# Patient Record
Sex: FEMALE | Race: WHITE | NOT HISPANIC OR LATINO | Employment: FULL TIME | URBAN - METROPOLITAN AREA
[De-identification: names, ages, dates, MRNs, and addresses within clinical notes are randomized per-mention and may not be internally consistent; named-entity substitution may affect disease eponyms.]

---

## 2017-04-19 ENCOUNTER — OFFICE VISIT (OUTPATIENT)
Dept: FAMILY MEDICINE | Facility: CLINIC | Age: 47
End: 2017-04-19
Payer: COMMERCIAL

## 2017-04-19 VITALS
HEIGHT: 66 IN | HEART RATE: 88 BPM | DIASTOLIC BLOOD PRESSURE: 80 MMHG | TEMPERATURE: 97.8 F | SYSTOLIC BLOOD PRESSURE: 120 MMHG | BODY MASS INDEX: 35.68 KG/M2 | WEIGHT: 222 LBS

## 2017-04-19 DIAGNOSIS — H69.93 DYSFUNCTION OF EUSTACHIAN TUBE, BILATERAL: Primary | ICD-10-CM

## 2017-04-19 DIAGNOSIS — H91.93 DECREASED HEARING OF BOTH EARS: ICD-10-CM

## 2017-04-19 PROCEDURE — 99213 OFFICE O/P EST LOW 20 MIN: CPT | Performed by: FAMILY MEDICINE

## 2017-04-19 RX ORDER — FLUTICASONE PROPIONATE 50 MCG
1 SPRAY, SUSPENSION (ML) NASAL 2 TIMES DAILY PRN
Qty: 16 G | Refills: 1 | Status: SHIPPED | OUTPATIENT
Start: 2017-04-19 | End: 2022-11-16

## 2017-04-19 NOTE — PATIENT INSTRUCTIONS
Common Middle Ear Problems  Your middle ear may have been injured or infected recently. Over time, certain growths or bone disease can also harm the middle ear. Left untreated, middle ear problems often lead to lifelong hearing loss. There are two types of hearing loss: conductive and sensorineural. One or both kinds can occur. Injury, infection, certain growths, or bone disease can cause your symptoms. A ruptured eardrum or a long-lasting (chronic) ear infection may be painful and decrease hearing.  Symptoms    Hearing loss in one or both ears    Fluid, often smelly, draining from the ear    Pain, pressure, or discomfort in the ear    Ringing in the ear   Conductive and sensorineural hearing loss    Sound waves may be disrupted before they reach the inner ear. If this happens, conductive hearing loss may occur. The ear canal can be blocked by wax, infection, a tumor, or a foreign object. The eardrum can be injured or infected. Abnormal bone growth, infection, or tumors in the middle ear can block sound waves.  Sound waves may not be processed correctly in the inner ear. If this happens, sensorineural hearing loss may occur.    0567-5790 [employer]. [address] [place] [address]. All rights reserved. This information is not intended as a substitute for professional medical care. Always follow your healthcare professional's instructions.

## 2017-04-19 NOTE — PROGRESS NOTES
SUBJECTIVE:                                                    Nancy Bronson is a 47 year old female who presents to clinic today for the following health issues:    Notes plugging of the left ear since onset of sinus infection symptoms.     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     Interested in pursuing hearing aid consultation.       Problem list and histories reviewed & adjusted, as indicated.  Additional history: none    Patient Active Problem List   Diagnosis     Snoring     S/P hysterectomy     Anemia     Past Surgical History:   Procedure Laterality Date     ABDOMEN SURGERY      hysterectomy     C C-SEC ONLY,PREV C-SEC  ,    2     C NONSPECIFIC PROCEDURE      tonsils     C NONSPECIFIC PROCEDURE      jaw surgery     DAVINCI HYSTERECTOMY SUPRACERVICAL  2012    benign pathProcedure: DAVINCI HYSTERECTOMY SUPRACERVICAL;  Davinci Supracervical Hysterectomy with Bilateral Salpingectomy, Cystoscopy ;  Surgeon: Selene Cardenas DO;  Location: [facility] OR     ENT SURGERY       HEAD & NECK SURGERY      jaw surgery     LAPAROSCOPIC TUBAL LIGATION         Social History   Substance Use Topics     Smoking status: Former Smoker     Smokeless tobacco: Never Used      Comment: quit in      Alcohol use Yes      Comment: social     Family History   Problem Relation Age of Onset     Respiratory Father      sleep apnea, hypertension     Family History Negative Mother      Family History Negative Brother      1     CANCER Maternal Grandmother      Hodgkins      CANCER Maternal Grandfather       lung cancer-smoker     CANCER Paternal Grandfather      leukemia     Family History Negative Daughter      Cancer - colorectal Maternal Aunt      -at age of diagnosis -early 40's     CANCER Other      Hodgkins and  faternal twin brother as well      Breast Cancer No family hx of            Reviewed and updated as needed  "this visit by clinical staff       Reviewed and updated as needed this visit by Provider         ROS:  Constitutional, HEENT, cardiovascular, pulmonary, gi and gu systems are negative, except as otherwise noted.    OBJECTIVE:                                                    /80 (BP Location: Right arm, Patient Position: Chair, Cuff Size: Adult Large)  Pulse 88  Temp 97.8  F (36.6  C) (Oral)  Ht 5' 6\" (1.676 m)  Wt 222 lb (100.7 kg)  LMP 12/02/2012  Breastfeeding? No  BMI 35.83 kg/m2  Body mass index is 35.83 kg/(m^2).  GENERAL: healthy, alert and no distress  EYES: Eyes grossly normal to inspection, conjunctivae and sclerae normal  HENT: serous effusion behind both TMs, erosion of posterior EAC on left  NECK: no adenopathy    Diagnostic Test Results:  none      ASSESSMENT/PLAN:                                                      1. Dysfunction of eustachian tube, bilateral - suggested intranasal steroid to help calm EDs.   - fluticasone (FLONASE) 50 MCG/ACT spray; Spray 1 spray into both nostrils 2 times daily as needed for rhinitis or allergies  Dispense: 16 g; Refill: 1    2. Decreased hearing of both ears  - OTOLARYNGOLOGY REFERRAL      Tere Garcia MD  [facility]    "

## 2017-04-19 NOTE — MR AVS SNAPSHOT
After Visit Summary   4/19/2017    Nancy Bronson    MRN: [id number]           Patient Information     Date Of Birth          1970        Visit Information        Provider Department      4/19/2017 2:30 PM Tere Garcia MD [facility]        Today's Diagnoses     Dysfunction of eustachian tube, bilateral    -  1    Decreased hearing of both ears          Care Instructions      Common Middle Ear Problems  Your middle ear may have been injured or infected recently. Over time, certain growths or bone disease can also harm the middle ear. Left untreated, middle ear problems often lead to lifelong hearing loss. There are two types of hearing loss: conductive and sensorineural. One or both kinds can occur. Injury, infection, certain growths, or bone disease can cause your symptoms. A ruptured eardrum or a long-lasting (chronic) ear infection may be painful and decrease hearing.  Symptoms    Hearing loss in one or both ears    Fluid, often smelly, draining from the ear    Pain, pressure, or discomfort in the ear    Ringing in the ear   Conductive and sensorineural hearing loss    Sound waves may be disrupted before they reach the inner ear. If this happens, conductive hearing loss may occur. The ear canal can be blocked by wax, infection, a tumor, or a foreign object. The eardrum can be injured or infected. Abnormal bone growth, infection, or tumors in the middle ear can block sound waves.  Sound waves may not be processed correctly in the inner ear. If this happens, sensorineural hearing loss may occur.    9331-3330 [employer]. [address] [place] [address]. All rights reserved. This information is not intended as a substitute for professional medical care. Always follow your healthcare professional's instructions.              Follow-ups after your visit        Additional Services     OTOLARYNGOLOGY REFERRAL       Audiology consultation followed by ENT  consultation    Your provider has referred you to: N: [facility] [phone]   [email or web address]    Please be aware that coverage of these services is subject to the terms and limitations of your health insurance plan.  Call member services at your health plan with any benefit or coverage questions.      Please bring the following with you to your appointment:    (1) Any X-Rays, CTs or MRIs which have been performed.  Contact the facility where they were done to arrange for  prior to your scheduled appointment.   (2) List of current medications  (3) This referral request   (4) Any documents/labs given to you for this referral                  Your next 10 appointments already scheduled     May 08, 2017  4:15 PM CDT   MA SCREENING DIGITAL BILATERAL with LVMA1   [facility]    [address] [address] [phone]           Do not use any powder, lotion or deodorant under your arms or on your breast. If you do, we will ask you to remove it before your exam.  Wear comfortable, two-piece clothing.  If you have any allergies, tell your care team.  Bring any previous mammograms from other facilities or have them mailed to the breast center.              Who to contact     If you have questions or need follow up information about today's clinic visit or your schedule please contact [facility] directly at [phone].  Normal or non-critical lab and imaging results will be communicated to you by MyChart, letter or phone within 4 business days after the clinic has received the results. If you do not hear from us within 7 days, please contact the clinic through MyChart or phone. If you have a critical or abnormal lab result, we will notify you by phone as soon as possible.  Submit refill requests through [employer] or call your pharmacy and they will  "forward the refill request to us. Please allow 3 business days for your refill to be completed.          Additional Information About Your Visit        [employer] Information     [employer] gives you secure access to your electronic health record. If you see a primary care provider, you can also send messages to your care team and make appointments. If you have questions, please call your primary care clinic.  If you do not have a primary care provider, please call [phone] and they will assist you.        Care EveryWhere ID     This is your Care EveryWhere ID. This could be used by other organizations to access your [facility] medical records  [id number]        Your Vitals Were     Pulse Temperature Height Last Period Breastfeeding? BMI (Body Mass Index)    88 97.8  F (36.6  C) (Oral) 5' 6\" (1.676 m) 12/02/2012 No 35.83 kg/m2       Blood Pressure from Last 3 Encounters:   04/19/17 120/80   08/20/15 130/89   02/16/15 110/78    Weight from Last 3 Encounters:   04/19/17 222 lb (100.7 kg)   08/20/15 219 lb (99.3 kg)   02/16/15 207 lb 1 oz (93.9 kg)              We Performed the Following     OTOLARYNGOLOGY REFERRAL          Today's Medication Changes          These changes are accurate as of: 4/19/17  3:09 PM.  If you have any questions, ask your nurse or doctor.               Start taking these medicines.        Dose/Directions    fluticasone 50 MCG/ACT spray   Commonly known as:  FLONASE   Used for:  Dysfunction of eustachian tube, bilateral   Started by:  Tere Garcia MD        Dose:  1 spray   Spray 1 spray into both nostrils 2 times daily as needed for rhinitis or allergies   Quantity:  16 g   Refills:  1         Stop taking these medicines if you haven't already. Please contact your care team if you have questions.     phentermine 37.5 MG capsule   Stopped by:  Tere Garcia MD                Where to get your medicines      These medications were sent to [facility]/pharmacy #[id number] - [place], [place] - [address]  [address]  [address], [facility] [address]     Phone:  [phone]     fluticasone 50 MCG/ACT spray                Primary Care Provider Office Phone #    [facility] [place] [phone]       No address on file        Thank you!     Thank you for choosing [facility]  for your care. Our goal is always to provide you with excellent care. Hearing back from our patients is one way we can continue to improve our services. Please take a few minutes to complete the written survey that you may receive in the mail after your visit with us. Thank you!             Your Updated Medication List - Protect others around you: Learn how to safely use, store and throw away your medicines at www.disposemymeds.org.          This list is accurate as of: 4/19/17  3:09 PM.  Always use your most recent med list.                   Brand Name Dispense Instructions for use    fluticasone 50 MCG/ACT spray    FLONASE    16 g    Spray 1 spray into both nostrils 2 times daily as needed for rhinitis or allergies

## 2017-04-19 NOTE — NURSING NOTE
"Chief Complaint   Patient presents with     Ear Problem       Initial /80 (BP Location: Right arm, Patient Position: Chair, Cuff Size: Adult Large)  Pulse 88  Temp 97.8  F (36.6  C) (Oral)  Ht 5' 6\" (1.676 m)  Wt 222 lb (100.7 kg)  LMP 12/02/2012  Breastfeeding? No  BMI 35.83 kg/m2 Estimated body mass index is 35.83 kg/(m^2) as calculated from the following:    Height as of this encounter: 5' 6\" (1.676 m).    Weight as of this encounter: 222 lb (100.7 kg).  Medication Reconciliation: complete     Roger Renner CMA        Failed hearing at 25 db missed all.   "

## 2017-05-08 ENCOUNTER — RADIANT APPOINTMENT (OUTPATIENT)
Dept: MAMMOGRAPHY | Facility: CLINIC | Age: 47
End: 2017-05-08
Payer: COMMERCIAL

## 2017-05-08 DIAGNOSIS — Z12.31 VISIT FOR SCREENING MAMMOGRAM: ICD-10-CM

## 2017-05-08 PROCEDURE — G0202 SCR MAMMO BI INCL CAD: HCPCS | Mod: TC

## 2018-06-25 ENCOUNTER — TRANSFERRED RECORDS (OUTPATIENT)
Dept: HEALTH INFORMATION MANAGEMENT | Facility: CLINIC | Age: 48
End: 2018-06-25

## 2018-06-28 ENCOUNTER — OFFICE VISIT (OUTPATIENT)
Dept: URGENT CARE | Facility: URGENT CARE | Age: 48
End: 2018-06-28
Payer: COMMERCIAL

## 2018-06-28 VITALS
SYSTOLIC BLOOD PRESSURE: 124 MMHG | TEMPERATURE: 98.3 F | BODY MASS INDEX: 35.83 KG/M2 | WEIGHT: 222 LBS | OXYGEN SATURATION: 98 % | DIASTOLIC BLOOD PRESSURE: 78 MMHG | HEART RATE: 80 BPM

## 2018-06-28 DIAGNOSIS — H61.23 BILATERAL IMPACTED CERUMEN: Primary | ICD-10-CM

## 2018-06-28 DIAGNOSIS — H66.90 ACUTE OTITIS MEDIA, UNSPECIFIED OTITIS MEDIA TYPE: ICD-10-CM

## 2018-06-28 DIAGNOSIS — H69.93 DYSFUNCTION OF EUSTACHIAN TUBE, BILATERAL: ICD-10-CM

## 2018-06-28 PROCEDURE — 99213 OFFICE O/P EST LOW 20 MIN: CPT | Performed by: FAMILY MEDICINE

## 2018-06-28 RX ORDER — AZITHROMYCIN 250 MG/1
TABLET, FILM COATED ORAL
Qty: 6 TABLET | Refills: 0 | Status: SHIPPED | OUTPATIENT
Start: 2018-06-28 | End: 2018-08-10

## 2018-06-28 NOTE — PROGRESS NOTES
SUBJECTIVE:   Nancy Bronson is a 48 year old female presenting with a chief complaint of   Chief Complaint   Patient presents with     Urgent Care     Otalgia     Started last week[facility]        She is { New/Established:289128} patient of [facility].    { Conditions:365497}    Review of Systems    Past Medical History:   Diagnosis Date     Anemia      NO ACTIVE PROBLEMS      Family History   Problem Relation Age of Onset     Respiratory Father      sleep apnea, hypertension     Family History Negative Mother      Family History Negative Brother      1     Cancer Maternal Grandmother      Hodgkins      Cancer Maternal Grandfather       lung cancer-smoker     Cancer Paternal Grandfather      leukemia     Family History Negative Daughter      Cancer - colorectal Maternal Aunt      -at age of diagnosis -early 40's     Cancer Other      Hodgkins and  faternal twin brother as well      Breast Cancer No family hx of      Current Outpatient Prescriptions   Medication Sig Dispense Refill     azithromycin (ZITHROMAX) 250 MG tablet Two tablets first day, then one tablet daily for four days. 6 tablet 0     fluticasone (FLONASE) 50 MCG/ACT spray Spray 1 spray into both nostrils 2 times daily as needed for rhinitis or allergies 16 g 1     Social History   Substance Use Topics     Smoking status: Former Smoker     Smokeless tobacco: Never Used      Comment: quit in      Alcohol use Yes      Comment: social       OBJECTIVE  /78  Pulse 80  Temp 98.3  F (36.8  C) (Oral)  Wt 222 lb (100.7 kg)  LMP 2012  SpO2 98%  BMI 35.83 kg/m2    Physical Exam    Labs:  No results found for this or any previous visit (from the past 24 hour(s)).    {XRay was/was not done (Optional):431348}    ASSESSMENT:      ICD-10-CM    1. Bilateral impacted cerumen H61.23    2. Dysfunction of Eustachian tube, bilateral H69.83    3. Acute otitis media, unspecified otitis media type  "H66.90 azithromycin (ZITHROMAX) 250 MG tablet        Medical Decision Making:    Differential Diagnosis:  {UC Differential Choices:164116}    Serious Comorbid Conditions:  {UC Serious Comorbid Conditions:362744}    PLAN:    {UC Plan Choices:220039}    Followup:    {UC Followup:837987::\"If not improving or if condition worsens, follow up with your Primary Care Provider\"}    There are no Patient Instructions on file for this visit.      "

## 2018-06-28 NOTE — MR AVS SNAPSHOT
After Visit Summary   6/28/2018    Nancy Bronson    MRN: [id number]           Patient Information     Date Of Birth          1970        Visit Information        Provider Department      6/28/2018 6:45 PM Thanh Mckinney MD [facility]        Today's Diagnoses     Bilateral impacted cerumen    -  1    Dysfunction of Eustachian tube, bilateral        Acute otitis media, unspecified otitis media type           Follow-ups after your visit        Who to contact     If you have questions or need follow up information about today's clinic visit or your schedule please contact [facility] directly at [phone].  Normal or non-critical lab and imaging results will be communicated to you by MyChart, letter or phone within 4 business days after the clinic has received the results. If you do not hear from us within 7 days, please contact the clinic through [employer] or phone. If you have a critical or abnormal lab result, we will notify you by phone as soon as possible.  Submit refill requests through [employer] or call your pharmacy and they will forward the refill request to us. Please allow 3 business days for your refill to be completed.          Additional Information About Your Visit        MyChart Information     [employer] gives you secure access to your electronic health record. If you see a primary care provider, you can also send messages to your care team and make appointments. If you have questions, please call your primary care clinic.  If you do not have a primary care provider, please call [phone] and they will assist you.        Care EveryWhere ID     This is your Care EveryWhere ID. This could be used by other organizations to access your [facility] medical records  [id number]        Your Vitals Were     Pulse Temperature Last Period Pulse Oximetry BMI (Body Mass Index)       80 98.3  F (36.8  C) (Oral) 12/02/2012 98% 35.83 kg/m2        Blood Pressure  from Last 3 Encounters:   06/28/18 124/78   04/19/17 120/80   08/20/15 130/89    Weight from Last 3 Encounters:   06/28/18 222 lb (100.7 kg)   04/19/17 222 lb (100.7 kg)   08/20/15 219 lb (99.3 kg)              Today, you had the following     No orders found for display         Today's Medication Changes          These changes are accurate as of 6/28/18  9:02 PM.  If you have any questions, ask your nurse or doctor.               Start taking these medicines.        Dose/Directions    azithromycin 250 MG tablet   Commonly known as:  ZITHROMAX   Used for:  Acute otitis media, unspecified otitis media type   Started by:  Thanh Mckinney MD        Two tablets first day, then one tablet daily for four days.   Quantity:  6 tablet   Refills:  0            Where to get your medicines      These medications were sent to [facility]/pharmacy #[id number] - [place], [place] - [address]  [facility]  [address]  [facility], [facility] [address]     Phone:  [phone]     azithromycin 250 MG tablet                Primary Care Provider Office Phone # Fax #    [facility] [phone] [address] [facility] [address]        Equal Access to Services     DENNIS PERRY AH: Hadii nasim cochran hadasho Soomaali, waaxda luqadaha, qaybta kaalmada adeegyada, leesa chow. So [facility] [phone].    ATENCIÓN: Si habla español, tiene a escobar disposición servicios gratuitos de asistencia lingüística. Llame al [phone].    We comply with applicable federal civil rights laws and [place] laws. We do not discriminate on the basis of race, color, national origin, age, disability, sex, sexual orientation, or gender identity.            Thank you!     Thank you for choosing [facility]  for your care. Our goal is always to provide you with excellent care. Hearing back from our patients is one way we can continue to improve our services. Please take a few minutes to complete the written survey  that you may receive in the mail after your visit with us. Thank you!             Your Updated Medication List - Protect others around you: Learn how to safely use, store and throw away your medicines at www.disposemymeds.org.          This list is accurate as of 6/28/18  9:02 PM.  Always use your most recent med list.                   Brand Name Dispense Instructions for use Diagnosis    azithromycin 250 MG tablet    ZITHROMAX    6 tablet    Two tablets first day, then one tablet daily for four days.    Acute otitis media, unspecified otitis media type       fluticasone 50 MCG/ACT spray    FLONASE    16 g    Spray 1 spray into both nostrils 2 times daily as needed for rhinitis or allergies    Dysfunction of Eustachian tube, bilateral

## 2018-06-29 NOTE — PROGRESS NOTES
SUBJECTIVE:  Nancy Bronson is a 48 year old female 3 2011 deeply anywho is complaining of ear pain for the last several days.  She states that she has had a long history of ear infections    [facility] and [facility] had cerumen impaction and told her to use some over-the-counter Colace.  She will try to remove it at home and is unable to  OBJECTIVE:  /78  Pulse 80  Temp 98.3  F (36.8  C) (Oral)  Wt 222 lb (100.7 kg)  LMP 12/02/2012  SpO2 98%  BMI 35.83 kg/m2  General appearance: mild distress.    Ears: abnormal: R TM cerumen impaction after the cerumen was removed and the ear is red; L TM erythematous  Nose: clear rhinorrhea  Oropharynx: mild erythema  Neck: supple and few small anterior cervical nodes  Lungs: chest clear to IPPA and clear to IPPA    ASSESSMENT:  Otitis Media    PLAN:  1) Antibiotics per Epic[facility] orders.  2) Symptomatic therapy suggested: use acetaminophen, ibuprofen prn.   3) Call or return to clinic prn if these symptoms worsen or fail to improve as anticipated.

## 2018-08-10 ENCOUNTER — OFFICE VISIT (OUTPATIENT)
Dept: URGENT CARE | Facility: URGENT CARE | Age: 48
End: 2018-08-10
Payer: COMMERCIAL

## 2018-08-10 VITALS
SYSTOLIC BLOOD PRESSURE: 120 MMHG | DIASTOLIC BLOOD PRESSURE: 80 MMHG | TEMPERATURE: 98.9 F | OXYGEN SATURATION: 97 % | HEART RATE: 91 BPM

## 2018-08-10 DIAGNOSIS — H72.92 PERFORATION OF TYMPANIC MEMBRANE, LEFT: Primary | ICD-10-CM

## 2018-08-10 PROCEDURE — 99213 OFFICE O/P EST LOW 20 MIN: CPT | Performed by: FAMILY MEDICINE

## 2018-08-10 NOTE — MR AVS SNAPSHOT
After Visit Summary   8/10/2018    Nancy Bronson    MRN: [id number]           Patient Information     Date Of Birth          1970        Visit Information        Provider Department      8/10/2018 5:40 PM Andrew Marquis MD [facility]        Today's Diagnoses     Perforation of tympanic membrane, left    -  1      Care Instructions    Avoid inserting objects or using drops in the left ear    Follow-up with your regular doctor or ENT in 4-6 weeks to re-evaluate    Ibuprofen 400mg as needed for pain          Follow-ups after your visit        Who to contact     If you have questions or need follow up information about today's clinic visit or your schedule please contact [facility] directly at [phone].  Normal or non-critical lab and imaging results will be communicated to you by [employer], letter or phone within 4 business days after the clinic has received the results. If you do not hear from us within 7 days, please contact the clinic through [employer] or phone. If you have a critical or abnormal lab result, we will notify you by phone as soon as possible.  Submit refill requests through [employer] or call your pharmacy and they will forward the refill request to us. Please allow 3 business days for your refill to be completed.          Additional Information About Your Visit        MyChart Information     [employer] gives you secure access to your electronic health record. If you see a primary care provider, you can also send messages to your care team and make appointments. If you have questions, please call your primary care clinic.  If you do not have a primary care provider, please call [phone] and they will assist you.        Care EveryWhere ID     This is your Care EveryWhere ID. This could be used by other organizations to access your [facility] medical records  [id number]        Your Vitals Were     Pulse Temperature Last Period Pulse  Oximetry          91 98.9  F (37.2  C) (Oral) 12/02/2012 97%         Blood Pressure from Last 3 Encounters:   08/10/18 120/80   06/28/18 124/78   04/19/17 120/80    Weight from Last 3 Encounters:   06/28/18 222 lb (100.7 kg)   04/19/17 222 lb (100.7 kg)   08/20/15 219 lb (99.3 kg)              Today, you had the following     No orders found for display         Today's Medication Changes          These changes are accurate as of 8/10/18  6:39 PM.  If you have any questions, ask your nurse or doctor.               Stop taking these medicines if you haven't already. Please contact your care team if you have questions.     azithromycin 250 MG tablet   Commonly known as:  ZITHROMAX                    Primary Care Provider Office Phone # Fax #    [facility] [phone] [address] [facility] [address]        Equal Access to Services     DENNIS PERRY : Hadii nasim cochran hadasho Soomaali, waaxda luqadaha, qaybta kaalmada adeegyada, leesa bolanos . So [facility] [phone].    ATENCIÓN: Si habla español, tiene a escobar disposición servicios gratuitos de asistencia lingüística. Llame al [phone].    We comply with applicable federal civil rights laws and [place] laws. We do not discriminate on the basis of race, color, national origin, age, disability, sex, sexual orientation, or gender identity.            Thank you!     Thank you for choosing [facility]  for your care. Our goal is always to provide you with excellent care. Hearing back from our patients is one way we can continue to improve our services. Please take a few minutes to complete the written survey that you may receive in the mail after your visit with us. Thank you!             Your Updated Medication List - Protect others around you: Learn how to safely use, store and throw away your medicines at www.disposemymeds.org.          This list is accurate as of 8/10/18  6:39 PM.  Always  use your most recent med list.                   Brand Name Dispense Instructions for use Diagnosis    fluticasone 50 MCG/ACT spray    FLONASE    16 g    Spray 1 spray into both nostrils 2 times daily as needed for rhinitis or allergies    Dysfunction of Eustachian tube, bilateral

## 2018-08-10 NOTE — PROGRESS NOTES
"Subjective:   Nancy Bronson is a 48 year old female who presents for   Chief Complaint   Patient presents with     Urgent Care     Otalgia     Lt Ear pain started yesterday. Hx of wax build up   Pain in the last 24 hours. Came on in the middle of the night.   Took ibuprofen   Used antipyrine-benzocaine drops (leftover from son) in the ear and it did help with symptoms.   No cold/congestion symptoms or illnesses recently.   Diminished hearing on the left side      PMHX/PSHX/MEDS/ALLERGIES/SHX/FHX reviewed in Epic.    Patient Active Problem List    Diagnosis Date Noted     Anemia 10/01/2013     Priority: Medium     S/P hysterectomy 01/22/2013     Priority: Medium     Snoring 02/24/2011     Priority: Medium       Current Outpatient Prescriptions   Medication     fluticasone (FLONASE) 50 MCG/ACT spray     No current facility-administered medications for this visit.        ROS:  As above per HPI    Objective:   /80 (BP Location: Right arm, Patient Position: Chair, Cuff Size: Adult Large)  Pulse 91  Temp 98.9  F (37.2  C) (Oral)  LMP 12/02/2012  SpO2 97%, There is no height or weight on file to calculate BMI.  Gen:  NAD, well-nourished, sitting in chair comfortably  HEENT: EOMI, PERRL sclera anicteric, Head normocephalic, ; nares patent; oropharynx pink and moist, perforation of left TM on the posterior section, no erythema or bulging of TM\"s bilaterally. Normal ear canals  Neck: trachea midline  CV: Hemodynamically stable, cap refill < 2 seconds peripherally         Results for orders placed or performed in visit on 05/08/17   *MA Screening Digital Bilateral    Narrative    Examination: Bilateral digital screening mammography with computer  aided detection, 5/8/2017 4:15 PM.    Comparison: 7/28/2014    History: No current breast concerns.    BREAST DENSITY: Scattered fibroglandular densities.    COMMENTS:  No suspicious finding.      Impression    IMPRESSION: BI-RADS CATEGORY: 1 -  NEGATIVE.    RECOMMENDED " FOLLOW-UP: Annual Mammography.      The patient will be notified of the results.     ANGEL SANTIAGO       Assessment & Plan:   Nancy DIPAK Bronson, 48 year old female who presents with:  Perforation of tympanic membrane, left  No recent cold or congestion. No trauma that caused this. No signs of infection. REcommended not using ear drops or getting ear wet. Re-evaluation in 4-6 weeks. Encouraged that because of her previous evaluations and diminished hearing that she be evaluated at ENT clinic/Audiology for her symptoms      Andrew Marquis MD   [facility]    Options for treatment and/or follow-up care were reviewed with the patient. Nancy Bronson and/or legal guardian was engaged and actively involved in the decision making process. Patient/guardian verbalized understanding of the options discussed and was satisfied with the final plan.

## 2018-08-10 NOTE — PATIENT INSTRUCTIONS
Avoid inserting objects or using drops in the left ear    Follow-up with your regular doctor or ENT in 4-6 weeks to re-evaluate    Ibuprofen 400mg as needed for pain

## 2018-09-25 DIAGNOSIS — H69.93 DYSFUNCTION OF BOTH EUSTACHIAN TUBES: Primary | ICD-10-CM

## 2018-12-05 ENCOUNTER — TRANSFERRED RECORDS (OUTPATIENT)
Dept: HEALTH INFORMATION MANAGEMENT | Facility: CLINIC | Age: 48
End: 2018-12-05

## 2019-04-01 ENCOUNTER — OFFICE VISIT (OUTPATIENT)
Dept: OBGYN | Facility: CLINIC | Age: 49
End: 2019-04-01
Payer: COMMERCIAL

## 2019-04-01 VITALS
BODY MASS INDEX: 35.27 KG/M2 | WEIGHT: 219.5 LBS | HEIGHT: 66 IN | DIASTOLIC BLOOD PRESSURE: 78 MMHG | SYSTOLIC BLOOD PRESSURE: 116 MMHG

## 2019-04-01 DIAGNOSIS — M79.671 RIGHT FOOT PAIN: ICD-10-CM

## 2019-04-01 DIAGNOSIS — M79.672 LEFT FOOT PAIN: ICD-10-CM

## 2019-04-01 DIAGNOSIS — Z86.2 HISTORY OF ANEMIA: ICD-10-CM

## 2019-04-01 DIAGNOSIS — Z00.00 ROUTINE GENERAL MEDICAL EXAMINATION AT A HEALTH CARE FACILITY: Primary | ICD-10-CM

## 2019-04-01 DIAGNOSIS — L72.3 SEBACEOUS CYST: ICD-10-CM

## 2019-04-01 DIAGNOSIS — L21.9 SEBORRHEIC DERMATITIS: ICD-10-CM

## 2019-04-01 LAB
ALBUMIN SERPL-MCNC: 3.4 G/DL (ref 3.4–5)
ALP SERPL-CCNC: 99 U/L (ref 40–150)
ALT SERPL W P-5'-P-CCNC: 19 U/L (ref 0–50)
ANION GAP SERPL CALCULATED.3IONS-SCNC: 7 MMOL/L (ref 3–14)
AST SERPL W P-5'-P-CCNC: 12 U/L (ref 0–45)
BILIRUB SERPL-MCNC: 0.4 MG/DL (ref 0.2–1.3)
BUN SERPL-MCNC: 7 MG/DL (ref 7–30)
CALCIUM SERPL-MCNC: 9 MG/DL (ref 8.5–10.1)
CHLORIDE SERPL-SCNC: 104 MMOL/L (ref 94–109)
CHOLEST SERPL-MCNC: 145 MG/DL
CO2 SERPL-SCNC: 28 MMOL/L (ref 20–32)
CREAT SERPL-MCNC: 0.88 MG/DL (ref 0.52–1.04)
ERYTHROCYTE [DISTWIDTH] IN BLOOD BY AUTOMATED COUNT: 16.4 % (ref 10–15)
FERRITIN SERPL-MCNC: 57 NG/ML (ref 8–252)
GFR SERPL CREATININE-BSD FRML MDRD: 78 ML/MIN/{1.73_M2}
GLUCOSE SERPL-MCNC: 77 MG/DL (ref 70–99)
HCT VFR BLD AUTO: 43.3 % (ref 35–47)
HDLC SERPL-MCNC: 40 MG/DL
HGB BLD-MCNC: 14.3 G/DL (ref 11.7–15.7)
IRON SATN MFR SERPL: 19 % (ref 15–46)
IRON SERPL-MCNC: 61 UG/DL (ref 35–180)
LDLC SERPL CALC-MCNC: 81 MG/DL
MCH RBC QN AUTO: 29.2 PG (ref 26.5–33)
MCHC RBC AUTO-ENTMCNC: 33 G/DL (ref 31.5–36.5)
MCV RBC AUTO: 88 FL (ref 78–100)
NONHDLC SERPL-MCNC: 105 MG/DL
PLATELET # BLD AUTO: 333 10E9/L (ref 150–450)
POTASSIUM SERPL-SCNC: 3.7 MMOL/L (ref 3.4–5.3)
PROT SERPL-MCNC: 7.4 G/DL (ref 6.8–8.8)
RBC # BLD AUTO: 4.9 10E12/L (ref 3.8–5.2)
SODIUM SERPL-SCNC: 139 MMOL/L (ref 133–144)
TIBC SERPL-MCNC: 325 UG/DL (ref 240–430)
TRIGL SERPL-MCNC: 118 MG/DL
TSH SERPL DL<=0.005 MIU/L-ACNC: 2.71 MU/L (ref 0.4–4)
WBC # BLD AUTO: 11.1 10E9/L (ref 4–11)

## 2019-04-01 PROCEDURE — 82728 ASSAY OF FERRITIN: CPT | Performed by: FAMILY MEDICINE

## 2019-04-01 PROCEDURE — 83550 IRON BINDING TEST: CPT | Performed by: FAMILY MEDICINE

## 2019-04-01 PROCEDURE — 36415 COLL VENOUS BLD VENIPUNCTURE: CPT | Performed by: FAMILY MEDICINE

## 2019-04-01 PROCEDURE — 80053 COMPREHEN METABOLIC PANEL: CPT | Performed by: FAMILY MEDICINE

## 2019-04-01 PROCEDURE — 99396 PREV VISIT EST AGE 40-64: CPT | Performed by: FAMILY MEDICINE

## 2019-04-01 PROCEDURE — 83540 ASSAY OF IRON: CPT | Performed by: FAMILY MEDICINE

## 2019-04-01 PROCEDURE — 80061 LIPID PANEL: CPT | Performed by: FAMILY MEDICINE

## 2019-04-01 PROCEDURE — 85027 COMPLETE CBC AUTOMATED: CPT | Performed by: FAMILY MEDICINE

## 2019-04-01 PROCEDURE — 84443 ASSAY THYROID STIM HORMONE: CPT | Performed by: FAMILY MEDICINE

## 2019-04-01 PROCEDURE — G0145 SCR C/V CYTO,THINLAYER,RESCR: HCPCS | Performed by: FAMILY MEDICINE

## 2019-04-01 PROCEDURE — 87624 HPV HI-RISK TYP POOLED RSLT: CPT | Performed by: FAMILY MEDICINE

## 2019-04-01 ASSESSMENT — MIFFLIN-ST. JEOR: SCORE: 1642.4

## 2019-04-01 NOTE — NURSING NOTE
"Chief Complaint   Patient presents with     Gyn Exam     pap due--pt is fasting for labs--has bump on back and wants it looked at--needs referral for sleep study--has pain in her feet       Initial /78 (BP Location: Right arm, Patient Position: Sitting, Cuff Size: Adult Large)   Ht 1.676 m (5' 6\")   Wt 99.6 kg (219 lb 8 oz)   LMP 2012   BMI 35.43 kg/m   Estimated body mass index is 35.43 kg/m  as calculated from the following:    Height as of this encounter: 1.676 m (5' 6\").    Weight as of this encounter: 99.6 kg (219 lb 8 oz).  BP completed using cuff size: large    Questioned patient about current smoking habits.  Pt. quit smoking some time ago.          The following HM Due: mammogram  pap smear        "

## 2019-04-01 NOTE — PROGRESS NOTES
SUBJECTIVE:  Nancy Bronson is an 48 year old  premenopausal woman   who presents for annual gyn exam. Hysterectomy at age 41 -- still has ovaries. No   bleeding, spotting, or discharge noted.     Contraception: Hysterectomy  KLARISSA exposure: no  History of abnormal Pap smear: No  Family history of uterine or ovarian cancer: No  Regular self breast exam: Yes  History of abnormal mammogram: No  Family history of breast cancer: No  History of abnormal lipids: No        - Pt reports a skin lesion on her mid back, would like to know what this is & what needs to be done. She has also begun experiencing bilateral foot pain [on the underside, along the arch) x 2 years ago, noticed it worsened this year [place] & is exacerbated with walking. She did purchase Dr. Vergara's inserts for her shoes about 1 year ago, states this helped mildly but not to where she is comfortable doing activities that involve a lot of walking.         Past Medical History:   Diagnosis Date     Anemia      NO ACTIVE PROBLEMS           Family History   Problem Relation Age of Onset     Respiratory Father         sleep apnea, hypertension     Family History Negative Mother      Family History Negative Brother         1     Cancer Maternal Grandmother         Hodgkins      Cancer Maternal Grandfather          lung cancer-smoker     Cancer Paternal Grandfather         leukemia     Family History Negative Daughter      Cancer - colorectal Maternal Aunt         -at age of diagnosis -early 40's     Cancer Other         Hodgkins and  faternal twin brother as well      Breast Cancer No family hx of        Past Surgical History:   Procedure Laterality Date     ABDOMEN SURGERY  2012    hysterectomy     C C-SEC ONLY,PREV C-SEC  ,    2     C NONSPECIFIC PROCEDURE  1975    tonsils     C NONSPECIFIC PROCEDURE  1985    jaw surgery     DAVINCI HYSTERECTOMY SUPRACERVICAL  2012    benign  "pathProcedure: DAVINCI HYSTERECTOMY SUPRACERVICAL;  Davinci Supracervical Hysterectomy with Bilateral Salpingectomy, Cystoscopy ;  Surgeon: Selene Cardenas DO;  Location: [facility] OR     ENT SURGERY  1975     HEAD & NECK SURGERY  1986    jaw surgery     LAPAROSCOPIC TUBAL LIGATION  2007       Current Outpatient Medications   Medication     fluticasone (FLONASE) 50 MCG/ACT spray     No current facility-administered medications for this visit.      Allergies   Allergen Reactions     No Known Drug Allergies        Social History     Tobacco Use     Smoking status: Former Smoker     Smokeless tobacco: Never Used     Tobacco comment: quit in 1995   Substance Use Topics     Alcohol use: Yes     Comment: social       Review Of Systems  Ears/Nose/Throat: negative  Respiratory: No shortness of breath, dyspnea on exertion, cough, or hemoptysis  Cardiovascular: negative  Gastrointestinal: negative  Genitourinary: negative  Constitutional, HEENT, cardiovascular, pulmonary, GI, , musculoskeletal, neuro, skin, endocrine and psych systems are negative, except as otherwise noted.        This document serves as a record of the services and decisions personally performed and made by Selene Cardenas DO. It was created on her behalf by Breanna Anne, a trained medical scribe. The creation of this document is based the provider's statements to the medical scribe.  Scribe Breanna Anne 9:14 AM, April 1, 2019    OBJECTIVE:  /78 (BP Location: Right arm, Patient Position: Sitting, Cuff Size: Adult Large)   Ht 1.676 m (5' 6\")   Wt 99.6 kg (219 lb 8 oz)   LMP 12/02/2012   BMI 35.43 kg/m    General appearance: healthy, alert and no distress  Skin: Skin color, texture, turgor normal. No rashes or lesions.  Ears: negative  Nose/Sinuses: Nares normal. Septum midline. Mucosa normal. No drainage or sinus tenderness.  Oropharynx: Lips, mucosa, and tongue normal. Teeth and gums normal.  Neck: Neck supple. No adenopathy. Thyroid symmetric, " normal size,, Carotids without bruits.  Lungs: negative, Percussion normal. Good diaphragmatic excursion. Lungs clear  Heart: negative, PMI normal. No lifts, heaves, or thrills. RRR. No murmurs, clicks gallops or rub  Breasts: Inspection negative. No nipple discharge or bleeding. No masses.  Abdomen: Abdomen soft, non-tender. BS normal. No masses, organomegaly  Pelvic: Pelvic examination with pap/without Gonorrhea and Chlamydia   including  External genitalia normal   and vagina normal rugatted mildly atrophic  Examination of urethra  normal no masses, tenderness, scarring  bladder, no masses or tenderness  Cervix no lesions or discharge  Bimanual exam with no tenderness, no descent   Adnexa/parametria normal  Uterus surgically absent            ASSESSMENT:  Nancy Bronson is an 48 year old  postmenopausal woman   who presents for annual gyn exam.     PLAN:  Dx: Annual gyn physical; Sebaceous cyst; Bilateral foot pain  1)  Labs pending; Pap smear - pathology pending; Mammogram ordered  2)  Sebaceous cyst: Dermatology referral given  3)  Bilateral foot pain: Podiatry referral given   - Advised to try plantar fascitis specific inserts in the mean time  4)  Return to clinic in 1 year for annual gyn physical; otherwise as needed.       Rx:  None      PE:  Reviewed health maintenance including diet, regular exercise,   estrogen replacement and periodic exams.          The information in this document, created by the medical scribe for me, accurately reflects the services I personally performed and the decisions made by me. I have reviewed and approved this document for accuracy prior to leaving the patient care area.  9:14 AM, 19    Dr. Selene Cardenas, DO    Obstetrics and Gynecology  [facility]

## 2019-04-01 NOTE — PATIENT INSTRUCTIONS
Plantar fasciitis inserts   [employer]     Labs today     Schedule mammogram     Dr. Selene Cardenas, DO    Obstetrics and Gynecology  [facility] [place]

## 2019-04-03 LAB
COPATH REPORT: NORMAL
PAP: NORMAL

## 2019-04-04 LAB
FINAL DIAGNOSIS: NORMAL
HPV HR 12 DNA CVX QL NAA+PROBE: NEGATIVE
HPV16 DNA SPEC QL NAA+PROBE: NEGATIVE
HPV18 DNA SPEC QL NAA+PROBE: NEGATIVE
SPECIMEN DESCRIPTION: NORMAL
SPECIMEN SOURCE CVX/VAG CYTO: NORMAL

## 2019-04-08 ENCOUNTER — ANCILLARY PROCEDURE (OUTPATIENT)
Dept: MAMMOGRAPHY | Facility: CLINIC | Age: 49
End: 2019-04-08
Attending: FAMILY MEDICINE
Payer: COMMERCIAL

## 2019-04-08 DIAGNOSIS — Z00.00 ROUTINE GENERAL MEDICAL EXAMINATION AT A HEALTH CARE FACILITY: ICD-10-CM

## 2019-04-08 PROCEDURE — 77067 SCR MAMMO BI INCL CAD: CPT

## 2019-05-31 ENCOUNTER — OFFICE VISIT (OUTPATIENT)
Dept: PODIATRY | Facility: CLINIC | Age: 49
End: 2019-05-31
Payer: COMMERCIAL

## 2019-05-31 VITALS
BODY MASS INDEX: 35.27 KG/M2 | HEIGHT: 66 IN | DIASTOLIC BLOOD PRESSURE: 72 MMHG | WEIGHT: 219.5 LBS | SYSTOLIC BLOOD PRESSURE: 118 MMHG

## 2019-05-31 DIAGNOSIS — L30.9 DERMATITIS OF BOTH FEET: Primary | ICD-10-CM

## 2019-05-31 DIAGNOSIS — M72.2 PLANTAR FASCIITIS: ICD-10-CM

## 2019-05-31 DIAGNOSIS — G57.92 NEURITIS OF LEFT HEEL: ICD-10-CM

## 2019-05-31 PROCEDURE — 99203 OFFICE O/P NEW LOW 30 MIN: CPT | Performed by: PODIATRIST

## 2019-05-31 RX ORDER — HYDROCORTISONE VALERATE CREAM 2 MG/G
CREAM TOPICAL 2 TIMES DAILY
Qty: 15 G | Refills: 2 | Status: SHIPPED | OUTPATIENT
Start: 2019-05-31 | End: 2022-11-16

## 2019-05-31 ASSESSMENT — MIFFLIN-ST. JEOR: SCORE: 1637.4

## 2019-05-31 NOTE — PATIENT INSTRUCTIONS
Thank you for choosing [facility]!    DR. BOOKER'S CLINIC LOCATIONS:   MONDAY - [place] TUESDAY - [place]   [address] [facility]  [address]   [place] [address] [place] [address]   [phone]       THURSDAY AM - [place] THURSDAY PM - [place]   [address]   [place] [address] [place] [address]   [phone]       FRIDAY AM - [place] SET UP SURGERY: [phone] [address] APPOINTMENTS: [phone]   [place] [address] BILLING QUESTIONS: [phone] FAX NUMBER: [phone]     Follow Up: 4 wks or as needed    PLANTAR FASCIITIS    Plantar fasciitis is often referred to as heel spurs or heel pain. Plantar fasciitis is a very common problem that affects people of all foot shapes, age, weight and activity level. Pain may be in the arch or on the weight-bearing surface of the heel. The pain may come on without injury or identifiable cause. Pain is generally present when first getting out of bed in the morning or up from a seated break.     CAUSES  The plantar fascia is a dense fibrous band of tissue that stretches across the bottom surface of the foot. The fascia helps support the foot muscles and arch. Plantar fasciitis is thought to be caused by mechanical strain or overload. Frequent walking without shoes or wearing unsupportive shoes is thought to cause structural overload and ultimately inflammation of the plantar fascia. Some people have heel spurs that can be seen on x-ray. The heel spur is actually a minor component of plantar fascitis and is largely ignored.       SELF TREATMENT   The easiest solution is to stop walking around your home without shoes. Plantar fasciitis is largely a shoe problem. Shoes are either not being worn often enough or your current shoes are inadequate for your weight, foot structure or activity level. The majority of shoes on the market  today are not sufficient to resist development of plantar fasciitis or to promote healing. Assume that your current shoes are inadequate and will need to be replaced. Even high quality shoes wear out with 6 months to one year of frequent use. Weight loss is another option. Losing ten pounds in the next two months may be enough to resolve the problem. Ice applied to the area of pain two to three times per day for ten minutes each session can be very helpful. This should continue until the problem resolves. Achilles tendon stretching is essential. Stretch multiple times daily to promote healing and to prevent recurrence in the future.     MEDICAL TREATMENT  Medical treatments often include custom arch supports, cortisone injections, physical therapy, splints to be worn in bed, prescription medications and surgery. The home treatments listed above will be necessary regardless of these advanced medical treatments. Surgery is rarely needed but is very helpful in selected cases.     PROGNOSIS  Plantar fasciitis can last from one day to a lifetime. Some people get intermittent fascitis that is very short-lived. Others suffer daily for years. Excessive body weight, frequent bare foot walking, long hours on the feet, inadequate shoes, predisposing foot structures and excessive activity such as running are all potential issues that lead to chronic and/or recurring plantar fascitis. Having plantar fasciitis means that you are forever prone to this problem and will require modification of some of the above factors. Most people seek treatment within one to four months. Healing usually requires a similar one to four month time frame. Healing time is relative to the amount of effort spent treating the problem.   Plantar fasciitis is highly recurrent. Risk factors often continue, including return to bare foot walking, inadequate shoes, excessive body weight, excessive activities, etc. Your life style and foot structure may  predispose you to recurrent plantar fasciitis. A daily prevention regimen can be very helpful. Ongoing use of shoe inserts, careful attention to appropriate shoes, daily Achilles stretching, etc. may prevent recurrence. Prompt attention at the earliest warning signs of heel pain can resolve the problem in as short as a few days.     EXERCISES    Stair Exercise: Step on the stairs with the ball of your foot and hold your position for at least 15 seconds, then slowly step down with the heels of your foot. You can do this daily and as often as you want.   Picking the Towel: Sit comfortably and then pick the towel up with your toes. You can use any object other than a towel as long as the material can be soft and you can pick it up with your toes.  Rolling the Bottle: Use a small ball or frozen water bottle and then roll it around with your foot.   Flex the Toes: Sit comfortably and then flex your toes by pointing it towards the floor or towards your body. This will relax and flex your foot and exercise your plantar fascia, the calf, and the Achilles tendon. The inability of the foot to stretch often causes the bunching up of the plantar fascia area leading to the pain.  Calf/Achilles Stretching: Lay on you back and raise one foot, then point your toes towards the floor. See photo below:               Hold each stretch for 10 seconds. Stretch 10 times per set, three sets per day. Morning, afternoon and evening. If your heel pain is very severe in the morning, consider doing the first set of stretches before you get out of bed.    THERAPIES DISCUSSED:  1.  Supportive Shoes: minimizing barefoot ambulation helps to provide cushion, padding and support to the ligament that is inflamed. Socks, flip flops, flats and some slippers are not typically sufficient to provide support. Shoes should be worn even indoors  2.  Insert/Orthotics: ones with an arch support built in to them provide further stress relief for the ligament.  See the information below on recommended inserts.  3. Icing: using a frozen water bottle or orange, and rolling it along the bottom of the arch/heel can help to alleviate discomfort, and can act as a tissue massage to the painful, inflamed ligament.  There is evidence that shows icing at least three times daily can be beneficial  4.  Antiinflammatory (NSAID): Ibuprofen, Aleve, as well as Tylenol can be used to help decrease symptoms and improve pain levels. If you have high blood pressure, heart disease, stomach or kidney problems, use antiinflammatories sparingly. Tylenol should not be used if you have liver problems.   5. Activity Modifications: if there are certain things that you do, whether it's going barefoot or certain shoes/activities, you should try to minimize those activities as much as possible until your symptoms are sufficiently resolved. Certainly, some activities, such as running on the treadmill, are easier to take a break from versus others, such as work or chores at home. If there are certain activities that hurt your heel, and you keep doing those activities that hurt your heel, your heel will keep hurting.  **If these initial therapies are insufficient, we have our tier 2 therapies that can more aggressively work to improve your symptoms and get you back to the activities that you enjoy!    OVER THE COUNTER INSERTS    SuperFeet   Sofsole Fit Spenco   Power Step   Walk-Fit Arch Cradles     Most of these can be found at your local [employer], sporting [employer] stores, or online.  **A good high quality over the counter insert should cost around $40-$50      [employer] [facility] LOCATIONS    [address]  [phone]   [address]  [phone] [place]  [address] [place] [address]  [phone]   [address]  [phone]   [place]  [address]  [phone] [place] [address]  [phone]   [place] [address]  [facility]  [phone]   [place] [facility]  [phone] [place]  [address] [phone]           BODY WEIGHT AND YOUR FEET  The following information is included in the after visit summary for all patients. Body weight can be a sensitive issue to discuss in clinic, but we think the following information is very important. Although we focus on the feet and ankles, we do support the overall health of our patients.     Many things can cause foot and ankle problems. Foot structure, activity level, foot mechanics and injuries are common causes of pain. One very important issue that often goes unmentioned, is body weight. Extra weight can cause increased stress on muscles, ligaments, bones and tendons. Sometimes just a few extra pounds is all it takes to put one over her/his threshold. Without reducing that stress, it can be difficult to alleviate pain. As Foot & Ankle specialists, our job is addressing the lower extremity problem and possible causes. Regarding extra body weight, we encourage patients to discuss diet and weight management plans with their primary care doctors. It is this team approach that gives you the best opportunity for pain relief and getting you back on your feet.      [facility] has a Comprehensive Weight Management Program. This program includes counseling, education, non-surgical and surgical approaches to weight loss. If you are interested in learning more either talk to you primary care provider or call [phone].

## 2019-06-12 ENCOUNTER — TRANSFERRED RECORDS (OUTPATIENT)
Dept: SURGERY | Facility: CLINIC | Age: 49
End: 2019-06-12

## 2019-06-12 ENCOUNTER — TRANSFERRED RECORDS (OUTPATIENT)
Dept: HEALTH INFORMATION MANAGEMENT | Facility: CLINIC | Age: 49
End: 2019-06-12

## 2019-06-24 ENCOUNTER — OFFICE VISIT (OUTPATIENT)
Dept: SURGERY | Facility: CLINIC | Age: 49
End: 2019-06-24
Payer: COMMERCIAL

## 2019-06-24 VITALS
OXYGEN SATURATION: 96 % | HEIGHT: 66 IN | WEIGHT: 219 LBS | SYSTOLIC BLOOD PRESSURE: 110 MMHG | BODY MASS INDEX: 35.2 KG/M2 | DIASTOLIC BLOOD PRESSURE: 76 MMHG | HEART RATE: 92 BPM | RESPIRATION RATE: 16 BRPM

## 2019-06-24 DIAGNOSIS — Z53.9 ERRONEOUS ENCOUNTER--DISREGARD: Primary | ICD-10-CM

## 2019-06-24 ASSESSMENT — MIFFLIN-ST. JEOR: SCORE: 1635.13

## 2019-06-24 NOTE — PROGRESS NOTES
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  This is more appropriately managed by the melanoma clinic at the [place].  The patient was not formally seen and examined today.  No charge visit.    Daniel Gao MD  [facility]

## 2019-06-24 NOTE — TELEPHONE ENCOUNTER
ONCOLOGY INTAKE: Records Information      APPT INFORMATION:  Referring provider: Dr. Miller  Referring provider s clinic:  [facility] in [place]  Reason for visit/diagnosis:  Melanoma  Has patient been notified of appointment date and time?: Yes    RECORDS INFORMATION:  Were the records received with the referral (via [employer])? No    Has patient been seen for any external appt for this diagnosis? Yes    If yes, where? [facility] in [place] [facility]    Has patient had any imaging or procedures outside of [facility] for this condition? Yes      If Yes, where? [facility] in [place]    ADDITIONAL INFORMATION:  Put on high priority wait list-should be seen this week.

## 2019-06-24 NOTE — LETTER
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  This is more appropriately managed by the melanoma clinic at the [facility].  The patient was not formally seen and examined today.  No charge visit.     Daniel Gao MD  [facility]

## 2019-06-27 NOTE — TELEPHONE ENCOUNTER
RECORDS STATUS - ALL OTHER DIAGNOSIS      RECORDS RECEIVED FROM: [facility]   DATE RECEIVED: Pending   NOTES STATUS DETAILS   OFFICE NOTE from referring provider     OFFICE NOTE from medical oncologist     DISCHARGE SUMMARY from hospital na    DISCHARGE REPORT from the ER na    OPERATIVE REPORT na    MEDICATION LIST na    CLINICAL TRIAL TREATMENTS TO DATE na    LABS pending    PATHOLOGY REPORTS pending    ANYTHING RELATED TO DIAGNOSIS na    GENONOMIC TESTING na    TYPE:     IMAGING (NEED IMAGES & REPORT) na    CT SCANS     MRI     MAMMO     ULTRASOUND     PET       Action Eve   Action Taken Request sent to [facility] for [facility] and MR. cdk

## 2019-07-01 PROBLEM — C43.9 MELANOMA OF SKIN (H): Status: ACTIVE | Noted: 2019-07-01

## 2019-07-01 PROBLEM — C43.59 MALIGNANT MELANOMA OF TORSO EXCLUDING BREAST (H): Status: ACTIVE | Noted: 2019-07-01

## 2019-07-01 ASSESSMENT — ENCOUNTER SYMPTOMS
HEARTBURN: 0
ABDOMINAL PAIN: 0
RECTAL PAIN: 0
POOR WOUND HEALING: 0
HYPOTENSION: 0
BLOATING: 0
LIGHT-HEADEDNESS: 0
CONSTIPATION: 0
DIARRHEA: 0
EXERCISE INTOLERANCE: 0
ORTHOPNEA: 0
PALPITATIONS: 0
NAUSEA: 0
LEG PAIN: 0
HYPERTENSION: 0
NAIL CHANGES: 0
BOWEL INCONTINENCE: 0
SYNCOPE: 0
SLEEP DISTURBANCES DUE TO BREATHING: 0
SKIN CHANGES: 1
BLOOD IN STOOL: 0
VOMITING: 0
JAUNDICE: 0

## 2019-07-02 ENCOUNTER — PRE VISIT (OUTPATIENT)
Dept: ONCOLOGY | Facility: CLINIC | Age: 49
End: 2019-07-02

## 2019-07-02 ENCOUNTER — ONCOLOGY VISIT (OUTPATIENT)
Dept: ONCOLOGY | Facility: CLINIC | Age: 49
End: 2019-07-02
Attending: SURGERY
Payer: COMMERCIAL

## 2019-07-02 VITALS
SYSTOLIC BLOOD PRESSURE: 134 MMHG | BODY MASS INDEX: 34.84 KG/M2 | RESPIRATION RATE: 16 BRPM | HEIGHT: 66 IN | OXYGEN SATURATION: 96 % | WEIGHT: 216.8 LBS | DIASTOLIC BLOOD PRESSURE: 95 MMHG | TEMPERATURE: 98.4 F | HEART RATE: 101 BPM

## 2019-07-02 DIAGNOSIS — L72.3 SEBACEOUS CYST: Primary | ICD-10-CM

## 2019-07-02 DIAGNOSIS — C43.59 MALIGNANT MELANOMA OF TORSO EXCLUDING BREAST (H): ICD-10-CM

## 2019-07-02 PROCEDURE — G0463 HOSPITAL OUTPT CLINIC VISIT: HCPCS | Mod: ZF

## 2019-07-02 RX ORDER — ACETAMINOPHEN 325 MG/1
975 TABLET ORAL ONCE
Status: CANCELLED | OUTPATIENT
Start: 2019-07-02 | End: 2019-07-02

## 2019-07-02 RX ORDER — CEFAZOLIN SODIUM 2 G/50ML
2 SOLUTION INTRAVENOUS
Status: CANCELLED | OUTPATIENT
Start: 2019-07-02

## 2019-07-02 RX ORDER — CEFAZOLIN SODIUM 1 G/50ML
1 INJECTION, SOLUTION INTRAVENOUS SEE ADMIN INSTRUCTIONS
Status: CANCELLED | OUTPATIENT
Start: 2019-07-02

## 2019-07-02 ASSESSMENT — PAIN SCALES - GENERAL: PAINLEVEL: NO PAIN (0)

## 2019-07-02 ASSESSMENT — MIFFLIN-ST. JEOR: SCORE: 1624.9

## 2019-07-02 NOTE — NURSING NOTE
"Oncology Rooming Note    July 2, 2019 12:01 PM   Nancy Bronson is a 49 year old female who presents for:    Chief Complaint   Patient presents with     Oncology Clinic Visit     [facility] NEW- MELANOMA     Initial Vitals: BP (!) 134/95 (BP Location: Right arm, Patient Position: Chair, Cuff Size: Adult Regular)   Pulse 101   Temp 98.4  F (36.9  C) (Oral)   Resp 16   Ht 1.676 m (5' 5.98\")   Wt 98.3 kg (216 lb 12.8 oz)   LMP 12/02/2012   SpO2 96%   BMI 35.01 kg/m   Estimated body mass index is 35.01 kg/m  as calculated from the following:    Height as of this encounter: 1.676 m (5' 5.98\").    Weight as of this encounter: 98.3 kg (216 lb 12.8 oz). Body surface area is 2.14 meters squared.  No Pain (0) Comment: Data Unavailable   Patient's last menstrual period was 12/02/2012.  Allergies reviewed: Yes  Medications reviewed: Yes    Medications: Medication refills not needed today.  Pharmacy name entered into [employer]:    [facility] [place], [place] - [address]  [id number] - [place], [place] - [address] [address]    Clinical concerns: No new concerns. Danisha was notified.      Lino Levine LPN            "

## 2019-07-02 NOTE — LETTER
"2019      RE: Nancy Bronson  [address] [facility] [address]     2019    Geoffrey Mauro  [facility]  [address] [place] [address]    RE: Nancy Bronson  (: 1970)    Dear Dr. Mauro     Your patient was seen for evaluation in my office.  Please find a copy of my notes for your record and review.  If you have any further questions, please feel free to contact my office.   Thank you for your kind referral.    Sincerely,   Susan Raman MD MSc [facility] FACS    ---        NEW CONSULTATION  2019    Nancy Bronson is a 49 year old female who presents with a lesion on the left upper back.  She was referred by Dr Mauro.    HPI:    She noted a raised lesion on her back in 2019 when it bled spontaneously.  She presented for evaluation in April.  It was not itchy.    A shave biopsy was performed by Marilee Colby PA-C on 2019.  It showed an at least 3.5 mm deep melanoma with ulceration and 10 mitoses per mm2.        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    FAMILY HISTORY:  Pancreatic ca: No  Melanoma: No  Other cancer: Yes father with \"skin cancer\" (dx 70, unsure which type)  Colon cancer, half aunt, maternal great-aunt  Lung cancer - maternal aunt and GF, smokers  Liver cancer - mat great GM  Leukemia - PGF    Past Medical History:   Diagnosis Date     Anemia      NO ACTIVE PROBLEMS    No MI, CVA, DM, asthma    Past Surgical History:   Procedure Laterality Date     ABDOMEN SURGERY  2012    hysterectomy     C C-SEC ONLY,PREV C-SEC  ,    2     C NONSPECIFIC PROCEDURE      " tonsils     C NONSPECIFIC PROCEDURE  1985    jaw surgery     DAVINCI HYSTERECTOMY SUPRACERVICAL  12/4/2012    benign pathProcedure: DAVINCI HYSTERECTOMY SUPRACERVICAL;  Davinci Supracervical Hysterectomy with Bilateral Salpingectomy, Cystoscopy ;  Surgeon: Selene Cardenas DO;  Location: [facility] OR     ENT SURGERY  1975     HEAD & NECK SURGERY  1986    jaw surgery     LAPAROSCOPIC TUBAL LIGATION  2007   No GA issues    Current Outpatient Medications   Medication Sig Dispense Refill     fluticasone (FLONASE) 50 MCG/ACT spray Spray 1 spray into both nostrils 2 times daily as needed for rhinitis or allergies 16 g 1     hydrocortisone (WESTCORT) 0.2 % external cream Apply topically 2 times daily 15 g 2        Allergies   Allergen Reactions     No Known Drug Allergies         SOCIAL HISTORY:  Smokes: No - quit at age 25  EtOH: Yes - occasionally  Illicit drugs: No    ROS  Headaches No  Vision changes No  Dizziness No  Abdominal pain No  Rectal bleeding Yes - with wiping, never had a colonoscopy before  Melena No   Chest pain No  Shortness of breath No  Unintentional weight loss No    LMP 12/02/2012   Physical Exam   Constitutional: She appears well-developed and well-nourished.   Lymphadenopathy:     She has no cervical adenopathy.        Right cervical: No superficial cervical, no deep cervical and no posterior cervical adenopathy present.       Left cervical: No superficial cervical, no deep cervical and no posterior cervical adenopathy present.     She has no axillary adenopathy.        Right axillary: No pectoral and no lateral adenopathy present.        Left axillary: No pectoral and no lateral adenopathy present.       Right: No supraclavicular and no epitrochlear adenopathy present.        Left: No supraclavicular and no epitrochlear adenopathy present.   Skin: Skin is warm and dry.             INVESTIGATIONS:    Biopsy from [facility] (6/12/2019) showed:  Final Diagnosis:  Skin, back, left,  upper  Melanoma  Type: Superficial spreading melanoma with prominent nodular component  Depth: 3.5mm  Ulceration: Present  Mitotic rate: 10 dermal mitoses/1 mm2   Margin status: positive lateral and deep margins     ASSESSMENT:  Nancy Bronson is a 49 year old female with truncal melanoma.    Her stage is:  Cancer Staging  Malignant melanoma of torso excluding breast (H)  Staging form: Melanoma of the Skin, AJCC 8th Edition  - Clinical: Stage IIB (cT3b, cN0, cM0) - Unsigned    The staging and natural history of melanoma was discussed with the patient and her daughter Syeda, and her mother Caitlin.      A wide local excision with appropriate margins is indicated to reduce the risk of local recurrence.  The risks of a wide local excision were discussed, including scar formation, bleeding, wound infection, wound dehiscence, seroma formation, and numbness of surrounding skin.  Given the large resultant defect and location on the back, I suspect complex wound closure may be needed. I recommended a referral to Plastic Surgery to facilitate this.    In addition to the surgical management of the skin, a sentinel lymph node biopsy is recommended for casimiro staging.  This is performed with the combination of the radioactive colloid and lymphazurin. The risks of a sentinel lymph node biopsy were discussed with the patient and family, including the risks of lymphedema, bleeding, wound infection, wound dehiscence, seroma formation, and paresthesias. There is also a small risk of anaphylaxis with lymphazurin injection as part of the procedure.  The findings of the sentinel lymph node biopsy may result in the need for further staging evaluations, surgery (i.e. Completion lymph node dissection) +/- radiation.  Mapping will be determined pre-operatively with a lymphoscintigram.  There is a 10% chance of patients whose sentinel lymph nodes do not map. Should this be the case, we discussed that we would not pursue surgical casimiro  staging, but would instead proceed with casimiro surveillance with imaging.     We discussed that surgical pathology results will be reviewed in person in clinic, at the postoperative visit. Because pathology reports are often complicated, results are not reviewed by phone.  Reviewing in person would also allow for careful discussion of next steps and for answering questions.    Finally, we discussed that patients with a diagnosis of melanoma are at risk of recurrence (local and distant) and of subsequent de sherrill melanoma.  Nancy Bronson will need to proceed with quarterly dermatologic full skin survey and evaluation.  I also reviewed the importance of protection from sun exposure, including wearing long sleeves, hats, etc and also the use of sunscreen with SPF of at least 35, with frequent re-applications.    All of the above were discussed with the patient and all questions answered.  Nancy Bronson elected to proceed with wide local excision of the left upper back melanoma and sentinel lymph node mapping and biopsy.     Total time spent with the patient was 30 minutes, of which 75% was counseling.     PLAN:  1. Wide local excision of left upper back melanoma, sentinel lymph node mapping and biospy  2. Sebaceous cyst will be included in the excision given its proximity  3. Plastic surgery referral for complex wound closure  4. Patient to report to her PCP for preoperative H&P and testing.  5. She will contact her dermatologist for a baseline full skin survey.    Susan Raman MD MSc [facility] FACS    Division of Surgical Oncology  [facility]

## 2019-07-02 NOTE — Clinical Note
"7/2/2019       RE: Nancy Bronson  [address] [facility] [address]     Dear Colleague,    Thank you for referring your patient, Nancy Bronson, to the [facility] at [facility]. Please see a copy of my visit note below.    NEW CONSULTATION  Jul 2, 2019    Nancy Bronson is a 49 year old female who presents with a lesion on the left upper back.  She was referred by Dr Mauro.    HPI:    She noted a raised lesion on her back in March 2019 when it bled spontaneously.  She presented for evaluation in April.  It was not itchy.    A shave biopsy was performed by Marilee Colby PA-C on 6/12/2019.  It showed an at least 3.5 mm deep melanoma with ulceration and 10 mitoses per mm2.        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    FAMILY HISTORY:  Pancreatic ca: No  Melanoma: No  Other cancer: Yes father with \"skin cancer\" (dx 70, unsure which type)  Colon cancer, half aunt, maternal great-aunt  Lung cancer - maternal aunt and GF, smokers  Liver cancer - mat great GM  Leukemia - PGF    Past Medical History:   Diagnosis Date     Anemia      NO ACTIVE PROBLEMS    No MI, CVA, DM, asthma    Past Surgical History:   Procedure Laterality Date     ABDOMEN SURGERY  2012    hysterectomy     C C-SEC ONLY,PREV C-SEC  2001,2004    2     C NONSPECIFIC PROCEDURE  1975    tonsils     C NONSPECIFIC PROCEDURE  1985    jaw surgery     DAVINCI HYSTERECTOMY SUPRACERVICAL  12/4/2012    benign pathProcedure: DAVINCI HYSTERECTOMY SUPRACERVICAL;  Davinci Supracervical Hysterectomy with Bilateral Salpingectomy, " Cystoscopy ;  Surgeon: Selene Cardenas DO;  Location: [facility] OR     ENT SURGERY  1975     HEAD & NECK SURGERY  1986    jaw surgery     LAPAROSCOPIC TUBAL LIGATION  2007   No GA issues    Current Outpatient Medications   Medication Sig Dispense Refill     fluticasone (FLONASE) 50 MCG/ACT spray Spray 1 spray into both nostrils 2 times daily as needed for rhinitis or allergies 16 g 1     hydrocortisone (WESTCORT) 0.2 % external cream Apply topically 2 times daily 15 g 2        Allergies   Allergen Reactions     No Known Drug Allergies         SOCIAL HISTORY:  Smokes: No - quit at age 25  EtOH: Yes - occasionally  Illicit drugs: No    ROS  Headaches No  Vision changes No  Dizziness No  Abdominal pain No  Rectal bleeding Yes - with wiping, never had a colonoscopy before  Melena No   Chest pain No  Shortness of breath No  Unintentional weight loss No    LMP 12/02/2012   Physical Exam   Constitutional: She appears well-developed and well-nourished.   Lymphadenopathy:     She has no cervical adenopathy.        Right cervical: No superficial cervical, no deep cervical and no posterior cervical adenopathy present.       Left cervical: No superficial cervical, no deep cervical and no posterior cervical adenopathy present.     She has no axillary adenopathy.        Right axillary: No pectoral and no lateral adenopathy present.        Left axillary: No pectoral and no lateral adenopathy present.       Right: No supraclavicular and no epitrochlear adenopathy present.        Left: No supraclavicular and no epitrochlear adenopathy present.   Skin: Skin is warm and dry.             INVESTIGATIONS:    Biopsy from [facility] (6/12/2019) showed:  Final Diagnosis:  Skin, back, left, upper  Melanoma  Type: Superficial spreading melanoma with prominent nodular component  Depth: 3.5mm  Ulceration: Present  Mitotic rate: 10 dermal mitoses/1 mm2   Margin status: positive lateral and deep margins     ASSESSMENT:  Nancy bacon  a 49 year old female with truncal melanoma.    Her stage is:  Cancer Staging  Malignant melanoma of torso excluding breast (H)  Staging form: Melanoma of the Skin, AJCC 8th Edition  - Clinical: Stage IIB (cT3b, cN0, cM0) - Unsigned    The staging and natural history of melanoma was discussed with the patient and her daughter Syeda, and her mother Caitlin.      A wide local excision with appropriate margins is indicated to reduce the risk of local recurrence.  The risks of a wide local excision were discussed, including scar formation, bleeding, wound infection, wound dehiscence, seroma formation, and numbness of surrounding skin.  Given the large resultant defect and location on the back, I suspect complex wound closure may be needed. I recommended a referral to Plastic Surgery to facilitate this.    In addition to the surgical management of the skin, a sentinel lymph node biopsy is recommended for casimiro staging.  This is performed with the combination of the radioactive colloid and lymphazurin. The risks of a sentinel lymph node biopsy were discussed with the patient and family, including the risks of lymphedema, bleeding, wound infection, wound dehiscence, seroma formation, and paresthesias. There is also a small risk of anaphylaxis with lymphazurin injection as part of the procedure.  The findings of the sentinel lymph node biopsy may result in the need for further staging evaluations, surgery (i.e. Completion lymph node dissection) +/- radiation.  Mapping will be determined pre-operatively with a lymphoscintigram.  There is a 10% chance of patients whose sentinel lymph nodes do not map. Should this be the case, we discussed that we would not pursue surgical casimiro staging, but would instead proceed with casimiro surveillance with imaging.     We discussed that surgical pathology results will be reviewed in person in clinic, at the postoperative visit. Because pathology reports are often complicated, results are not  reviewed by phone.  Reviewing in person would also allow for careful discussion of next steps and for answering questions.    Finally, we discussed that patients with a diagnosis of melanoma are at risk of recurrence (local and distant) and of subsequent de sherrill melanoma.  Nancy Bronson will need to proceed with quarterly dermatologic full skin survey and evaluation.  I also reviewed the importance of protection from sun exposure, including wearing long sleeves, hats, etc and also the use of sunscreen with SPF of at least 35, with frequent re-applications.    All of the above were discussed with the patient and all questions answered.  Nancy Bronson elected to proceed with wide local excision of the left upper back melanoma and sentinel lymph node mapping and biopsy.     Total time spent with the patient was 30 minutes, of which 75% was counseling.     PLAN:  1. Wide local excision of left upper back melanoma, sentinel lymph node mapping and biospy  2. Sebaceous cyst will be included in the excision given its proximity  3. Plastic surgery referral for complex wound closure  4. Patient to report to her PCP for preoperative H&P and testing.    Susan Raman MD MSc [facility] FACS    Division of Surgical Oncology  [facility]     Again, thank you for allowing me to participate in the care of your patient.      Sincerely,    Susan Raman MD

## 2019-07-02 NOTE — Clinical Note
"7/2/2019      RE: Nancy Bronson  [address] [address]       NEW CONSULTATION  Jul 2, 2019    Nancy Bronson is a 49 year old female who presents with a lesion on the left upper back.  She was referred by Dr Mauro.    HPI:    She noted a raised lesion on her back in March 2019 when it bled spontaneously.  She presented for evaluation in April.  It was not itchy.    A shave biopsy was performed by Marilee Colby PA-C on 6/12/2019.  It showed an at least 3.5 mm deep melanoma with ulceration and 10 mitoses per mm2.        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    FAMILY HISTORY:  Pancreatic ca: No  Melanoma: No  Other cancer: Yes father with \"skin cancer\" (dx 70, unsure which type)  Colon cancer, half aunt, maternal great-aunt  Lung cancer - maternal aunt and GF, smokers  Liver cancer - mat great GM  Leukemia - PGF    Past Medical History:   Diagnosis Date     Anemia      NO ACTIVE PROBLEMS    No MI, CVA, DM, asthma    Past Surgical History:   Procedure Laterality Date     ABDOMEN SURGERY  2012    hysterectomy     C C-SEC ONLY,PREV C-SEC  2001,2004    2     C NONSPECIFIC PROCEDURE  1975    tonsils     C NONSPECIFIC PROCEDURE  1985    jaw surgery     DAVINCI HYSTERECTOMY SUPRACERVICAL  12/4/2012    benign pathProcedure: DAVINCI HYSTERECTOMY SUPRACERVICAL;  Davinci Supracervical Hysterectomy with Bilateral Salpingectomy, Cystoscopy ;  Surgeon: Selene Cardenas DO;  Location: [facility] OR     ENT SURGERY  1975     HEAD & NECK SURGERY  1986    jaw surgery     LAPAROSCOPIC TUBAL LIGATION  2007   No GA issues    Current " Outpatient Medications   Medication Sig Dispense Refill     fluticasone (FLONASE) 50 MCG/ACT spray Spray 1 spray into both nostrils 2 times daily as needed for rhinitis or allergies 16 g 1     hydrocortisone (WESTCORT) 0.2 % external cream Apply topically 2 times daily 15 g 2        Allergies   Allergen Reactions     No Known Drug Allergies         SOCIAL HISTORY:  Smokes: No - quit at age 25  EtOH: Yes - occasionally  Illicit drugs: No    ROS  Headaches No  Vision changes No  Dizziness No  Abdominal pain No  Rectal bleeding Yes - with wiping, never had a colonoscopy before  Melena No   Chest pain No  Shortness of breath No  Unintentional weight loss No    LMP 12/02/2012   Physical Exam   Constitutional: She appears well-developed and well-nourished.   Lymphadenopathy:     She has no cervical adenopathy.        Right cervical: No superficial cervical, no deep cervical and no posterior cervical adenopathy present.       Left cervical: No superficial cervical, no deep cervical and no posterior cervical adenopathy present.     She has no axillary adenopathy.        Right axillary: No pectoral and no lateral adenopathy present.        Left axillary: No pectoral and no lateral adenopathy present.       Right: No supraclavicular and no epitrochlear adenopathy present.        Left: No supraclavicular and no epitrochlear adenopathy present.   Skin: Skin is warm and dry.             INVESTIGATIONS:    Biopsy from [facility] (6/12/2019) showed:  Final Diagnosis:  Skin, back, left, upper  Melanoma  Type: Superficial spreading melanoma with prominent nodular component  Depth: 3.5mm  Ulceration: Present  Mitotic rate: 10 dermal mitoses/1 mm2   Margin status: positive lateral and deep margins     ASSESSMENT:  Nancy Bronson is a 49 year old female with truncal melanoma.    Her stage is:  Cancer Staging  Malignant melanoma of torso excluding breast (H)  Staging form: Melanoma of the Skin, AJCC 8th Edition  - Clinical:  Stage IIB (cT3b, cN0, cM0) - Unsigned    The staging and natural history of melanoma was discussed with the patient and her daughter Syeda, and her mother Caitlin.      A wide local excision with appropriate margins is indicated to reduce the risk of local recurrence.  The risks of a wide local excision were discussed, including scar formation, bleeding, wound infection, wound dehiscence, seroma formation, and numbness of surrounding skin.  Given the large resultant defect and location on the back, I suspect complex wound closure may be needed. I recommended a referral to Plastic Surgery to facilitate this.    In addition to the surgical management of the skin, a sentinel lymph node biopsy is recommended for casimiro staging.  This is performed with the combination of the radioactive colloid and lymphazurin. The risks of a sentinel lymph node biopsy were discussed with the patient and family, including the risks of lymphedema, bleeding, wound infection, wound dehiscence, seroma formation, and paresthesias. There is also a small risk of anaphylaxis with lymphazurin injection as part of the procedure.  The findings of the sentinel lymph node biopsy may result in the need for further staging evaluations, surgery (i.e. Completion lymph node dissection) +/- radiation.  Mapping will be determined pre-operatively with a lymphoscintigram.  There is a 10% chance of patients whose sentinel lymph nodes do not map. Should this be the case, we discussed that we would not pursue surgical casimiro staging, but would instead proceed with casimiro surveillance with imaging.     We discussed that surgical pathology results will be reviewed in person in clinic, at the postoperative visit. Because pathology reports are often complicated, results are not reviewed by phone.  Reviewing in person would also allow for careful discussion of next steps and for answering questions.    Finally, we discussed that patients with a diagnosis of melanoma are  at risk of recurrence (local and distant) and of subsequent de sherrill melanoma.  Nancy Bronson will need to proceed with quarterly dermatologic full skin survey and evaluation.  I also reviewed the importance of protection from sun exposure, including wearing long sleeves, hats, etc and also the use of sunscreen with SPF of at least 35, with frequent re-applications.    All of the above were discussed with the patient and all questions answered.  Nancy Bronson elected to proceed with wide local excision of the left upper back melanoma and sentinel lymph node mapping and biopsy.     Total time spent with the patient was 30 minutes, of which 75% was counseling.     PLAN:  1. Wide local excision of left upper back melanoma, sentinel lymph node mapping and biospy  2. Sebaceous cyst will be included in the excision given its proximity  3. Plastic surgery referral for complex wound closure  4. Patient to report to her PCP for preoperative H&P and testing.    Susan Raman MD MSc [facility]    Division of Surgical Oncology  [facility]     Susan Raman MD

## 2019-07-02 NOTE — LETTER
"2019      RE: Nancy Bronson  [address] [facility] [address]     2019    Selene Cardenas, DO   [address]  [facility] [address]    RE: Nancy Bronson  (: 1970)    Dear Dr. Selene Cardenas    Your patient was seen for evaluation in my office.  Please find a copy of my notes for your record and review.  If you have any further questions, please feel free to contact my office.   Thank you for your kind referral.    Sincerely,   Susan Raman MD MSc [facility] FACS    ---   NEW CONSULTATION  2019    Nancy Bronson is a 49 year old female who presents with a lesion on the left upper back.  She was referred by Dr Mauro.    HPI:    She noted a raised lesion on her back in 2019 when it bled spontaneously.  She presented for evaluation in April.  It was not itchy.    A shave biopsy was performed by Marilee Colby PA-C on 2019.  It showed an at least 3.5 mm deep melanoma with ulceration and 10 mitoses per mm2.        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    FAMILY HISTORY:  Pancreatic ca: No  Melanoma: No  Other cancer: Yes father with \"skin cancer\" (dx 70, unsure which type)  Colon cancer, half aunt, maternal great-aunt  Lung cancer - maternal aunt and GF, smokers  Liver cancer - mat great GM  Leukemia - PGF    Past Medical History:   Diagnosis Date     Anemia      NO ACTIVE PROBLEMS    No MI, CVA, DM, asthma    Past Surgical History:   Procedure Laterality Date     ABDOMEN SURGERY      hysterectomy     C C-SEC ONLY,PREV C-SEC  ,    2     C NONSPECIFIC PROCEDURE  1975    tonsils "     C NONSPECIFIC PROCEDURE  1985    jaw surgery     DAVINCI HYSTERECTOMY SUPRACERVICAL  12/4/2012    benign pathProcedure: DAVINCI HYSTERECTOMY SUPRACERVICAL;  Davinci Supracervical Hysterectomy with Bilateral Salpingectomy, Cystoscopy ;  Surgeon: Selene Cardenas DO;  Location: [facility] OR     ENT SURGERY  1975     HEAD & NECK SURGERY  1986    jaw surgery     LAPAROSCOPIC TUBAL LIGATION  2007   No GA issues    Current Outpatient Medications   Medication Sig Dispense Refill     fluticasone (FLONASE) 50 MCG/ACT spray Spray 1 spray into both nostrils 2 times daily as needed for rhinitis or allergies 16 g 1     hydrocortisone (WESTCORT) 0.2 % external cream Apply topically 2 times daily 15 g 2        Allergies   Allergen Reactions     No Known Drug Allergies         SOCIAL HISTORY:  Smokes: No - quit at age 25  EtOH: Yes - occasionally  Illicit drugs: No    ROS  Headaches No  Vision changes No  Dizziness No  Abdominal pain No  Rectal bleeding Yes - with wiping, never had a colonoscopy before  Melena No   Chest pain No  Shortness of breath No  Unintentional weight loss No    LMP 12/02/2012   Physical Exam   Constitutional: She appears well-developed and well-nourished.   Lymphadenopathy:     She has no cervical adenopathy.        Right cervical: No superficial cervical, no deep cervical and no posterior cervical adenopathy present.       Left cervical: No superficial cervical, no deep cervical and no posterior cervical adenopathy present.     She has no axillary adenopathy.        Right axillary: No pectoral and no lateral adenopathy present.        Left axillary: No pectoral and no lateral adenopathy present.       Right: No supraclavicular and no epitrochlear adenopathy present.        Left: No supraclavicular and no epitrochlear adenopathy present.   Skin: Skin is warm and dry.             INVESTIGATIONS:    Biopsy from [facility] (6/12/2019) showed:  Final Diagnosis:  Skin, back, left, upper  Melanoma  Type:  Superficial spreading melanoma with prominent nodular component  Depth: 3.5mm  Ulceration: Present  Mitotic rate: 10 dermal mitoses/1 mm2   Margin status: positive lateral and deep margins     ASSESSMENT:  Nancy Bronson is a 49 year old female with truncal melanoma.    Her stage is:  Cancer Staging  Malignant melanoma of torso excluding breast (H)  Staging form: Melanoma of the Skin, AJCC 8th Edition  - Clinical: Stage IIB (cT3b, cN0, cM0) - Unsigned    The staging and natural history of melanoma was discussed with the patient and her daughter Syeda, and her mother Caitlin.      A wide local excision with appropriate margins is indicated to reduce the risk of local recurrence.  The risks of a wide local excision were discussed, including scar formation, bleeding, wound infection, wound dehiscence, seroma formation, and numbness of surrounding skin.  Given the large resultant defect and location on the back, I suspect complex wound closure may be needed. I recommended a referral to Plastic Surgery to facilitate this.    In addition to the surgical management of the skin, a sentinel lymph node biopsy is recommended for casimiro staging.  This is performed with the combination of the radioactive colloid and lymphazurin. The risks of a sentinel lymph node biopsy were discussed with the patient and family, including the risks of lymphedema, bleeding, wound infection, wound dehiscence, seroma formation, and paresthesias. There is also a small risk of anaphylaxis with lymphazurin injection as part of the procedure.  The findings of the sentinel lymph node biopsy may result in the need for further staging evaluations, surgery (i.e. Completion lymph node dissection) +/- radiation.  Mapping will be determined pre-operatively with a lymphoscintigram.  There is a 10% chance of patients whose sentinel lymph nodes do not map. Should this be the case, we discussed that we would not pursue surgical casimiro staging, but would instead  proceed with casimiro surveillance with imaging.     We discussed that surgical pathology results will be reviewed in person in clinic, at the postoperative visit. Because pathology reports are often complicated, results are not reviewed by phone.  Reviewing in person would also allow for careful discussion of next steps and for answering questions.    Finally, we discussed that patients with a diagnosis of melanoma are at risk of recurrence (local and distant) and of subsequent de sherrill melanoma.  Nancy Bronson will need to proceed with quarterly dermatologic full skin survey and evaluation.  I also reviewed the importance of protection from sun exposure, including wearing long sleeves, hats, etc and also the use of sunscreen with SPF of at least 35, with frequent re-applications.    All of the above were discussed with the patient and all questions answered.  Nancy Bronson elected to proceed with wide local excision of the left upper back melanoma and sentinel lymph node mapping and biopsy.     Total time spent with the patient was 30 minutes, of which 75% was counseling.     PLAN:  1. Wide local excision of left upper back melanoma, sentinel lymph node mapping and biospy  2. Sebaceous cyst will be included in the excision given its proximity  3. Plastic surgery referral for complex wound closure  4. Patient to report to her PCP for preoperative H&P and testing.  5. She will contact her dermatologist for a baseline full skin survey.    Susan Raman MD MSc [facility] FACS    Division of Surgical Oncology  [facility]

## 2019-07-05 ENCOUNTER — TELEPHONE (OUTPATIENT)
Dept: ONCOLOGY | Facility: CLINIC | Age: 49
End: 2019-07-05

## 2019-07-05 NOTE — TELEPHONE ENCOUNTER
Spoke/left message with: Elpidio to schedule surgery with Dr Susan Raman     [facility] was scheduled [facility] 7[facility]    Patient will have pre-surgery visit with PCP - tbd    Nuclear Medicine: yes [id number]    Implants/Special Equipment:   UNKNOWN, WILL UPDATE AFTER PT SEES PLASTICS    Wire Loc:      -Patient advised H&P is needed or surgery cancellation may occur    Post-Op care appointment scheduled?  NO on      Patient is aware a / is needed day of surgery.     Surgery packet was delivered to patient via [employer], patient has my direct contact information for any further questions.        Additional comments: Patient to go to ASC after Nuc Med, will confirm time and follow up after pt sees Dr Tate Mercado  [facility]-Op Coordinator  [phone]

## 2019-07-09 ENCOUNTER — PRE VISIT (OUTPATIENT)
Dept: SURGERY | Facility: CLINIC | Age: 49
End: 2019-07-09

## 2019-07-09 NOTE — TELEPHONE ENCOUNTER
FUTURE VISIT INFORMATION      FUTURE VISIT INFORMATION:    Date: 7/10/19    Time: 9:30am    Location: [facility]  REFERRAL INFORMATION:    Referring provider:  Susan Raman    Referring providers clinic:   [facility]    Reason for visit/diagnosis  back melanoma    RECORDS REQUESTED FROM:       Clinic name Comments Records Status Imaging Status    [facility] OV/referral Danisha 7/2/19 CHARLA

## 2019-07-10 ENCOUNTER — OFFICE VISIT (OUTPATIENT)
Dept: PLASTIC SURGERY | Facility: CLINIC | Age: 49
End: 2019-07-10
Payer: COMMERCIAL

## 2019-07-10 VITALS — BODY MASS INDEX: 34.72 KG/M2 | WEIGHT: 216 LBS | HEIGHT: 66 IN

## 2019-07-10 DIAGNOSIS — C43.59 MALIGNANT MELANOMA OF TORSO EXCLUDING BREAST (H): Primary | ICD-10-CM

## 2019-07-10 RX ORDER — CEFAZOLIN SODIUM 1 G/50ML
1 INJECTION, SOLUTION INTRAVENOUS SEE ADMIN INSTRUCTIONS
Status: CANCELLED | OUTPATIENT
Start: 2019-07-10

## 2019-07-10 RX ORDER — CEFAZOLIN SODIUM 2 G/50ML
2 SOLUTION INTRAVENOUS
Status: CANCELLED | OUTPATIENT
Start: 2019-07-10

## 2019-07-10 ASSESSMENT — MIFFLIN-ST. JEOR: SCORE: 1621.52

## 2019-07-10 ASSESSMENT — PAIN SCALES - GENERAL: PAINLEVEL: NO PAIN (0)

## 2019-07-10 NOTE — NURSING NOTE
"Chief Complaint   Patient presents with     Consult     new pt here for consult for melanoma on back, DOS 7/18 with Dr. Raman       Vitals:    07/10/19 0918   Weight: 98 kg (216 lb)   Height: 1.676 m (5' 6\")       Body mass index is 34.86 kg/m .    Ry Oquendo NREMT     Vitals not taken per provider order.    Unable to obtain history as provider wanted to see patient.    "

## 2019-07-10 NOTE — LETTER
7/10/2019       RE: Nancy Bronson  [address] [facility] [address]     Dear Colleague,    Thank you for referring your patient, Nancy Bronson, to the [facility] PLASTIC AND RECONSTRUCTIVE SURGERY at [facility]. Please see a copy of my visit note below.    REFERRING PHYSICIAN:  Susan Raman MD      PRESENTING COMPLAINT:  Consultation for mid back reconstruction after melanoma excision.      HISTORY OF PRESENTING COMPLAINT:  Ms. Bronson is 49 years old.  She has been diagnosed with malignant melanoma of her left mid to upper back region.  She is 2.5 mm thick 10 mitoses per high power field.  She has planned a 2 cm margin excision next week and I have been consulted to discuss options for reconstruction.      PAST MEDICAL HISTORY:  None.      PAST SURGICAL HISTORY:  Jaw surgery, tubal ligation, hysterectomy.      MEDICATIONS:  None.      ALLERGIES:  None.      SOCIAL HISTORY:  Used to smoke about 1/2 pack a day for 10 years, quit 25 years ago, drinks socially.  Works as a .      REVIEW OF SYSTEMS:  Denies chest pain, shortness of breath, MI, CVA, DVT and PE.      PHYSICAL EXAMINATION:  Vital signs are stable.  She is afebrile, in no obvious distress.  She is 5 feet 6 inches, 219 pounds, BMI 35 kg/m2.  On examination of her back, she has a scar about 1 x 1 cm in the mid portion of the back just to the left of the midline.      ASSESSMENT AND PLAN:  Based on above findings, a diagnosis of a malignant melanoma of the mid back region was made.  I had a discussion with the patient about closure of the area, most likely we will get primary closure.  If not, then some kind of local tissue rearrangement possibly a V-Y advancement or a Keystone type perforated base flap will be undertaken.  Last option would be a skin graft.  All of this was discussed with the patient.  She understood.  All risks, benefits, and alternatives of the procedure including pain, infection,  bleeding, scarring, asymmetry, seromas, hematomas, wound breakdown, wound dehiscence, prominent scar, hypertrophic scar, keloid scar, breakdown of the wound, requirement of further surgeries depending on pathology, injury to deeper structures, numbness, DVT, PE, MI, CVA, pneumonia, renal failure and death were all explained.  She understood them all and wants to proceed.  She is scheduled for next week. I am happy to help.  All questions were answered.  She was happy with the visit.       Total time spent with patient 30 minutes, more than half was counseling.      cc:    Susan Raman MD    [address] [place] [id number]     Again, thank you for allowing me to participate in the care of your patient.      Sincerely,    DIPAK Ybarra MD

## 2019-07-10 NOTE — PROGRESS NOTES
REFERRING PHYSICIAN:  Susan Raman MD      PRESENTING COMPLAINT:  Consultation for mid back reconstruction after melanoma excision.      HISTORY OF PRESENTING COMPLAINT:  Ms. Bronson is 49 years old.  She has been diagnosed with malignant melanoma of her left mid to upper back region.  She is 2.5 mm thick 10 mitoses per high power field.  She has planned a 2 cm margin excision next week and I have been consulted to discuss options for reconstruction.      PAST MEDICAL HISTORY:  None.      PAST SURGICAL HISTORY:  Jaw surgery, tubal ligation, hysterectomy.      MEDICATIONS:  None.      ALLERGIES:  None.      SOCIAL HISTORY:  Used to smoke about 1/2 pack a day for 10 years, quit 25 years ago, drinks socially.  Works as a .      REVIEW OF SYSTEMS:  Denies chest pain, shortness of breath, MI, CVA, DVT and PE.      PHYSICAL EXAMINATION:  Vital signs are stable.  She is afebrile, in no obvious distress.  She is 5 feet 6 inches, 219 pounds, BMI 35 kg/m2.  On examination of her back, she has a scar about 1 x 1 cm in the mid portion of the back just to the left of the midline.      ASSESSMENT AND PLAN:  Based on above findings, a diagnosis of a malignant melanoma of the mid back region was made.  I had a discussion with the patient about closure of the area, most likely we will get primary closure.  If not, then some kind of local tissue rearrangement possibly a V-Y advancement or a Keystone type perforated base flap will be undertaken.  Last option would be a skin graft.  All of this was discussed with the patient.  She understood.  All risks, benefits, and alternatives of the procedure including pain, infection, bleeding, scarring, asymmetry, seromas, hematomas, wound breakdown, wound dehiscence, prominent scar, hypertrophic scar, keloid scar, breakdown of the wound, requirement of further surgeries depending on pathology, injury to deeper structures, numbness, DVT, PE, MI, CVA, pneumonia, renal failure and death were  all explained.  She understood them all and wants to proceed.  She is scheduled for next week. I am happy to help.  All questions were answered.  She was happy with the visit.       Total time spent with patient 30 minutes, more than half was counseling.      cc:    Susan Raman MD    [address] [place] [phone]

## 2019-07-11 NOTE — TELEPHONE ENCOUNTER
Spoke/left message with: Nancy to schedule surgery with Dr Lynn Ybarra and Dr Susan Raman     Surgery was scheduled [facility] 7[facility]    Patient will have pre-surgery visit with PCP - 7/12    Nuclear Medicine: 10:00 am, directions given for parking at [facility], shuttle to [place], back to [facility] for surgery    Implants/Special Equipment: na    Wire Loc: na    -Patient advised H&P is needed or surgery cancellation may occur    Post-Op care appointment scheduled?  YES on 8/30    Patient is aware a / is needed day of surgery.     Surgery packet was delivered to patient via [employer], patient has my direct contact information for any further questions.        Additional comments:       Yessica Mercado  [facility]-Op Coordinator  [phone]

## 2019-07-12 ENCOUNTER — OFFICE VISIT (OUTPATIENT)
Dept: FAMILY MEDICINE | Facility: CLINIC | Age: 49
End: 2019-07-12
Payer: COMMERCIAL

## 2019-07-12 ENCOUNTER — PRE VISIT (OUTPATIENT)
Dept: ONCOLOGY | Facility: CLINIC | Age: 49
End: 2019-07-12

## 2019-07-12 VITALS
SYSTOLIC BLOOD PRESSURE: 120 MMHG | TEMPERATURE: 98.3 F | HEIGHT: 66 IN | OXYGEN SATURATION: 96 % | HEART RATE: 94 BPM | DIASTOLIC BLOOD PRESSURE: 70 MMHG | WEIGHT: 217.2 LBS | BODY MASS INDEX: 34.91 KG/M2 | RESPIRATION RATE: 16 BRPM

## 2019-07-12 DIAGNOSIS — Z01.818 PREOP GENERAL PHYSICAL EXAM: Primary | ICD-10-CM

## 2019-07-12 LAB
ERYTHROCYTE [DISTWIDTH] IN BLOOD BY AUTOMATED COUNT: 16.3 % (ref 10–15)
HCT VFR BLD AUTO: 41.7 % (ref 35–47)
HGB BLD-MCNC: 13.7 G/DL (ref 11.7–15.7)
MCH RBC QN AUTO: 28.9 PG (ref 26.5–33)
MCHC RBC AUTO-ENTMCNC: 32.9 G/DL (ref 31.5–36.5)
MCV RBC AUTO: 88 FL (ref 78–100)
PLATELET # BLD AUTO: 284 10E9/L (ref 150–450)
RBC # BLD AUTO: 4.74 10E12/L (ref 3.8–5.2)
WBC # BLD AUTO: 12.3 10E9/L (ref 4–11)

## 2019-07-12 PROCEDURE — 36415 COLL VENOUS BLD VENIPUNCTURE: CPT | Performed by: PHYSICIAN ASSISTANT

## 2019-07-12 PROCEDURE — 85027 COMPLETE CBC AUTOMATED: CPT | Performed by: PHYSICIAN ASSISTANT

## 2019-07-12 PROCEDURE — 80048 BASIC METABOLIC PNL TOTAL CA: CPT | Performed by: PHYSICIAN ASSISTANT

## 2019-07-12 PROCEDURE — 99214 OFFICE O/P EST MOD 30 MIN: CPT | Performed by: PHYSICIAN ASSISTANT

## 2019-07-12 ASSESSMENT — MIFFLIN-ST. JEOR: SCORE: 1626.96

## 2019-07-12 NOTE — PROGRESS NOTES
[facility]  [id number] [place] [id number] [phone]  Dept: [phone]    PRE-OP EVALUATION:  Today's date: 2019    Nancy Bronson (: 1970) presents for pre-operative evaluation assessment as requested by Dr. Raman.  She requires evaluation and anesthesia risk assessment prior to undergoing surgery/procedure for treatment of Skin cancer .    Fax number for surgical facility:   Primary Physician: Selene Cardenas  Type of Anesthesia Anticipated: General    Patient has a Health Care Directive or Living Will:  NO    Preop Questions 7/10/2019   Who is doing your surgery? Dr. Susan Raman   What are you having done? Remove melanoma   Date of Surgery/Procedure:    Facility or Hospital where procedure/surgery will be performed: [facility]   1.  Do you have a history of Heart attack, stroke, stent, coronary bypass surgery, or other heart surgery? No   2.  Do you ever have any pain or discomfort in your chest? No   3.  Do you have a history of  Heart Failure? No   4.   Are you troubled by shortness of breath when:  walking on a level surface, or up a slight hill, or at night? No   5.  Do you currently have a cold, bronchitis or other respiratory infection? No   6.  Do you have a cough, shortness of breath, or wheezing? No   7.  Do you sometimes get pains in the calves of your legs when you walk? No   8. Do you or anyone in your family have previous history of blood clots? No   9.  Do you or does anyone in your family have a serious bleeding problem such as prolonged bleeding following surgeries or cuts? No   10. Have you ever had problems with anemia or been told to take iron pills? YES - before hysterectomy in    11. Have you had any abnormal blood loss such as black, tarry or bloody stools, or abnormal vaginal bleeding? No   12. Have you ever had a blood transfusion? No   13. Have you or any of your relatives ever had problems with anesthesia? No   14.  Do you have sleep apnea, excessive snoring or daytime drowsiness? No   15. Do you have any prosthetic heart valves? No   16. Do you have prosthetic joints? No   17. Is there any chance that you may be pregnant? No         HPI:     HPI related to upcoming procedure: noticed in March      See problem list for active medical problems.  Problems all longstanding and stable, except as noted/documented.  See ROS for pertinent symptoms related to these conditions.      MEDICAL HISTORY:     Patient Active Problem List    Diagnosis Date Noted     Malignant melanoma of torso excluding breast (H) 07/01/2019     Priority: Medium     Anemia 10/01/2013     Priority: Medium     S/P hysterectomy 01/22/2013     Priority: Medium     Snoring 02/24/2011     Priority: Medium      Past Medical History:   Diagnosis Date     Anemia      NO ACTIVE PROBLEMS      Past Surgical History:   Procedure Laterality Date     ABDOMEN SURGERY  2012    hysterectomy     C C-SEC ONLY,PREV C-SEC  2001,2004    2     C NONSPECIFIC PROCEDURE  1975    tonsils     C NONSPECIFIC PROCEDURE  1985    jaw surgery     DAVINCI HYSTERECTOMY SUPRACERVICAL  12/4/2012    benign pathProcedure: DAVINCI HYSTERECTOMY SUPRACERVICAL;  Davinci Supracervical Hysterectomy with Bilateral Salpingectomy, Cystoscopy ;  Surgeon: Selene Cardenas DO;  Location: [facility] OR     ENT SURGERY  1975     HEAD & NECK SURGERY  1986    jaw surgery     LAPAROSCOPIC TUBAL LIGATION  2007     Current Outpatient Medications   Medication Sig Dispense Refill     fluticasone (FLONASE) 50 MCG/ACT spray Spray 1 spray into both nostrils 2 times daily as needed for rhinitis or allergies 16 g 1     hydrocortisone (WESTCORT) 0.2 % external cream Apply topically 2 times daily 15 g 2     OTC products: no recent use of OTC ASA, NSAIDS or Steroids    Allergies   Allergen Reactions     No Known Drug Allergies       Latex Allergy: NO    Social History     Tobacco Use     Smoking status: Former Smoker     Smokeless  "tobacco: Never Used     Tobacco comment: quit in 1995   Substance Use Topics     Alcohol use: Yes     Comment: social     History   Drug Use No       REVIEW OF SYSTEMS:   Constitutional, neuro, ENT, endocrine, pulmonary, cardiac, gastrointestinal, genitourinary, musculoskeletal, integument and psychiatric systems are negative, except as otherwise noted.    EXAM:   /70 (BP Location: Right arm, Patient Position: Chair, Cuff Size: Adult Large)   Pulse 94   Temp 98.3  F (36.8  C) (Oral)   Resp 16   Ht 1.676 m (5' 6\")   Wt 98.5 kg (217 lb 3.2 oz)   LMP 12/02/2012   SpO2 96%   Breastfeeding? No   BMI 35.06 kg/m      GENERAL APPEARANCE: healthy, alert and no distress     EYES: EOMI, PERRL     HENT: ear canals and TM's normal and nose and mouth without ulcers or lesions     NECK: no adenopathy, no asymmetry, masses, or scars and thyroid normal to palpation     RESP: lungs clear to auscultation - no rales, rhonchi or wheezes     CV: regular rates and rhythm, normal S1 S2, no S3 or S4 and no murmur, click or rub     ABDOMEN:  soft, nontender, no HSM or masses and bowel sounds normal     MS: extremities normal- no gross deformities noted, no evidence of inflammation in joints, FROM in all extremities.     SKIN: no suspicious lesions or rashes     NEURO: Normal strength and tone, sensory exam grossly normal, mentation intact and speech normal     PSYCH: mentation appears normal. and affect normal/bright     LYMPHATICS: No cervical adenopathy    DIAGNOSTICS:     Labs Resulted Today:   Results for orders placed or performed in visit on 07/12/19   CBC with platelets   Result Value Ref Range    WBC 12.3 (H) 4.0 - 11.0 10e9/L    RBC Count 4.74 3.8 - 5.2 10e12/L    Hemoglobin 13.7 11.7 - 15.7 g/dL    Hematocrit 41.7 35.0 - 47.0 %    MCV 88 78 - 100 fl    MCH 28.9 26.5 - 33.0 pg    MCHC 32.9 31.5 - 36.5 g/dL    RDW 16.3 (H) 10.0 - 15.0 %    Platelet Count 284 150 - 450 10e9/L   Basic metabolic panel   Result Value Ref " Range    Sodium 140 133 - 144 mmol/L    Potassium 4.3 3.4 - 5.3 mmol/L    Chloride 104 94 - 109 mmol/L    Carbon Dioxide 29 20 - 32 mmol/L    Anion Gap 7 3 - 14 mmol/L    Glucose 72 70 - 99 mg/dL    Urea Nitrogen 7 7 - 30 mg/dL    Creatinine 0.90 0.52 - 1.04 mg/dL    GFR Estimate 75 >60 mL/min/[1.73_m2]    GFR Estimate If Black 87 >60 mL/min/[1.73_m2]    Calcium 8.9 8.5 - 10.1 mg/dL       Recent Labs   Lab Test 04/01/19  0930 07/21/14  0944 04/22/14  0759 12/05/12  0720   HGB 14.3 14.1  --  10.3*     --   --  352     --  141  --    POTASSIUM 3.7  --  4.3  --    CR 0.88  --  0.87  --         IMPRESSION:   Reason for surgery/procedure: melanoma excision on back   Diagnosis/reason for consult:  preoperative evaluation for assessment of cardiovascular and respiratory disease as well as overall risk assessment and perioperative medical management.    The proposed surgical procedure is considered LOW risk.    REVISED CARDIAC RISK INDEX  The patient has the following serious cardiovascular risks for perioperative complications such as (MI, PE, VFib and 3  AV Block):  No serious cardiac risks  INTERPRETATION: 0 risks: Class I (very low risk - 0.4% complication rate)    The patient has the following additional risks for perioperative complications:  No identified additional risks      ICD-10-CM    1. Preop general physical exam Z01.818        RECOMMENDATIONS:     --Consult hospital rounder / IM to assist post-op medical management    --Patient is to take all scheduled medications on the day of surgery EXCEPT for modifications listed below.    APPROVAL GIVEN to proceed with proposed procedure, without further diagnostic evaluation       Signed Electronically by: Ramona Ann Aaseby-Aguilera, PA-C    Copy of this evaluation report is provided to requesting physician.    [facility] Preop Guidelines    Revised Cardiac Risk Index

## 2019-07-13 LAB
ANION GAP SERPL CALCULATED.3IONS-SCNC: 7 MMOL/L (ref 3–14)
BUN SERPL-MCNC: 7 MG/DL (ref 7–30)
CALCIUM SERPL-MCNC: 8.9 MG/DL (ref 8.5–10.1)
CHLORIDE SERPL-SCNC: 104 MMOL/L (ref 94–109)
CO2 SERPL-SCNC: 29 MMOL/L (ref 20–32)
CREAT SERPL-MCNC: 0.9 MG/DL (ref 0.52–1.04)
GFR SERPL CREATININE-BSD FRML MDRD: 75 ML/MIN/{1.73_M2}
GLUCOSE SERPL-MCNC: 72 MG/DL (ref 70–99)
POTASSIUM SERPL-SCNC: 4.3 MMOL/L (ref 3.4–5.3)
SODIUM SERPL-SCNC: 140 MMOL/L (ref 133–144)

## 2019-07-17 ENCOUNTER — ANESTHESIA EVENT (OUTPATIENT)
Dept: SURGERY | Facility: AMBULATORY SURGERY CENTER | Age: 49
End: 2019-07-17

## 2019-07-18 ENCOUNTER — ANESTHESIA (OUTPATIENT)
Dept: SURGERY | Facility: AMBULATORY SURGERY CENTER | Age: 49
End: 2019-07-18

## 2019-07-18 ENCOUNTER — HOSPITAL ENCOUNTER (OUTPATIENT)
Facility: AMBULATORY SURGERY CENTER | Age: 49
End: 2019-07-18
Attending: SURGERY
Payer: COMMERCIAL

## 2019-07-18 ENCOUNTER — HOSPITAL ENCOUNTER (OUTPATIENT)
Dept: NUCLEAR MEDICINE | Facility: CLINIC | Age: 49
Setting detail: NUCLEAR MEDICINE
Discharge: HOME | End: 2019-07-18
Attending: SURGERY | Admitting: SURGERY
Payer: COMMERCIAL

## 2019-07-18 VITALS
RESPIRATION RATE: 17 BRPM | DIASTOLIC BLOOD PRESSURE: 85 MMHG | OXYGEN SATURATION: 95 % | HEART RATE: 112 BPM | TEMPERATURE: 98 F | SYSTOLIC BLOOD PRESSURE: 112 MMHG

## 2019-07-18 DIAGNOSIS — C43.59 MALIGNANT MELANOMA OF TORSO EXCLUDING BREAST (H): Primary | ICD-10-CM

## 2019-07-18 DIAGNOSIS — C43.59 MALIGNANT MELANOMA OF TORSO EXCLUDING BREAST (H): ICD-10-CM

## 2019-07-18 PROCEDURE — 34300033 ZZH RX 343: Performed by: SURGERY

## 2019-07-18 PROCEDURE — A9520 TC99 TILMANOCEPT DIAG 0.5MCI: HCPCS | Performed by: SURGERY

## 2019-07-18 PROCEDURE — 78195 LYMPH SYSTEM IMAGING: CPT

## 2019-07-18 RX ORDER — CEFAZOLIN SODIUM 2 G/50ML
2 SOLUTION INTRAVENOUS
Status: DISCONTINUED | OUTPATIENT
Start: 2019-07-18 | End: 2019-07-18 | Stop reason: HOSPADM

## 2019-07-18 RX ORDER — ONDANSETRON 4 MG/1
4 TABLET, ORALLY DISINTEGRATING ORAL EVERY 30 MIN PRN
Status: DISCONTINUED | OUTPATIENT
Start: 2019-07-18 | End: 2019-07-19 | Stop reason: HOSPADM

## 2019-07-18 RX ORDER — FENTANYL CITRATE 50 UG/ML
INJECTION, SOLUTION INTRAMUSCULAR; INTRAVENOUS PRN
Status: DISCONTINUED | OUTPATIENT
Start: 2019-07-18 | End: 2019-07-18

## 2019-07-18 RX ORDER — OXYCODONE HYDROCHLORIDE 5 MG/1
5 TABLET ORAL EVERY 4 HOURS PRN
Status: DISCONTINUED | OUTPATIENT
Start: 2019-07-18 | End: 2019-07-19 | Stop reason: HOSPADM

## 2019-07-18 RX ORDER — FENTANYL CITRATE 50 UG/ML
25-50 INJECTION, SOLUTION INTRAMUSCULAR; INTRAVENOUS
Status: DISCONTINUED | OUTPATIENT
Start: 2019-07-18 | End: 2019-07-19 | Stop reason: HOSPADM

## 2019-07-18 RX ORDER — MEPERIDINE HYDROCHLORIDE 25 MG/ML
12.5 INJECTION INTRAMUSCULAR; INTRAVENOUS; SUBCUTANEOUS
Status: DISCONTINUED | OUTPATIENT
Start: 2019-07-18 | End: 2019-07-19 | Stop reason: HOSPADM

## 2019-07-18 RX ORDER — NALOXONE HYDROCHLORIDE 0.4 MG/ML
.1-.4 INJECTION, SOLUTION INTRAMUSCULAR; INTRAVENOUS; SUBCUTANEOUS
Status: DISCONTINUED | OUTPATIENT
Start: 2019-07-18 | End: 2019-07-19 | Stop reason: HOSPADM

## 2019-07-18 RX ORDER — DEXAMETHASONE SODIUM PHOSPHATE 4 MG/ML
INJECTION, SOLUTION INTRA-ARTICULAR; INTRALESIONAL; INTRAMUSCULAR; INTRAVENOUS; SOFT TISSUE PRN
Status: DISCONTINUED | OUTPATIENT
Start: 2019-07-18 | End: 2019-07-18

## 2019-07-18 RX ORDER — ONDANSETRON 2 MG/ML
INJECTION INTRAMUSCULAR; INTRAVENOUS PRN
Status: DISCONTINUED | OUTPATIENT
Start: 2019-07-18 | End: 2019-07-18

## 2019-07-18 RX ORDER — ONDANSETRON 2 MG/ML
4 INJECTION INTRAMUSCULAR; INTRAVENOUS EVERY 30 MIN PRN
Status: DISCONTINUED | OUTPATIENT
Start: 2019-07-18 | End: 2019-07-19 | Stop reason: HOSPADM

## 2019-07-18 RX ORDER — CEFAZOLIN SODIUM 1 G/50ML
1 SOLUTION INTRAVENOUS SEE ADMIN INSTRUCTIONS
Status: DISCONTINUED | OUTPATIENT
Start: 2019-07-18 | End: 2019-07-18 | Stop reason: HOSPADM

## 2019-07-18 RX ORDER — PROPOFOL 10 MG/ML
INJECTION, EMULSION INTRAVENOUS CONTINUOUS PRN
Status: DISCONTINUED | OUTPATIENT
Start: 2019-07-18 | End: 2019-07-18

## 2019-07-18 RX ORDER — LIDOCAINE 40 MG/G
CREAM TOPICAL
Status: DISCONTINUED | OUTPATIENT
Start: 2019-07-18 | End: 2019-07-18 | Stop reason: HOSPADM

## 2019-07-18 RX ORDER — FENTANYL CITRATE 50 UG/ML
25-50 INJECTION, SOLUTION INTRAMUSCULAR; INTRAVENOUS
Status: DISCONTINUED | OUTPATIENT
Start: 2019-07-18 | End: 2019-07-18 | Stop reason: HOSPADM

## 2019-07-18 RX ORDER — SODIUM CHLORIDE, SODIUM LACTATE, POTASSIUM CHLORIDE, CALCIUM CHLORIDE 600; 310; 30; 20 MG/100ML; MG/100ML; MG/100ML; MG/100ML
INJECTION, SOLUTION INTRAVENOUS CONTINUOUS
Status: DISCONTINUED | OUTPATIENT
Start: 2019-07-18 | End: 2019-07-19 | Stop reason: HOSPADM

## 2019-07-18 RX ORDER — ISOSULFAN BLUE 50 MG/5ML
INJECTION, SOLUTION SUBCUTANEOUS PRN
Status: DISCONTINUED | OUTPATIENT
Start: 2019-07-18 | End: 2019-07-18 | Stop reason: HOSPADM

## 2019-07-18 RX ORDER — CEFAZOLIN SODIUM 2 G/50ML
2 SOLUTION INTRAVENOUS
Status: COMPLETED | OUTPATIENT
Start: 2019-07-18 | End: 2019-07-18

## 2019-07-18 RX ORDER — OXYCODONE HYDROCHLORIDE 5 MG/1
5-10 TABLET ORAL EVERY 4 HOURS PRN
Qty: 10 TABLET | Refills: 0 | Status: SHIPPED | OUTPATIENT
Start: 2019-07-18 | End: 2020-01-30

## 2019-07-18 RX ORDER — PROPOFOL 10 MG/ML
INJECTION, EMULSION INTRAVENOUS PRN
Status: DISCONTINUED | OUTPATIENT
Start: 2019-07-18 | End: 2019-07-18

## 2019-07-18 RX ORDER — GABAPENTIN 300 MG/1
300 CAPSULE ORAL ONCE
Status: COMPLETED | OUTPATIENT
Start: 2019-07-18 | End: 2019-07-18

## 2019-07-18 RX ORDER — OXYCODONE AND ACETAMINOPHEN 5; 325 MG/1; MG/1
1 TABLET ORAL
Status: DISCONTINUED | OUTPATIENT
Start: 2019-07-18 | End: 2019-07-19 | Stop reason: HOSPADM

## 2019-07-18 RX ORDER — ACETAMINOPHEN 325 MG/1
975 TABLET ORAL ONCE
Status: DISCONTINUED | OUTPATIENT
Start: 2019-07-18 | End: 2019-07-18 | Stop reason: HOSPADM

## 2019-07-18 RX ORDER — SODIUM CHLORIDE, SODIUM LACTATE, POTASSIUM CHLORIDE, CALCIUM CHLORIDE 600; 310; 30; 20 MG/100ML; MG/100ML; MG/100ML; MG/100ML
INJECTION, SOLUTION INTRAVENOUS CONTINUOUS
Status: DISCONTINUED | OUTPATIENT
Start: 2019-07-18 | End: 2019-07-18 | Stop reason: HOSPADM

## 2019-07-18 RX ORDER — ACETAMINOPHEN 325 MG/1
975 TABLET ORAL ONCE
Status: COMPLETED | OUTPATIENT
Start: 2019-07-18 | End: 2019-07-18

## 2019-07-18 RX ADMIN — FENTANYL CITRATE 50 MCG: 50 INJECTION, SOLUTION INTRAMUSCULAR; INTRAVENOUS at 14:11

## 2019-07-18 RX ADMIN — Medication 50 MG: at 13:50

## 2019-07-18 RX ADMIN — ACETAMINOPHEN 975 MG: 325 TABLET ORAL at 12:13

## 2019-07-18 RX ADMIN — PROPOFOL 150 MCG/KG/MIN: 10 INJECTION, EMULSION INTRAVENOUS at 13:49

## 2019-07-18 RX ADMIN — ONDANSETRON 4 MG: 2 INJECTION INTRAMUSCULAR; INTRAVENOUS at 13:54

## 2019-07-18 RX ADMIN — SODIUM CHLORIDE, SODIUM LACTATE, POTASSIUM CHLORIDE, CALCIUM CHLORIDE: 600; 310; 30; 20 INJECTION, SOLUTION INTRAVENOUS at 12:12

## 2019-07-18 RX ADMIN — Medication 0.3 MG: at 15:37

## 2019-07-18 RX ADMIN — CEFAZOLIN SODIUM 2 G: 2 SOLUTION INTRAVENOUS at 14:03

## 2019-07-18 RX ADMIN — TILMANOCEPT 0.5 MCI.: KIT at 10:07

## 2019-07-18 RX ADMIN — GABAPENTIN 300 MG: 300 CAPSULE ORAL at 12:13

## 2019-07-18 RX ADMIN — PROPOFOL 180 MG: 10 INJECTION, EMULSION INTRAVENOUS at 13:49

## 2019-07-18 RX ADMIN — FENTANYL CITRATE 50 MCG: 50 INJECTION, SOLUTION INTRAMUSCULAR; INTRAVENOUS at 13:49

## 2019-07-18 RX ADMIN — PROPOFOL: 10 INJECTION, EMULSION INTRAVENOUS at 15:01

## 2019-07-18 RX ADMIN — PROPOFOL: 10 INJECTION, EMULSION INTRAVENOUS at 14:23

## 2019-07-18 RX ADMIN — CEFAZOLIN SODIUM 1 G: 2 SOLUTION INTRAVENOUS at 16:00

## 2019-07-18 RX ADMIN — Medication 0.2 MG: at 16:05

## 2019-07-18 RX ADMIN — DEXAMETHASONE SODIUM PHOSPHATE 4 MG: 4 INJECTION, SOLUTION INTRA-ARTICULAR; INTRALESIONAL; INTRAMUSCULAR; INTRAVENOUS; SOFT TISSUE at 13:54

## 2019-07-18 ASSESSMENT — LIFESTYLE VARIABLES: TOBACCO_USE: 0

## 2019-07-18 NOTE — ANESTHESIA POSTPROCEDURE EVALUATION
Anesthesia POST Procedure Evaluation    Patient: Nancy Bronson   MRN:     [id number] Gender:   female   Age:    49 year old :      1970        Preoperative Diagnosis: Melanoma   Procedure(s):  Wide Local Excision of Back Melanoma  [place] Lymph Node Mapping and Biopsy  Back Reconstruction With Local Tissue Rearrangement   Postop Comments: No value filed.       Anesthesia Type:  General  No value filed.    Reportable Event: NO     PAIN: Uncomplicated   Sign Out status: Comfortable, Well controlled pain     PONV: No PONV   Sign Out status:  No Nausea or Vomiting     Neuro/Psych: Uneventful perioperative course   Sign Out Status: Preoperative baseline; Age appropriate mentation     Airway/Resp.: Uneventful perioperative course   Sign Out Status: Non labored breathing, age appropriate RR; Resp. Status within EXPECTED Parameters     CV: Uneventful perioperative course   Sign Out status: Appropriate BP and perfusion indices; Appropriate HR/Rhythm     Disposition:   Sign Out in:  PACU  Disposition:  Phase II; Home  Recovery Course: Uneventful  Follow-Up: Not required           Last Anesthesia Record Vitals:  CRNA VITALS  2019 1616 - 2019 1704      2019             Pulse:  117    SpO2:  100 %    Resp Rate (observed):  21          Last PACU Vitals:  Vitals Value Taken Time   /86 2019  4:49 PM   Temp 36.4  C (97.5  F) 2019  4:49 PM   Pulse 122 2019  4:49 PM   Resp 18 2019  4:59 PM   SpO2 95 % 2019  4:59 PM   Temp src     NIBP     Pulse     SpO2     Resp     Temp     Ht Rate     Temp 2     Vitals shown include unvalidated device data.      Electronically Signed By: Eugene Andersen DO, 2019, 5:04 PM

## 2019-07-18 NOTE — BRIEF OP NOTE
[facility]    Brief Operative Note    Pre-operative diagnosis: Melanoma  Post-operative diagnosis * No post-op diagnosis entered *  Procedure: Procedure(s):  Wide Local Excision of Back Melanoma  [place] Lymph Node Mapping and Biopsy  Back Reconstruction With Local Tissue Rearrangement  Surgeon: Surgeon(s) and Role:  Panel 1:     * Susan Raman MD - Primary  Panel 2:     * DIPAK Ybarra MD - Primary  Anesthesia: General   Estimated blood loss: Less than 50 ml  Drains: None  Specimens:   ID Type Source Tests Collected by Time Destination   A : left axillary sentinel lymph node #1 Tissue Lymph Node, [place] SURGICAL PATHOLOGY EXAM Susan Raman MD 7/18/2019  2:36 PM    B : left upper back skin lesion; short superior 12 o'clock, long lateral 9 o'clock Tissue Back SURGICAL PATHOLOGY EXAM Susan Raman MD 7/18/2019  3:36 PM      Findings:   None.  Complications: None.  Implants:  * No implants in log *       Keystone flap for coverage

## 2019-07-18 NOTE — ANESTHESIA CARE TRANSFER NOTE
Patient: Nancy Bronson    Procedure(s):  Wide Local Excision of Back Melanoma  [place] Lymph Node Mapping and Biopsy  Back Reconstruction With Local Tissue Rearrangement    Diagnosis: Melanoma  Diagnosis Additional Information: No value filed.    Anesthesia Type:   No value filed.     Note:  Airway :Face Mask  Patient transferred to:PACU  Handoff Report: Identifed the Patient, Identified the Reponsible Provider, Reviewed the pertinent medical history, Discussed the surgical course, Reviewed Intra-OP anesthesia mangement and issues during anesthesia, Set expectations for post-procedure period and Allowed opportunity for questions and acknowledgement of understanding      Vitals: (Last set prior to Anesthesia Care Transfer)    CRNA VITALS  7/18/2019 1616 - 7/18/2019 1651      7/18/2019             Pulse:  117    SpO2:  100 %    Resp Rate (observed):  21                Electronically Signed By: BEVERLEY Burgess CRNA  July 18, 2019  4:51 PM

## 2019-07-18 NOTE — DISCHARGE INSTRUCTIONS
[facility]  Home Care Following Anesthesia  For 24 hours after surgery:  1. Get plenty of rest.  A responsible adult must stay with you for at least 24 hours after you leave the surgery center.  2. Do not drive or use heavy equipment.  If you have weakness or tingling, don't drive or use heavy equipment until this feeling goes away.   3. Do not drink alcohol.   4. Avoid strenuous or risky activities.  Ask for help when climbing stairs.  5. You may feel lightheaded.  IF so, sit for a few minutes before standing.  Have someone help you get up.   6. If you have nausea (feel sick to your stomach): Drink only clear liquids such as apple juice, ginger ale, broth or 7-Up.  Rest may also help.  Be sure to drink enough fluids.  Move to a regular diet as you feel able.   7. You may have a slight fever.  Call the doctor if your fever is over 100 F (37.7 C) (taken under the tongue) or lasts longer than 24 hours.  8. You may have a dry mouth, a sore throat, muscle aches or trouble sleeping. These should go away after 24 hours.  9. Do not make important or legal decisions.               Tips for taking pain medications  To get the best pain relief possible, remember these points:    Take pain medications as directed, before pain becomes severe.    Pain medication can upset your stomach: taking it with food may help.    Constipation is a common side effect of pain medication. Drink plenty of  fluids.    Eat foods high in fiber. Take a stool softener if recommended by your doctor or pharmacist.    Do not drink alcohol, drive or operate machinery while taking pain medications.    Ask about other ways to control pain, such as with heat, ice or relaxation.    Tylenol/Acetaminophen Consumption  To help encourage the safe use of acetaminophen, the makers of TYLENOL  have lowered the maximum daily dose for single-ingredient Extra Strength TYLENOL  (acetaminophen) products sold in the [place] from 8  pills per day (4,000 mg) to 6 pills per day (3,000 mg). The dosing interval has also changed from 2 pills every 4-6 hours to 2 pills every 6 hours.    If you feel your pain relief is insufficient, you may take Tylenol/Acetaminophen in addition to your narcotic pain medication.     Be careful not to exceed 3,000 mg of Tylenol/Acetaminophen in a 24 hour period from all sources.    If you are taking extra strength Tylenol/acetaminophen (500 mg), the maximum dose is 6 tablets in 24 hours.    If you are taking regular strength acetaminophen (325 mg), the maximum dose is 9 tablets in 24 hours.    Call a doctor for any of the followin. Signs of infection (fever, growing tenderness at the surgery site, a large amount of drainage or bleeding, severe pain, foul-smelling drainage, redness, swelling).  2. It has been over 8 to 10 hours since surgery and you are still not able to urinate (pass water).  3. Headache for over 24 hours.  4. Numbness, tingling or weakness the day after surgery (if you had spinal anesthesia).  Your doctor is:  Dr. Susan Raman, Breast Center: [phone]                    Or dial [phone] and ask for the resident on call for:  Plastics  For emergency care, call the:  [facility]:  [phone] (TTY for hearing impaired: [phone])

## 2019-07-18 NOTE — ANESTHESIA PREPROCEDURE EVALUATION
Anesthesia Pre-Procedure Evaluation    Patient: Nancy Bronson   MRN:     [id number] Gender:   female   Age:    49 year old :      1970        Preoperative Diagnosis: Melanoma   Procedure(s):  Wide Local Excision of Back Melanoma  [place] Lymph Node Mapping and Biopsy  Back Reconstruction With Local Tissue Rearrangement     Past Medical History:   Diagnosis Date     Anemia      NO ACTIVE PROBLEMS       Past Surgical History:   Procedure Laterality Date     ABDOMEN SURGERY  2012    hysterectomy     C C-SEC ONLY,PREV C-SEC  ,    2     C NONSPECIFIC PROCEDURE      tonsils     C NONSPECIFIC PROCEDURE      jaw surgery     DAVINCI HYSTERECTOMY SUPRACERVICAL  2012    benign pathProcedure: DAVINCI HYSTERECTOMY SUPRACERVICAL;  Davinci Supracervical Hysterectomy with Bilateral Salpingectomy, Cystoscopy ;  Surgeon: Selene Cardenas DO;  Location: [facility] OR     ENT SURGERY       HEAD & NECK SURGERY      jaw surgery     LAPAROSCOPIC TUBAL LIGATION            Anesthesia Evaluation     . Pt has had prior anesthetic.     No history of anesthetic complications          ROS/MED HX    ENT/Pulmonary:  - neg pulmonary ROS    (-) tobacco use   Neurologic:  - neg neurologic ROS     Cardiovascular:  - neg cardiovascular ROS   (+) ----. : . . . :. . Previous cardiac testing date:results:date: results:ECG reviewed date: results:ST date: results:          METS/Exercise Tolerance:  >4 METS   Hematologic:     (+) Anemia, -      Musculoskeletal:  - neg musculoskeletal ROS       GI/Hepatic:  - neg GI/hepatic ROS       Renal/Genitourinary:  - ROS Renal section negative       Endo:  - neg endo ROS       Psychiatric:  - neg psychiatric ROS       Infectious Disease:  - neg infectious disease ROS       Malignancy:   (+) Malignancy History of Skin          Other:    - neg other ROS                     PHYSICAL EXAM:   Mental Status/Neuro: A/A/O   Airway: Facies: Feasible  Mallampati: I  Mouth/Opening: Full  TM  "distance: > 6 cm  Neck ROM: Full   Respiratory: Auscultation: CTAB     Resp. Rate: Normal     Resp. Effort: Normal      CV: Rhythm: Regular  Rate: Age appropriate  Heart: Normal Sounds   Comments:      Dental: Normal                  Lab Results   Component Value Date    WBC 12.3 (H) 07/12/2019    HGB 13.7 07/12/2019    HCT 41.7 07/12/2019     07/12/2019     07/12/2019    POTASSIUM 4.3 07/12/2019    CHLORIDE 104 07/12/2019    CO2 29 07/12/2019    BUN 7 07/12/2019    CR 0.90 07/12/2019    GLC 72 07/12/2019    ALEXANDER 8.9 07/12/2019    ALBUMIN 3.4 04/01/2019    PROTTOTAL 7.4 04/01/2019    ALT 19 04/01/2019    AST 12 04/01/2019    ALKPHOS 99 04/01/2019    BILITOTAL 0.4 04/01/2019    TSH 2.71 04/01/2019    HCG Negative 12/04/2012       Preop Vitals  BP Readings from Last 3 Encounters:   07/18/19 122/90   07/12/19 120/70   07/02/19 (!) 134/95    Pulse Readings from Last 3 Encounters:   07/18/19 91   07/12/19 94   07/02/19 101      Resp Readings from Last 3 Encounters:   07/12/19 16   07/02/19 16   06/24/19 16    SpO2 Readings from Last 3 Encounters:   07/18/19 96%   07/12/19 96%   07/02/19 96%      Temp Readings from Last 1 Encounters:   07/18/19 36.7  C (98.1  F) (Oral)    Ht Readings from Last 1 Encounters:   07/12/19 1.676 m (5' 6\")      Wt Readings from Last 1 Encounters:   07/12/19 98.5 kg (217 lb 3.2 oz)    Estimated body mass index is 35.06 kg/m  as calculated from the following:    Height as of 7/12/19: 1.676 m (5' 6\").    Weight as of 7/12/19: 98.5 kg (217 lb 3.2 oz).     LDA:  Peripheral IV 07/18/19 Left Wrist (Active)   Site Assessment WDL 7/18/2019 12:30 PM   Line Status Infusing 7/18/2019 12:30 PM   Phlebitis Scale 0-->no symptoms 7/18/2019 12:30 PM   Infiltration Scale 0 7/18/2019 12:30 PM   Dressing Intervention New dressing  7/18/2019 12:30 PM   Number of days: 0            Assessment:   ASA SCORE: 2    NPO Status: > 6 hours since completed Solid Foods   Documentation: H&P complete; Preop Testing " complete; Consents complete   Proceeding: Proceed without further delay  Tobacco Use:  NO Active use of Tobacco/UNKNOWN Tobacco use status     Plan:   Anes. Type:  General   Pre-Induction: Midazolam IV; Acetaminophen PO; Gabapentin PO   Induction:  IV (Standard); Propofol   Airway: LMA   Access/Monitoring: PIV   Maintenance: Balanced; Propofol   Emergence: Procedure Site   Logistics: Same Day Surgery     Postop Pain/Sedation Strategy:  Standard-Options: Opioids PRN; LA by Surgeon     PONV Management:  Adult Risk Factors: Female, Non-Smoker, Postop Opioids  Prevention: Ondansetron; Dexamethasone     CONSENT: Direct conversation   Plan and risks discussed with: Patient   Blood Products: Consent Deferred (Minimal Blood Loss)                         Eugene Andersen DO

## 2019-07-18 NOTE — OP NOTE
DATE OF SURGERY: July 18, 2019     SURGEON: Susan Raman MD  ASSISTANT: Gaby Townsend MD PGY-7    PREOPERATIVE DIAGNOSIS:   1. Left upper back melanoma  2. Left upper back sebaceous cyst    POSTOPERATIVE DIAGNOSIS: same    PROCEDURE:   1. Left axillary sentinel lymph node mapping and biopsy  2. Wide local excision of left upper back melanoma with 2 cm margins, with excision of left upper back sebaceous cyst  3. Complex wound closure by Dr Ybarra    ANESTHESIA: General    CLINICAL NOTE:  Nancy Bronson is a 49 year old woman with an at least 3.5 mm thick melanoma of the left upper back.  There was also a sebaceous cyst inferior to the melanoma.  The risks and benefits of sentinel lymph node mapping and biopsy, and wide local excision were discussed with the patient and she elected to proceed with informed consent.    OPERATIVE FINDINGS:  1. One left axillary sentinel lymph node removed    OPERATIVE PROCEDURE:  The patient first presented to the Nuclear Medicine department for desi-tumoral injection of technetium-labelled tilmanocept and lymphoscintigraphy.  The lymphoscintigraphy images were personally reviewed by me prior to the start of the procedure. The tumor site was verified with the patient by me personally.  The patient was brought to the operating room and placed in the supine position with appropriate padding for all of the pressure points. A general anesthetic was administered.   A surgical safety checklist was performed to confirm the patient's identity, the site and laterality of the procedure. Perioperative antibiotics (Ancef) was provided.  VTE prophylaxis was provided with serial compression devices. 0.5 mL of lymphazurin was injected into the four quadrants around the previous melanoma biopsy site on the left upper back.   Preoperative lymphoscintigraphy indicated the sentinel casimiro basin(s) is in the left axillary region.  The left axilla was then prepped and draped in the usual sterile fashion using  Chloraprep.     We began with the axilla.  An incision was made just below the hair-bearing area of the left axilla.  The clavipectoral fascia was incised to enter the axilla. The Neoprobe was used to identify the sentinel lymph nodes.  [place] lymph node #1 was not blue and had an ex vivo count of 5557. The background count was 98. No other blue or palpable lymph nodes were found.  Thus no additional sentinel lymph nodes were sought.  All of the lymph nodes were sent to pathology individually.  The wound was irrigated and hemostasis was achieved.  The incision was closed in two layers with interrupted 3-0 vicryl and a running subcuticular 4-0 monocryl.    Next, the patient was repositioned prone and the back was prepped and draped in the usual sterile fashion using Chloraprep.  2 cm margins were marked out circumferentially around the tumor site (where the biopsy previously took place) on the left upper back.  The tumor site itself measured 10 mm x 11 mm.  Of note, there was a sebaceous cyst located 2 cm inferior to the melanoma.  Because of concerns of wound infection if the sebaceous cyst is ruptured during surgery or afterwards, it was marked to be included in the specimen.  The skin incision was made.  The dissection was carried out down to the underlying fascia.  The specimen was then marked using a marking suture; short for superior (12:00), and long for lateral (9:00).  The specimen measured 4.3 cm x 5.8 cm.  The specimen was then sent to pathology.  The cavity at this point measured 7 cm x 7 cm.  The wound was irrigated and hemostasis was achieved. The patient tolerated the procedure well thus far. The sponge, needle, instrument counts were correct thus far.  At this point, Dr Ybarra and his team took over for the complex wound closure portion of the case, which will be dictated separately.       I was present and scrubbed for the entire above procedure.    EBL: 5 mL    SPECIMEN(S):  A. Left axillary  sentinel lymph node # 1  B. Left upper back skin; short suture = superior (12:00), long suture = lateral (9:00)    POSTOPERATIVE PLANS:  Nancy QUESADA Arletholegario will be discharged home today with wound care instructions and a prescription for analgesics.  She will follow-up with me in 2 weeks.     Susan Raman MD MSc FRC FACS    Division of Surgical Oncology  [facility]

## 2019-07-19 ENCOUNTER — TELEPHONE (OUTPATIENT)
Dept: SURGERY | Facility: CLINIC | Age: 49
End: 2019-07-19

## 2019-07-19 NOTE — TELEPHONE ENCOUNTER
POST-OP CALL  Jul 19, 2019    Nancy Bronson is a 49 year old female s/p WLE of upper back melanoma.     Reports doing well. No questions or concerns.     Fevers/chills: Patient denies fever/chills.  Eating/drinking: Patient is able to eat and drink without any complaints.   Bowel habits: Patient reports having a normal bowel movement.  Urine output: Voiding without difficulty.   Incisions: Patient denies any signs and symptoms of infection. No erythema, swelling or drainage  Pain: Patient reports pain is controlled  Patient will call with any questions or concerns.    Bekah Crawford PA-C

## 2019-07-19 NOTE — OP NOTE
Procedure Date: 07/18/2019      PREOPERATIVE DIAGNOSIS:  Malignant melanoma excision from the left upper mid back region with defect measuring about 7 x7 cm down to underlying muscle.      POSTOPERATIVE DIAGNOSIS:  Malignant melanoma excision from the left upper mid back region with defect measuring about 7 x7 cm down to underlying muscle.      PLASTIC SURGERY PROCEDURE:  Keystone flap ( based fasciocutaneous flap of the back to close 7 x 7 cm defect).      SURGEON:  ISAAC Ybarra MD      RESIDENT:  Helga Paz MD      ANESTHESIA:  General anesthesia with endotracheal intubation.      COMPLICATIONS:  Nil.      DRAINS:  Nil.      SPECIMENS:  Nil.      BLOOD LOSS:  15 mL.        DESCRIPTION OF PROCEDURE:  After informed consent was obtained from the patient, the proper site and procedure was ascertained with her and she was appropriately marked, she was taken to the procedure room.  Surgical Oncology began the case and carried out their portion.  They will dictate their portion.  I was called to the operating room once they were done with everything.  She was in a prone position with her arms in a superman position.  She had a 7 x 7 rounded defect in the left upper back region.  Primary closure could not be attained.  Went ahead and marked out a [place] flap with dimensions of 7 cm.  I dopplered out 3 good perforators in the center of the flap.  I then made my incision, dissected using the electrocautery through the subcutaneous tissues down to the underlying fascia of the muscles.  I cut through the fascia of the muscles.  The Keystone flap was then elevated off of the muscle, elevating the fascia in a subfascial manner such that this flap with a fasciocutaneous flap up to the underlying 3 perforators.  At this point, the flap was advanced to close the defect.  0 PDS suture in an interrupted deep fashion was used to close the [place] flap and followed by 2-0 nylon suture in an interrupted  horizontal  mattress fashion followed by surgical glue and tape.  The patient tolerated the procedure well.  All counts correct at the end of the case.  The patient was extubated and sent to recovery room in stable condition.         DIPAK TRUONG MD             D: 2019   T: 2019   MT: flakita      Name:     ABRAHAM CEDENO   MRN:      [id number]        Account:        [id number]   :      1970           Procedure Date: 2019      Document: [id number]

## 2019-07-25 LAB — COPATH REPORT: NORMAL

## 2019-07-30 ENCOUNTER — OFFICE VISIT (OUTPATIENT)
Dept: PLASTIC SURGERY | Facility: CLINIC | Age: 49
End: 2019-07-30
Attending: PLASTIC SURGERY
Payer: COMMERCIAL

## 2019-07-30 ENCOUNTER — OFFICE VISIT (OUTPATIENT)
Dept: ONCOLOGY | Facility: CLINIC | Age: 49
End: 2019-07-30
Attending: SURGERY
Payer: COMMERCIAL

## 2019-07-30 VITALS
TEMPERATURE: 97.4 F | RESPIRATION RATE: 16 BRPM | BODY MASS INDEX: 34.58 KG/M2 | HEART RATE: 101 BPM | HEIGHT: 66 IN | SYSTOLIC BLOOD PRESSURE: 112 MMHG | DIASTOLIC BLOOD PRESSURE: 84 MMHG | OXYGEN SATURATION: 96 % | WEIGHT: 215.2 LBS

## 2019-07-30 VITALS
OXYGEN SATURATION: 96 % | RESPIRATION RATE: 16 BRPM | BODY MASS INDEX: 34.58 KG/M2 | HEIGHT: 66 IN | TEMPERATURE: 97.4 F | DIASTOLIC BLOOD PRESSURE: 84 MMHG | WEIGHT: 215.2 LBS | SYSTOLIC BLOOD PRESSURE: 112 MMHG | HEART RATE: 101 BPM

## 2019-07-30 DIAGNOSIS — Z98.890 S/P FLAP GRAFT: Primary | ICD-10-CM

## 2019-07-30 DIAGNOSIS — C43.59 MALIGNANT MELANOMA OF TORSO EXCLUDING BREAST (H): Primary | ICD-10-CM

## 2019-07-30 PROCEDURE — G0463 HOSPITAL OUTPT CLINIC VISIT: HCPCS | Mod: ZF,27

## 2019-07-30 PROCEDURE — G0463 HOSPITAL OUTPT CLINIC VISIT: HCPCS | Mod: ZF

## 2019-07-30 ASSESSMENT — MIFFLIN-ST. JEOR
SCORE: 1617.89
SCORE: 1617.89

## 2019-07-30 ASSESSMENT — PAIN SCALES - GENERAL
PAINLEVEL: NO PAIN (0)
PAINLEVEL: NO PAIN (0)

## 2019-07-30 NOTE — LETTER
"2019      RE: Nancy Bronson  [address] [facility] [address]     2019    Selene Cardenas, DO   [address] [facility] [address]    RE: Nancy Bronson  (: 1970)    Dear Dr. Selene Cardenas    Your patient was seen for evaluation in my office.  Please find a copy of my notes for your record and review.  If you have any further questions, please feel free to contact my office.   Thank you for your kind referral.    Sincerely,   Susan Raman MD MSc [facility] FACS    ---   FOLLOW-UP  2019    Nancy Bronson is a 49 year old female who returns for her 1st post-operative follow-up visit.    Cancer Staging  Malignant melanoma of torso excluding breast (H)  Staging form: Melanoma of the Skin, AJCC 8th Edition  - Clinical: Stage IIB (cT3b, cN0, cM0) - Unsigned    Treatment to date:  1. Wide local excision of left back melanoma, left axillary sentinel lymph node biopsy (2019)    HPI:    She underwent a wide local excision of left back melanoma and left axillary SLNB on 2019.  She is currently 2 week(s) post-op.  Final surgical pathology showed no residual melanoma and a negative node..    Since the procedure, she has been doing well.  She denies any redness or swelling, or drainage from the incision, or fever/chills.  She has good range of motion of her shoulders bilaterally and denies any upper extremity swelling.      She reports some concerns regarding her postoperative discharge process in the recovery room and felt she was \"rushed out\".  I addressed her questions regarding postoperative recovery today and also provided the number to call for Patient Relations for her to provide feedback.    /84   Pulse 101   Temp 97.4  F (36.3  C) (Oral)   Resp 16   Ht 1.676 m (5' 6\")   Wt 97.6 kg (215 lb 3.2 oz)   LMP 2012   SpO2 96%   BMI 34.73 kg/m      Physical Exam   Constitutional: She appears well-developed and well-nourished.   Pulmonary/Chest: No " respiratory distress.   Lymphadenopathy:   Left axillary incision healing well without seroma, hematoma, or cellulitis.    Skin: Skin is warm and dry.            INVESTIGATIONS:    Surgical Pathology (7/18/2019):  FINAL DIAGNOSIS:   A. Lymph node, left axillary, sentinel #1:   - One sentinel lymph node, negative for metastatic melanoma (0 of 1 lymph node)  (see description)   B. Skin, left upper back, wide local excision:   - Scar from the prior surgical procedure, with no evidence of residual lesion    ASSESSMENT:    Nancy Bronson is a 49 year old female with melanoma, s/p surgical excision.    Her stage is:  Cancer Staging  Malignant melanoma of torso excluding breast (H)  Staging form: Melanoma of the Skin, AJCC 8th Edition  - Clinical: Stage IIB (cT3b, cN0, cM0) - Unsigned  - Pathologic: Stage IIB (pT3b, pN0, cM0) - Signed by Susan Raman MD on 7/30/2019     She is healing well. She may return to work. She should continue to avoid heavy lifting.    We reviewed the pathology today and a copy of the report was provided. No further surgery or treatment is indicated. She has yet to undergo a full skin evaluation; this has been scheduled for September 2019.    We reviewed that surveillance involves a history and physical exam every 6 months for the first 5 years, then annually, with imaging studies only indicated if symptoms arise. We reviewed the signs and symptoms that could arise in the setting of recurrent locoregional or metastatic disease. In addition, she will need to undergo quarterly dermatologic full skin survey and evaluation given that patients with a diagnosis of melanoma are at risk of recurrence (local and distant) and of subsequent de sherrill melanoma.  I also reviewed the importance of protection from sun exposure, including wearing long sleeves, hats, etc and also the use of sunscreen with SPF of at least 35, with frequent re-applications.    All of the above was discussed with the patient  and all questions were answered.     PLAN:  1. Follow up with me in 6 months  2. Dermatology evaluation, schedueld for September 2019    Susan Raman MD MSc [facility]SC FACS    Division of Surgical Oncology  [facility]

## 2019-07-30 NOTE — LETTER
7/30/2019       RE: Nancy Bronson  [address] [facility] [address]     Dear Colleague,    Thank you for referring your patient, Nancy Bronson, to the [facility] at [facility]. Please see a copy of my visit note below.    POSTOPERATIVE VISIT NOTE      PRESENTING COMPLAINT:  Postoperative visit status post malignant melanoma excision from the back with a Keystone flap reconstruction done 07/18/2019.      HISTORY OF PRESENTING COMPLAINT:  Ms. Bronson is 49 years old.  She is about 10 days out from surgery.  Done well.  No major issues.  Minimal pain.      PHYSICAL EXAMINATION:     VITALS SIGNS:  Stable.  She is afebrile, in no obvious distress.     SKIN:  The flap is healing in well.  No evidence for infection, seroma or hematoma.      ASSESSMENT AND PLAN:  Based on above findings, a diagnosis of back reconstruction with a local flap was made.  Plan now is to allow everything to heal, take out the sutures in 2 more weeks.  She should moisturize the area.  All questions were answered.  She was happy with the visit.         Again, thank you for allowing me to participate in the care of your patient.      Sincerely,    DIPAK Ybarra MD

## 2019-07-30 NOTE — PROGRESS NOTES
"FOLLOW-UP  Jul 30, 2019    Nancy Bronson is a 49 year old female who returns for her 1st post-operative follow-up visit.    Cancer Staging  Malignant melanoma of torso excluding breast (H)  Staging form: Melanoma of the Skin, AJCC 8th Edition  - Clinical: Stage IIB (cT3b, cN0, cM0) - Unsigned    Treatment to date:  1. Wide local excision of left back melanoma, left axillary sentinel lymph node biopsy (7/18/2019)    HPI:    She underwent a wide local excision of left back melanoma and left axillary SLNB on 7/18/2019.  She is currently 2 week(s) post-op.  Final surgical pathology showed no residual melanoma and a negative node..    Since the procedure, she has been doing well.  She denies any redness or swelling, or drainage from the incision, or fever/chills.  She has good range of motion of her shoulders bilaterally and denies any upper extremity swelling.      She reports some concerns regarding her postoperative discharge process in the recovery room and felt she was \"rushed out\".  I addressed her questions regarding postoperative recovery today and also provided the number to call for Patient Relations for her to provide feedback.    /84   Pulse 101   Temp 97.4  F (36.3  C) (Oral)   Resp 16   Ht 1.676 m (5' 6\")   Wt 97.6 kg (215 lb 3.2 oz)   LMP 12/02/2012   SpO2 96%   BMI 34.73 kg/m     Physical Exam   Constitutional: She appears well-developed and well-nourished.   Pulmonary/Chest: No respiratory distress.   Lymphadenopathy:   Left axillary incision healing well without seroma, hematoma, or cellulitis.    Skin: Skin is warm and dry.            INVESTIGATIONS:    Surgical Pathology (7/18/2019):  FINAL DIAGNOSIS:   A. Lymph node, left axillary, sentinel #1:   - One sentinel lymph node, negative for metastatic melanoma (0 of 1 lymph node)  (see description)   B. Skin, left upper back, wide local excision:   - Scar from the prior surgical procedure, with no evidence of residual " lesion    ASSESSMENT:    Nancy Bronson is a 49 year old female with melanoma, s/p surgical excision.    Her stage is:  Cancer Staging  Malignant melanoma of torso excluding breast (H)  Staging form: Melanoma of the Skin, AJCC 8th Edition  - Clinical: Stage IIB (cT3b, cN0, cM0) - Unsigned  - Pathologic: Stage IIB (pT3b, pN0, cM0) - Signed by Susan Raman MD on 7/30/2019     She is healing well. She may return to work. She should continue to avoid heavy lifting.    We reviewed the pathology today and a copy of the report was provided. No further surgery or treatment is indicated. She has yet to undergo a full skin evaluation; this has been scheduled for September 2019.    We reviewed that surveillance involves a history and physical exam every 6 months for the first 5 years, then annually, with imaging studies only indicated if symptoms arise. We reviewed the signs and symptoms that could arise in the setting of recurrent locoregional or metastatic disease. In addition, she will need to undergo quarterly dermatologic full skin survey and evaluation given that patients with a diagnosis of melanoma are at risk of recurrence (local and distant) and of subsequent de sherrill melanoma.  I also reviewed the importance of protection from sun exposure, including wearing long sleeves, hats, etc and also the use of sunscreen with SPF of at least 35, with frequent re-applications.    All of the above was discussed with the patient and all questions were answered.     PLAN:  1. Follow up with me in 6 months  2. Dermatology evaluation, schedueld for September 2019    Susan Raman MD MSc [facility] FACS    Division of Surgical Oncology  [facility]

## 2019-07-30 NOTE — PROGRESS NOTES
POSTOPERATIVE VISIT NOTE      PRESENTING COMPLAINT:  Postoperative visit status post malignant melanoma excision from the back with a Keystone flap reconstruction done 07/18/2019.      HISTORY OF PRESENTING COMPLAINT:  Ms. Bronson is 49 years old.  She is about 10 days out from surgery.  Done well.  No major issues.  Minimal pain.      PHYSICAL EXAMINATION:     VITALS SIGNS:  Stable.  She is afebrile, in no obvious distress.     SKIN:  The flap is healing in well.  No evidence for infection, seroma or hematoma.      ASSESSMENT AND PLAN:  Based on above findings, a diagnosis of back reconstruction with a local flap was made.  Plan now is to allow everything to heal, take out the sutures in 2 more weeks.  She should moisturize the area.  All questions were answered.  She was happy with the visit.

## 2019-07-30 NOTE — NURSING NOTE
"Oncology Rooming Note    July 30, 2019 12:41 PM   Nancy Bronson is a 49 year old female who presents for:    Chief Complaint   Patient presents with     Oncology Clinic Visit     RETURN VISIT; POST OP RECONSTRUCTION AFTER MELANOMA EXCISION; VITALS COMPLETED BY [facility]      Initial Vitals: /84   Pulse 101   Temp 97.4  F (36.3  C) (Oral)   Resp 16   Ht 1.676 m (5' 6\")   Wt 97.6 kg (215 lb 3.2 oz)   LMP 12/02/2012   SpO2 96%   BMI 34.73 kg/m   Estimated body mass index is 34.73 kg/m  as calculated from the following:    Height as of this encounter: 1.676 m (5' 6\").    Weight as of this encounter: 97.6 kg (215 lb 3.2 oz). Body surface area is 2.13 meters squared.  No Pain (0) Comment: Data Unavailable   Patient's last menstrual period was 12/02/2012.  Allergies reviewed: Yes  Medications reviewed: Yes    Medications: Medication refills not needed today.  Pharmacy name entered into [facility]:    [facility] [place], [place] - [address]  [id number] - [place], [place] - [address]  ABDIRAHMAN MENESES    Clinical concerns: No new concerns today  Dr Raman was notified.      Liz Max              "

## 2019-08-14 ENCOUNTER — OFFICE VISIT (OUTPATIENT)
Dept: PLASTIC SURGERY | Facility: CLINIC | Age: 49
End: 2019-08-14
Payer: COMMERCIAL

## 2019-08-14 DIAGNOSIS — Z98.890 S/P FLAP GRAFT: Primary | ICD-10-CM

## 2019-08-14 ASSESSMENT — PAIN SCALES - GENERAL: PAINLEVEL: NO PAIN (0)

## 2019-08-14 NOTE — LETTER
8/14/2019       RE: Nancy Bronson  [address] [facility] [address]     Dear Colleague,    Thank you for referring your patient, Nancy Bronson, to the [facility]LAST[facility] RECONSTRU[facility]IVE SURGERY at [facility]. Please see a copy of my visit note below.    PRESENTING COMPLAINT:  Postoperative visit, status post malignant melanoma excision from the back with keystone flap reconstruction done on 07/18/2019.      HISTORY OF PRESENT ILLNESS:  Ms. Bronson is 49 years old.  She is about 3 weeks out from surgery, done very well.  No pain.  All of her pathology showed negative margins and no lymph nodes.      PHYSICAL EXAMINATION:  Vital signs stable.  She is afebrile, in no obvious distress.  The flap is healing in well.  Sutures in place.  No evidence of infection, seroma or hematoma.      ASSESSMENT AND PLAN:  Based on above findings, a diagnosis of back reconstruction with local flap was made.  I took out all the sutures.  Advised aggressive moisturization.  Continued follow up with Dermatology and Dr. Raman.  I will see her back on a p.r.n. basis.     Again, thank you for allowing me to participate in the care of your patient.      Sincerely,    DIPAK Ybarra MD

## 2019-08-14 NOTE — NURSING NOTE
Chief Complaint   Patient presents with     Surgical Followup     Pt here for post op and suture removal       There were no vitals filed for this visit.    There is no height or weight on file to calculate BMI.      MARLON Osei NREMT                    No vitals taken per provider

## 2019-08-14 NOTE — PROGRESS NOTES
PRESENTING COMPLAINT:  Postoperative visit, status post malignant melanoma excision from the back with keystone flap reconstruction done on 07/18/2019.      HISTORY OF PRESENT ILLNESS:  Ms. Bronson is 49 years old.  She is about 3 weeks out from surgery, done very well.  No pain.  All of her pathology showed negative margins and no lymph nodes.      PHYSICAL EXAMINATION:  Vital signs stable.  She is afebrile, in no obvious distress.  The flap is healing in well.  Sutures in place.  No evidence of infection, seroma or hematoma.      ASSESSMENT AND PLAN:  Based on above findings, a diagnosis of back reconstruction with local flap was made.  I took out all the sutures.  Advised aggressive moisturization.  Continued follow up with Dermatology and Dr. Raman.  I will see her back on a p.r.n. basis.

## 2019-09-10 ENCOUNTER — TRANSFERRED RECORDS (OUTPATIENT)
Dept: HEALTH INFORMATION MANAGEMENT | Facility: CLINIC | Age: 49
End: 2019-09-10

## 2019-11-04 ENCOUNTER — HEALTH MAINTENANCE LETTER (OUTPATIENT)
Age: 49
End: 2019-11-04

## 2019-12-10 ENCOUNTER — TRANSFERRED RECORDS (OUTPATIENT)
Dept: HEALTH INFORMATION MANAGEMENT | Facility: CLINIC | Age: 49
End: 2019-12-10

## 2019-12-27 ENCOUNTER — OFFICE VISIT (OUTPATIENT)
Dept: FAMILY MEDICINE | Facility: CLINIC | Age: 49
End: 2019-12-27
Payer: COMMERCIAL

## 2019-12-27 VITALS
WEIGHT: 221.1 LBS | BODY MASS INDEX: 35.53 KG/M2 | DIASTOLIC BLOOD PRESSURE: 70 MMHG | SYSTOLIC BLOOD PRESSURE: 111 MMHG | OXYGEN SATURATION: 98 % | HEART RATE: 89 BPM | TEMPERATURE: 98.9 F | HEIGHT: 66 IN

## 2019-12-27 DIAGNOSIS — C43.59 MALIGNANT MELANOMA OF SKIN OF TRUNK (H): ICD-10-CM

## 2019-12-27 DIAGNOSIS — M72.2 PLANTAR FASCIA SYNDROME: ICD-10-CM

## 2019-12-27 DIAGNOSIS — K62.89 RECTAL MASS: Primary | ICD-10-CM

## 2019-12-27 DIAGNOSIS — R06.83 SNORING: ICD-10-CM

## 2019-12-27 PROCEDURE — 99214 OFFICE O/P EST MOD 30 MIN: CPT | Performed by: PHYSICIAN ASSISTANT

## 2019-12-27 ASSESSMENT — MIFFLIN-ST. JEOR: SCORE: 1644.65

## 2019-12-27 NOTE — PROGRESS NOTES
Subjective     Nancy Bronson is a 49 year old female who presents to clinic today for the following health issues:    HPI   1) Elpidio has been tired lately.  Not sleeping well.   states she snores and gasps at night.    2) Diagnosed with melanoma and had surgery in July.  Has been going to Dermatologist in [place] but would like to go to [facility] for continuity in care    3) Plantar fascitis:  Saw podiatry and was told to get good fitting shoes.  Still having significant symptoms walks barefoot at home.     4) Bump inside of butt cheek : has had this for over 6 months and was tols to get this checked when diagnosed with melanoma          Reviewed and updated as needed this visit by Provider         Review of Systems   ROS COMP: Constitutional, HEENT, cardiovascular, pulmonary, gi and gu systems are negative, except as otherwise noted.      Objective    LMP 12/02/2012   There is no height or weight on file to calculate BMI.  Physical Exam   GENERAL: healthy, alert and no distress  RESP: lungs clear to auscultation - no rales, rhonchi or wheezes  CV: regular rate and rhythm, normal S1 S2, no S3 or S4, no murmur, click or rub, no peripheral edema and peripheral pulses strong  RECTAL (female): 1 cm  rectal mass on right buttock   PSYCH: mentation appears normal, affect normal/bright    Diagnostic Test Results:  Labs reviewed in Epic        Assessment & Plan     1. Rectal mass  Well refer to colorectal to excise   - COLORECTAL SURGERY REFERRAL    2. Snoring  Advised sleep study.   - SLEEP EVALUATION & MANAGEMENT REFERRAL - ADULT -[facility]  [phone] (Age 18 and up); Future    3. Malignant melanoma of skin of trunk (H)  Referred to [facility]   - DERMATOLOGY REFERRAL    4. Plantar fascia syndrome  Advised hard soled shoes such as z-coils.  Also advised no barefoot walking        BMI:   Estimated body mass index is 35.69 kg/m  as calculated from the following:    Height as of this encounter:  "1.676 m (5' 6\").    Weight as of this encounter: 100.3 kg (221 lb 1.6 oz).   Weight management plan: Discussed healthy diet and exercise guidelines            No follow-ups on file.    Ramona Ann Aaseby-Aguilera, PA-C  [facility]          "

## 2019-12-31 ENCOUNTER — TELEPHONE (OUTPATIENT)
Dept: DERMATOLOGY | Facility: CLINIC | Age: 49
End: 2019-12-31

## 2019-12-31 ENCOUNTER — TELEPHONE (OUTPATIENT)
Dept: SURGERY | Facility: CLINIC | Age: 49
End: 2019-12-31

## 2019-12-31 NOTE — TELEPHONE ENCOUNTER
[facility] Call Center    Phone Message    May a detailed message be left on voicemail: yes    Reason for Call: Other: Patient is being referred for melanoma. Please review and call patient to schedule.     Action Taken: Other: Dermatology

## 2019-12-31 NOTE — TELEPHONE ENCOUNTER
[facility] Call Center    Phone Message    May a detailed message be left on voicemail: yes    Reason for Call: Other: The patient called back to get scheduled for her new Melanoma please call patient back asap thank you.     Action Taken: Message routed to:  [facility] ([facility]): kirsten

## 2019-12-31 NOTE — TELEPHONE ENCOUNTER
Called and left message for pt to call back to schedule. Pt has had melanoma on back excised, referral to dermatology for continuous follow up/skin checks. Number provided to call back to schedule.

## 2019-12-31 NOTE — TELEPHONE ENCOUNTER
[facility] Call Center    Phone Message    May a detailed message be left on voicemail: yes    Reason for Call: Other: Patient is being referred for a rectal mass. Please review and call patient to schedule.     Action Taken: Other: Colon Rectal

## 2020-01-02 ENCOUNTER — TELEPHONE (OUTPATIENT)
Dept: SURGERY | Facility: CLINIC | Age: 50
End: 2020-01-02

## 2020-01-02 ENCOUNTER — PATIENT OUTREACH (OUTPATIENT)
Dept: SURGERY | Facility: CLINIC | Age: 50
End: 2020-01-02

## 2020-01-02 NOTE — PROGRESS NOTES
"Left VM for patient, she was referred by Aaseby-Aguilera, Ramona Ann, PA-C for evaluation of a bump \"inside butt cheek.\" Will have scheduling contact for appointment with next available doctor.  "

## 2020-01-10 ENCOUNTER — TELEPHONE (OUTPATIENT)
Dept: SURGERY | Facility: CLINIC | Age: 50
End: 2020-01-10

## 2020-01-29 ASSESSMENT — ENCOUNTER SYMPTOMS
CONSTIPATION: 0
RECTAL PAIN: 0
VOMITING: 0
BLOATING: 0
NAUSEA: 0
ABDOMINAL PAIN: 0
BOWEL INCONTINENCE: 0
HEARTBURN: 1
BLOOD IN STOOL: 0
JAUNDICE: 0
DIARRHEA: 0

## 2020-01-30 ENCOUNTER — OFFICE VISIT (OUTPATIENT)
Dept: ONCOLOGY | Facility: CLINIC | Age: 50
End: 2020-01-30
Attending: SURGERY
Payer: COMMERCIAL

## 2020-01-30 VITALS
RESPIRATION RATE: 16 BRPM | OXYGEN SATURATION: 98 % | WEIGHT: 220.1 LBS | HEIGHT: 66 IN | HEART RATE: 101 BPM | TEMPERATURE: 98.2 F | SYSTOLIC BLOOD PRESSURE: 118 MMHG | DIASTOLIC BLOOD PRESSURE: 77 MMHG | BODY MASS INDEX: 35.37 KG/M2

## 2020-01-30 DIAGNOSIS — C43.59 MALIGNANT MELANOMA OF TORSO EXCLUDING BREAST (H): Primary | ICD-10-CM

## 2020-01-30 PROCEDURE — G0463 HOSPITAL OUTPT CLINIC VISIT: HCPCS | Mod: ZF

## 2020-01-30 ASSESSMENT — MIFFLIN-ST. JEOR: SCORE: 1639.87

## 2020-01-30 ASSESSMENT — PAIN SCALES - GENERAL: PAINLEVEL: NO PAIN (0)

## 2020-01-30 NOTE — NURSING NOTE
"Oncology Rooming Note    January 30, 2020 11:13 AM   Nancy Bronson is a 49 year old female who presents for:    Chief Complaint   Patient presents with     Oncology Clinic Visit     [facility] POST- MELANOMA      Initial Vitals: /77 (BP Location: Right arm, Patient Position: Chair, Cuff Size: Adult Large)   Pulse 101   Temp 98.2  F (36.8  C)   Resp 16   Ht 1.676 m (5' 5.98\")   Wt 99.8 kg (220 lb 1.6 oz)   LMP 12/02/2012   SpO2 98%   BMI 35.54 kg/m   Estimated body mass index is 35.54 kg/m  as calculated from the following:    Height as of this encounter: 1.676 m (5' 5.98\").    Weight as of this encounter: 99.8 kg (220 lb 1.6 oz). Body surface area is 2.16 meters squared.  No Pain (0) Comment: Data Unavailable   Patient's last menstrual period was 12/02/2012.  Allergies reviewed: Yes  Medications reviewed: Yes    Medications: Medication refills not needed today.  Pharmacy name entered into [facility]:    [facility] [place], [place] - [address]  [facility]Y [id number] - [place], [place] - [address]  ABDIRAHMAN MENESES    Clinical concerns: No new concerns. Dr. Raman was notified.      Lino Levine LPN              "

## 2020-01-30 NOTE — PROGRESS NOTES
"FOLLOW-UP  Jan 30, 2020    Nancy Bronson is a 49 year old female who returns for follow-up for     Cancer Staging  Malignant melanoma of torso excluding breast (H)  Staging form: Melanoma of the Skin, AJCC 8th Edition  - Clinical: Stage IIB (cT3b, cN0, cM0) - Unsigned  - Pathologic: Stage IIB (pT3b, pN0, cM0) - Signed by Susan Raman MD on 7/30/2019    Treatment to date:  1. Wide local excision of left back melanoma, left axillary sentinel lymph node biopsy (7/18/2019)    HPI:    Since her last visit, has been doing well. She denies any headaches, dizziness, vision changes, abdominal pain, bowel habit changes, rectal bleeding, melena, chest pain, shortness of breath, or unintentional weight loss.  She has not noted any masses in either axilla or groin bilaterally. She has good range of motion of her shoulders bilaterally and denies any upper extremity swelling.    She had a skin check in September that identified two compound nevi with moderate dysplasia.  She had questions about what to do with them.  Since September, at least one of them has had pigment recurrence.    /77 (BP Location: Right arm, Patient Position: Chair, Cuff Size: Adult Large)   Pulse 101   Temp 98.2  F (36.8  C)   Resp 16   Ht 1.676 m (5' 5.98\")   Wt 99.8 kg (220 lb 1.6 oz)   LMP 12/02/2012   SpO2 98%   BMI 35.54 kg/m     Physical Exam  Constitutional:       Appearance: She is well-developed.   Abdominal:      General: There is no distension.      Palpations: Abdomen is soft. There is no fluid wave, hepatomegaly or mass.      Tenderness: There is no abdominal tenderness.   Lymphadenopathy:      Cervical: No cervical adenopathy.      Right cervical: No superficial, deep or posterior cervical adenopathy.     Left cervical: No superficial, deep or posterior cervical adenopathy.      Upper Body:      Right upper body: No supraclavicular, axillary, pectoral or epitrochlear adenopathy.      Left upper body: No supraclavicular, " axillary, pectoral or epitrochlear adenopathy.      Lower Body: No right inguinal adenopathy. No left inguinal adenopathy.      Comments: No lymphedema in bilateral upper extremities. Axillary incision unremarkable.   Skin:     General: Skin is warm and dry.      Comments: WLE site well healed. No nodularity. Numerous pigmented lesions on the back.        INVESTIGATIONS:  Biopsy from [facility] (9/10/2019) showed:  Final Diagnosis:  A. Skin, back, left, upper, medial: compound nevus with moderate dysplasia.   The lesion extends to the deep and lateral margins of the biopsy  B. 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    ASSESSMENT:    Nancy Bronson is a 49 year old female with melanoma, 6 months s/p surgery.    She is doing well without signs or symptoms of recurrence.    I reviewed the pathology report from September with her. We discussed that there is some controversy in the management of moderately dysplastic nevi; however, given that there was recurrence of the pigmented lesion, I agree that excision is warranted. She has numerous pigmented lesions over the back; I have recommended that she follow up with dermatology to determine what needs to be excised.  She would like to be referred to a dermatologist here to keep all of her records in one place.    We reviewed that surveillance involves a history and physical exam every 6 months for the first 5 years, then annually, with imaging studies only indicated if symptoms arise. We reviewed the signs and symptoms that could arise in the setting of recurrent locoregional or metastatic disease. In addition, she will need to undergo quarterly dermatologic full skin survey and evaluation given that patients with a diagnosis of melanoma are at risk of recurrence (local and distant) and of subsequent de sherrill melanoma.  I also reviewed the importance of protection  from sun exposure, including wearing long sleeves, hats, etc and also the use of sunscreen with SPF of at least 35, with frequent re-applications.    Total time spent with the patient was 25 minutes, of which more than half was counseling.     PLAN:  1. Follow up with me in 6 months  2. Referral to dermatology at [facility] per patient request    Susan Raman MD MSc FRC FACS    Division of Surgical Oncology  [facility]

## 2020-01-30 NOTE — LETTER
"     RE: Nancy Bronson  [address] [facility] [id number]     Dear Colleague,    Thank you for referring your patient, Nancy Bronson, to the [facility] at [facility]. Please see a copy of my visit note below.    FOLLOW-UP  Jan 30, 2020    Nancy Bronson is a 49 year old female who returns for follow-up for     Cancer Staging  Malignant melanoma of torso excluding breast (H)  Staging form: Melanoma of the Skin, AJCC 8th Edition  - Clinical: Stage IIB (cT3b, cN0, cM0) - Unsigned  - Pathologic: Stage IIB (pT3b, pN0, cM0) - Signed by Susan Raman MD on 7/30/2019    Treatment to date:  1. Wide local excision of left back melanoma, left axillary sentinel lymph node biopsy (7/18/2019)    HPI:    Since her last visit, has been doing well. She denies any headaches, dizziness, vision changes, abdominal pain, bowel habit changes, rectal bleeding, melena, chest pain, shortness of breath, or unintentional weight loss.  She has not noted any masses in either axilla or groin bilaterally. She has good range of motion of her shoulders bilaterally and denies any upper extremity swelling.    She had a skin check in September that identified two compound nevi with moderate dysplasia.  She had questions about what to do with them.  Since September, at least one of them has had pigment recurrence.    /77 (BP Location: Right arm, Patient Position: Chair, Cuff Size: Adult Large)   Pulse 101   Temp 98.2  F (36.8  C)   Resp 16   Ht 1.676 m (5' 5.98\")   Wt 99.8 kg (220 lb 1.6 oz)   LMP 12/02/2012   SpO2 98%   BMI 35.54 kg/m      Physical Exam  Constitutional:       Appearance: She is well-developed.   Abdominal:      General: There is no distension.      Palpations: Abdomen is soft. There is no fluid wave, hepatomegaly or mass.      Tenderness: There is no abdominal tenderness.   Lymphadenopathy:      Cervical: No cervical adenopathy.      Right cervical: No " superficial, deep or posterior cervical adenopathy.     Left cervical: No superficial, deep or posterior cervical adenopathy.      Upper Body:      Right upper body: No supraclavicular, axillary, pectoral or epitrochlear adenopathy.      Left upper body: No supraclavicular, axillary, pectoral or epitrochlear adenopathy.      Lower Body: No right inguinal adenopathy. No left inguinal adenopathy.      Comments: No lymphedema in bilateral upper extremities. Axillary incision unremarkable.   Skin:     General: Skin is warm and dry.      Comments: WLE site well healed. No nodularity. Numerous pigmented lesions on the back.        INVESTIGATIONS:  Biopsy from [facility] (9/10/2019) showed:  Final Diagnosis:  A. Skin, back, left, upper, medial: compound nevus with moderate dysplasia.   The lesion extends to the deep and lateral margins of the biopsy  B. 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    ASSESSMENT:    Nancy Bronson is a 49 year old female with melanoma, 6 months s/p surgery.    She is doing well without signs or symptoms of recurrence.    I reviewed the pathology report from September with her. We discussed that there is some controversy in the management of moderately dysplastic nevi; however, given that there was recurrence of the pigmented lesion, I agree that excision is warranted. She has numerous pigmented lesions over the back; I have recommended that she follow up with dermatology to determine what needs to be excised.  She would like to be referred to a dermatologist here to keep all of her records in one place.    We reviewed that surveillance involves a history and physical exam every 6 months for the first 5 years, then annually, with imaging studies only indicated if symptoms arise. We reviewed the signs and symptoms that could arise in the setting of recurrent locoregional or metastatic  disease. In addition, she will need to undergo quarterly dermatologic full skin survey and evaluation given that patients with a diagnosis of melanoma are at risk of recurrence (local and distant) and of subsequent de sherrill melanoma.  I also reviewed the importance of protection from sun exposure, including wearing long sleeves, hats, etc and also the use of sunscreen with SPF of at least 35, with frequent re-applications.    Total time spent with the patient was 25 minutes, of which more than half was counseling.     PLAN:  1. Follow up with me in 6 months  2. Referral to dermatology at [facility] per patient request    Susan Raman MD MSc [facility] FACS    Division of Surgical Oncology  [facility]

## 2020-02-05 NOTE — TELEPHONE ENCOUNTER
FUTURE VISIT INFORMATION      FUTURE VISIT INFORMATION:    Date: 2.10.20    Time: 3:00    Location:  DermSurg  REFERRAL INFORMATION:    Referring provider:  Ramona Aaseby-Aguilera, PA-C    Referring providers clinic:  [facility]    Reason for visit/diagnosis  consult for abnormal moles on the back- pathology scanned in     RECORDS REQUESTED FROM:       Clinic name Comments Records Status Imaging Status   [facility] 12.27.19 Ramona Aaseby-Aguilera Epic N/A   Oncology 1.30.20, 7.30.19, 7.18.19 Dr. Susan Raman Path # [id number] Epic N/A   [facility] 9.10.19  Epic N/A

## 2020-02-10 ENCOUNTER — PRE VISIT (OUTPATIENT)
Dept: DERMATOLOGY | Facility: CLINIC | Age: 50
End: 2020-02-10

## 2020-02-10 ENCOUNTER — OFFICE VISIT (OUTPATIENT)
Dept: DERMATOLOGY | Facility: CLINIC | Age: 50
End: 2020-02-10
Payer: COMMERCIAL

## 2020-02-10 DIAGNOSIS — Z85.820 HISTORY OF MELANOMA: ICD-10-CM

## 2020-02-10 DIAGNOSIS — D22.9 ATYPICAL NEVI: Primary | ICD-10-CM

## 2020-02-10 ASSESSMENT — PAIN SCALES - GENERAL: PAINLEVEL: NO PAIN (0)

## 2020-02-10 NOTE — PROGRESS NOTES
[facility] Dermatology Note    Dermatology Surgery Clinic  [facility]  Clinics and Surgery Center  [address] [place] [id number]    Dermatology Problem List:  1. History of melanoma, BD 3.5 with ulceration and 10 mitoses/1mm2, Stage IIB (cT3b, cN0, cM0)   - s/p WLE with keystone flap and negative left axillary sentinel lymph node biopsy (7/18/2019) with Gwyn Raman and Tate   2. Moderately atypical nevi, s/p biopsy 9/10/19 with pigment recurrence 12/2019, pending excision with Dr. Michel   - medial left upper back   - mid upper back     Encounter Date: Feb 10, 2020    CC:  Chief Complaint   Patient presents with     Derm Problem     Patient is here today for a consult for abnormal moles on back.        History of Present Illness:  Ms. Nancy Bronson is a 49 year old female who presents today for a Mohs consultation for excision of two nevi.     Patient has a history of malignant melanoma on the upper back that was diagnosed in Summer 2019 (see scanned path report 6/12/19).  Now status post wide local excision and keystone flap with negative sentinel lymph node biopsy on 7/18/2019 with Dr. Raman (Onc) and Dr. Ybarra (Plastics).  [facility], patient underwent biopsy of 2 atypical appearing nevi of her upper back.  Pathology showed moderately atypical nevi.  Then during her subsequent follow-up in December 2019, there was concern for pigment recurrence and therefore patient was referred by her primary care PA who is within the [facility] system to Dr. Michel for excision.    Today patient notes as she has not experienced any bleeding, pain, tenderness, itching, or any other symptoms at the site of these biopsies.  She denies any significant past medical history or daily medications.  She is a non-smoker.  Denies fevers, chills, unintended weight loss.  Interested in transferring her  dermatologic care to [facility] given that her melanoma excision was done here and that her primary PA is within the [facility].     The patient is otherwise feeling well. There are no other skin concerns at this time.      Past Medical History:   Patient Active Problem List   Diagnosis     Snoring     S/P hysterectomy     Anemia     Malignant melanoma of torso excluding breast (H)     Past Medical History:   Diagnosis Date     Anemia      NO ACTIVE PROBLEMS      Past Surgical History:   Procedure Laterality Date     ABDOMEN SURGERY  2012    hysterectomy     BIOPSY NODE SENTINEL Left 7/18/2019    Procedure: [place] Lymph Node Mapping and Biopsy;  Surgeon: Susan Raman MD;  Location:  OR     C C-SEC ONLY,PREV C-SEC  2001,2004    2     C NONSPECIFIC PROCEDURE  1975    tonsils     C NONSPECIFIC PROCEDURE  1985    jaw surgery     DAVINCI HYSTERECTOMY SUPRACERVICAL  12/4/2012    benign pathProcedure: DAVINCI HYSTERECTOMY SUPRACERVICAL;  Davinci Supracervical Hysterectomy with Bilateral Salpingectomy, Cystoscopy ;  Surgeon: Selene Cardenas DO;  Location:  OR     ENT SURGERY  1975     EXCISE LESION BACK N/A 7/18/2019    Procedure: Wide Local Excision of Back Melanoma;  Surgeon: Susan Raman MD;  Location:  OR     HEAD & NECK SURGERY  1986    jaw surgery     LAPAROSCOPIC TUBAL LIGATION  2007     PROCEDURE PLACEHOLDER PLASTIC N/A 7/18/2019    Procedure: Back Reconstruction With Local Tissue Rearrangement;  Surgeon: DIPAK Ybarra MD;  Location:  OR       Social History:  Social History     Socioeconomic History     Marital status:      Spouse name: Juan José     Number of children: 1     Years of education: 13     Highest education level: None   Occupational History     Occupation:      Comment: [place] [employer] [place].   Social Needs     Financial resource strain: None     Food insecurity:     Worry: None     Inability: None     Transportation needs:      Medical: None     Non-medical: None   Tobacco Use     Smoking status: Former Smoker     Smokeless tobacco: Never Used     Tobacco comment: quit in    Substance and Sexual Activity     Alcohol use: Yes     Comment: social     Drug use: No     Sexual activity: Not Currently     Partners: Male     Birth control/protection: Surgical   Lifestyle     Physical activity:     Days per week: None     Minutes per session: None     Stress: None   Relationships     Social connections:     Talks on phone: None     Gets together: None     Attends Yazidism service: None     Active member of club or organization: None     Attends meetings of clubs or organizations: None     Relationship status: None     Intimate partner violence:     Fear of current or ex partner: None     Emotionally abused: None     Physically abused: None     Forced sexual activity: None   Other Topics Concern     Parent/sibling w/ CABG, MI or angioplasty before 65F 55M? No   Social History Narrative     None       Family History:  Family History   Problem Relation Age of Onset     Respiratory Father         sleep apnea, hypertension     Family History Negative Mother      Family History Negative Brother         1     Cancer Maternal Grandmother         Hodgkins      Cancer Maternal Grandfather          lung cancer-smoker     Cancer Paternal Grandfather         leukemia     Family History Negative Daughter      Cancer - colorectal Maternal Aunt         -at age of diagnosis -early 40's     Cancer Other         Hodgkins and  faternal twin brother as well      Breast Cancer No family hx of         Medications:  Current Outpatient Medications   Medication Sig Dispense Refill     fluticasone (FLONASE) 50 MCG/ACT spray Spray 1 spray into both nostrils 2 times daily as needed for rhinitis or allergies 16 g 1     hydrocortisone (WESTCORT) 0.2 % external cream Apply topically 2 times daily (Patient not taking: Reported on 2019) 15 g 2        Allergies   Allergen Reactions     No Known Drug Allergies        Review of Systems:  As per HPI.  -Skin Establ Pt: The patient denies any new rash, pruritus, or lesions that are symptomatic, changing or bleeding, except as per HPI.  -Constitutional: The patient is feeling generally well.    Physical exam:  Vitals: LMP 12/02/2012   GEN: This is a well developed, well-nourished female in no acute distress, in a pleasant mood.    SKIN: Focused examination of the back was performed.  - Back with well-healed keystone flap scar  - Center off flap and R upper apex with evidence of pigment recurrence at prior biopsy site  - Many brown macules, waxy stuck on papules, and violaceous domes papules                Impression/Plan:  1. Atypical nevi x 2, back  - Reviewed pathology report and nature of diagnosis.  - Risks, benefits, and process of excision surgery were discussed including possibility of damage to surrounding anatomical structures and infection. Patient is comfortable proceeding with the surgery; consent form was obtained. Excision will be scheduled at patient's convenience.  - No indication for pre-op antibiotics.  - Pre-op written instructions provided.   - Message to Tere Olivier sent to obtain full records    2. History of melanoma, BD 3.5mm, Stage IIB (cT3b, cN0, cM0)   S/p WLE with keystone flap and negative left axillary sentinel lymph node biopsy (7/18/2019) with Gwyn Raman and Tate.   - follow-up with Dr. Richardson for next FBSE in 3/2020     Follow-up as scheduled for MMS.       Staff Involved:  Resident/Staff    Reshma Hamilton MD (PGY-2)  Dermatology Resident   Pager: [phone]      Attending Attestation:  I personally interviewed and examined the patient.  The patient was discussed with the resident.  I reviewed the resident note and agree with the findings.  Any edits are below.    History: H/O MM s/p WLE, SLN and [place] flap.  2 moderately atypical nevi biopsied on back with  regrowth    PE: repigmenting biopsy sites    A/P: Mod atypic nevi with regrowth -- pt prefers excision and this is reasonable.      Wesley Michel M.D.  Professor  Director of Dermatologic Surgery  Department of Dermatology

## 2020-02-10 NOTE — PROGRESS NOTES
[facility]   Biopsies in September   History of melanoma 7/2019  Dr. Richardson in March, last check Dec     No sx

## 2020-02-10 NOTE — NURSING NOTE
Chief Complaint   Patient presents with     Derm Problem     Patient is here today for a consult for abnormal moles on back.      Nesha Tran CMA

## 2020-02-10 NOTE — LETTER
2/10/2020     RE: Nancy Bronson  [address] [facility] [address]     Dear Colleague,    Thank you for referring your patient, Nancy Bronson, to the [facility] DERMATOLOGIC SURGERY at [facility]. Please see a copy of my visit note below.    [facility] Dermatology Note    Dermatology Surgery Clinic  [facility]  Clinics and Surgery Center  [address], [place] [address]    Dermatology Problem List:  1. History of melanoma, BD 3.5 with ulceration and 10 mitoses/1mm2, Stage IIB (cT3b, cN0, cM0)   - s/p WLE with keystone flap and negative left axillary sentinel lymph node biopsy (7/18/2019) with Gwyn Raman and Tate   2. Moderately atypical nevi, s/p biopsy 9/10/19 with pigment recurrence 12/2019, pending excision with Dr. Michel   - medial left upper back   - mid upper back     Encounter Date: Feb 10, 2020    CC:  Chief Complaint   Patient presents with     Derm Problem     Patient is here today for a consult for abnormal moles on back.        History of Present Illness:  Ms. Nancy Bronson is a 49 year old female who presents today for a Mohs consultation for excision of two nevi.     Patient has a history of malignant melanoma on the upper back that was diagnosed in Summer 2019 (see scanned path report 6/12/19).  Now status post wide local excision and keystone flap with negative sentinel lymph node biopsy on 7/18/2019 with Dr. Raman (Onc) and Dr. Ybarra (Plastics).  At a [facility], patient underwent biopsy of 2 atypical appearing nevi of her upper back.  Pathology showed moderately atypical nevi.  Then during her subsequent follow-up in December 2019, there was concern for pigment recurrence and therefore patient was referred by her primary care PA who is within the [facility] system to Dr. Michel for excision.    Today patient notes as she has  not experienced any bleeding, pain, tenderness, itching, or any other symptoms at the site of these biopsies.  She denies any significant past medical history or daily medications.  She is a non-smoker.  Denies fevers, chills, unintended weight loss.  Interested in transferring her dermatologic care to [facility] given that her melanoma excision was done here and that her primary PA is within the [facility].     The patient is otherwise feeling well. There are no other skin concerns at this time.      Past Medical History:   Patient Active Problem List   Diagnosis     Snoring     S/P hysterectomy     Anemia     Malignant melanoma of torso excluding breast (H)     Past Medical History:   Diagnosis Date     Anemia      NO ACTIVE PROBLEMS      Past Surgical History:   Procedure Laterality Date     ABDOMEN SURGERY  2012    hysterectomy     BIOPSY NODE SENTINEL Left 7/18/2019    Procedure: [place] Lymph Node Mapping and Biopsy;  Surgeon: Susan Raman MD;  Location:  OR     C C-SEC ONLY,PREV C-SEC  2001,2004    2     C NONSPECIFIC PROCEDURE  1975    tonsils     C NONSPECIFIC PROCEDURE  1985    jaw surgery     DAVINCI HYSTERECTOMY SUPRACERVICAL  12/4/2012    benign pathProcedure: DAVINCI HYSTERECTOMY SUPRACERVICAL;  Davinci Supracervical Hysterectomy with Bilateral Salpingectomy, Cystoscopy ;  Surgeon: Selene Cardenas DO;  Location:  OR     ENT SURGERY  1975     EXCISE LESION BACK N/A 7/18/2019    Procedure: Wide Local Excision of Back Melanoma;  Surgeon: Susan Raman MD;  Location:  OR     HEAD & NECK SURGERY  1986    jaw surgery     LAPAROSCOPIC TUBAL LIGATION  2007     PROCEDURE PLACEHOLDER PLASTIC N/A 7/18/2019    Procedure: Back Reconstruction With Local Tissue Rearrangement;  Surgeon: DIPAK Ybarra MD;  Location:  OR       Social History:  Social History     Socioeconomic History     Marital status:      Spouse name: Juan José Omalley of  children: 1     Years of education: 13     Highest education level: None   Occupational History     Occupation:      Comment: [employer] [place].   Social Needs     Financial resource strain: None     Food insecurity:     Worry: None     Inability: None     Transportation needs:     Medical: None     Non-medical: None   Tobacco Use     Smoking status: Former Smoker     Smokeless tobacco: Never Used     Tobacco comment: quit in    Substance and Sexual Activity     Alcohol use: Yes     Comment: social     Drug use: No     Sexual activity: Not Currently     Partners: Male     Birth control/protection: Surgical   Lifestyle     Physical activity:     Days per week: None     Minutes per session: None     Stress: None   Relationships     Social connections:     Talks on phone: None     Gets together: None     Attends Anglican service: None     Active member of club or organization: None     Attends meetings of clubs or organizations: None     Relationship status: None     Intimate partner violence:     Fear of current or ex partner: None     Emotionally abused: None     Physically abused: None     Forced sexual activity: None   Other Topics Concern     Parent/sibling w/ CABG, MI or angioplasty before 65F 55M? No   Social History Narrative     None       Family History:  Family History   Problem Relation Age of Onset     Respiratory Father         sleep apnea, hypertension     Family History Negative Mother      Family History Negative Brother         1     Cancer Maternal Grandmother         Hodgkins      Cancer Maternal Grandfather          lung cancer-smoker     Cancer Paternal Grandfather         leukemia     Family History Negative Daughter      Cancer - colorectal Maternal Aunt         -at age of diagnosis -early 40's     Cancer Other         Hodgkins and  faternal twin brother as well      Breast Cancer No family hx of         Medications:  Current Outpatient Medications   Medication  Sig Dispense Refill     fluticasone (FLONASE) 50 MCG/ACT spray Spray 1 spray into both nostrils 2 times daily as needed for rhinitis or allergies 16 g 1     hydrocortisone (WESTCORT) 0.2 % external cream Apply topically 2 times daily (Patient not taking: Reported on 8/14/2019) 15 g 2       Allergies   Allergen Reactions     No Known Drug Allergies        Review of Systems:  As per HPI.  -Skin Establ Pt: The patient denies any new rash, pruritus, or lesions that are symptomatic, changing or bleeding, except as per HPI.  -Constitutional: The patient is feeling generally well.    Physical exam:  Vitals: LMP 12/02/2012   GEN: This is a well developed, well-nourished female in no acute distress, in a pleasant mood.    SKIN: Focused examination of the back was performed.  - Back with well-healed keystone flap scar  - Center off flap and R upper apex with evidence of pigment recurrence at prior biopsy site  - Many brown macules, waxy stuck on papules, and violaceous domes papules                Impression/Plan:  1. Atypical nevi x 2, back  - Reviewed pathology report and nature of diagnosis.  - Risks, benefits, and process of excision surgery were discussed including possibility of damage to surrounding anatomical structures and infection. Patient is comfortable proceeding with the surgery; consent form was obtained. Excision will be scheduled at patient's convenience.  - No indication for pre-op antibiotics.  - Pre-op written instructions provided.   - Message to Tere Olivier sent to obtain full records    2. History of melanoma, BD 3.5mm, Stage IIB (cT3b, cN0, cM0)   S/p WLE with keystone flap and negative left axillary sentinel lymph node biopsy (7/18/2019) with Gwyn Raman and Tate.   - follow-up with Dr. Richardson for next FBSE in 3/2020     Follow-up as scheduled for [facility].       Staff Involved:  Resident/Staff    Reshma Hamilton MD (PGY-2)  Dermatology Resident   Pager: [phone]      Attending Attestation:  I  personally interviewed and examined the patient.  The patient was discussed with the resident.  I reviewed the resident note and agree with the findings.  Any edits are below.    History: H/O MM s/p WLE, SLN and [place] flap.  2 moderately atypical nevi biopsied on back with regrowth    PE: repigmenting biopsy sites    A/P: Mod atypic nevi with regrowth -- pt prefers excision and this is reasonable.      Wesley Michel M.D.  Professor  Director of Dermatologic Surgery  Department of Dermatology

## 2020-03-03 ENCOUNTER — OFFICE VISIT (OUTPATIENT)
Dept: SLEEP MEDICINE | Facility: CLINIC | Age: 50
End: 2020-03-03
Payer: COMMERCIAL

## 2020-03-03 VITALS
RESPIRATION RATE: 16 BRPM | WEIGHT: 222 LBS | BODY MASS INDEX: 35.68 KG/M2 | HEART RATE: 89 BPM | HEIGHT: 66 IN | DIASTOLIC BLOOD PRESSURE: 82 MMHG | SYSTOLIC BLOOD PRESSURE: 127 MMHG | OXYGEN SATURATION: 98 %

## 2020-03-03 DIAGNOSIS — G47.33 OSA (OBSTRUCTIVE SLEEP APNEA): ICD-10-CM

## 2020-03-03 DIAGNOSIS — R06.83 SNORING: ICD-10-CM

## 2020-03-03 DIAGNOSIS — G47.01 INSOMNIA DUE TO MEDICAL CONDITION: Primary | ICD-10-CM

## 2020-03-03 PROCEDURE — 99203 OFFICE O/P NEW LOW 30 MIN: CPT | Performed by: INTERNAL MEDICINE

## 2020-03-03 RX ORDER — ZOLPIDEM TARTRATE 5 MG/1
TABLET ORAL
Qty: 1 TABLET | Refills: 0 | Status: SHIPPED | OUTPATIENT
Start: 2020-03-03 | End: 2020-06-12

## 2020-03-03 ASSESSMENT — MIFFLIN-ST. JEOR: SCORE: 1648.42

## 2020-03-03 NOTE — NURSING NOTE
"Chief Complaint   Patient presents with     Consult     Snoring, tired       Initial /82   Pulse 89   Resp 16   Wt 100.7 kg (222 lb)   LMP 12/02/2012   SpO2 98%   BMI 35.85 kg/m   Estimated body mass index is 35.85 kg/m  as calculated from the following:    Height as of 1/30/20: 1.676 m (5' 5.98\").    Weight as of this encounter: 100.7 kg (222 lb).    Medication Reconciliation: complete    Neck circumference: 17.5 inches / 43 centimeters.    ESS 12  Eve Carrillo MA      "

## 2020-03-03 NOTE — PROGRESS NOTES
[facility]   Outpatient Sleep Medicine Consultation  March 3, 2020      Name: Nancy Bronson MRN# [id number]   Age: 49 year old YOB: 1970     Date of Consultation: March 3, 2020  Consultation is requested by: Ramona Ann Aaseby-Aguilera, PA-C  [id number] [place] [address] Ramona Ann Aaseby-Aguil*  Primary care provider: Selene Cardenas         Reason for Sleep Consult:     Nancy Bronson is a 49 year old female for complaints of loud snoring and interrupted breathing for the majority of her adult life         Assessment and Plan:     Summary Sleep Diagnoses:      Clinical symptoms and signs of obstructive sleep apnea    Insomnia with features of psychophysiological insomnia and use of electronic equipment possible arousal trigger related to sleep apnea    Mild intermittent asthma predominantly in childhood    Status post melanoma excision    Obesity    RECOMMENDATIONS: Polysomnography in preference to home sleep test in the situation given nightly nocturnal awakenings of an hour or more which may interfere with the specificity of home sleep testing.  Patient will be given zolpidem 5 mg for sleep initiation and follow-up through my chart.    Summary Counseling: We discussed potential complications of untreated sleep apnea including cardiovascular risk and mood disturbance and reviewed potential therapy options and diagnostic procedural options.  She has not made a decision regarding recommendation of CPAP versus mandibular advancement therapy as this may be defined by the severity of her sleep apnea.  She would be interested in closing the decision through my chart or clinic visit if it appeared to be an ambiguous diagnostic test.  She preferred polysomnography given her significant insomnia and nightly awakenings that might interfere with the specificity of a home sleep test.           History of Present Illness:     Nancy Bronson is a 49 year old female has  had loud snoring interrupted breathing even in the seated position through most of her adult life with a tendency for daytime sleepiness with an elevated [place] Johns Sleepiness Scale currently of 12 and napping inappropriately during the day during quiet activities without fall asleep driving a without falling asleep during engaging activities.  She has no accompanying cardiovascular risk factors.      SLEEP-WAKE SCHEDULE:     Bedtime 930 to 10 PM weekdays 10:50 PM weekends    Awakening time with great difficulty 6 AM using the snooze alarm 3 times on weekdays with spontaneous awakenings 9:52 AM on weekends  Uses hand-held electronic devices when she awakens during the night    SLEEP COMPLAINTS:  Cardio-respiratory    Snoring- 7/week  Dyspnea- denies  Morning headaches or confusion-denies  Coexisting Lung disease: denies    Coexisting Heart disease: denies    Does patient have a bed partner: denies  Has bed partner been sleeping separately because of snoring:  denies            RLS Screen: When you try to relax in the evening or sleep at  night, do you ever have unpleasant, restless feelings in your  legs that can be relieved by walking or movement? denies    Periodic limb movement: denies    Narcolepsy:  denies sudden urges of sleep attacks    Cataplexy:  denies     Sleep paralysis:  denies      Hallucinations:   denies       Sleep Behaviors:    Leg symptoms/movements: denies    Motor restlessness:denies    Night terrors: denies    Bruxism: denies    Automatic behaviors: none    Other subjective complaints:    Anxiety or rumination denies    Pain and discomfort at  night: denies    Waking up with heart pounding or racing: denies    GERD or aspiration:denies         Parasomnia:   NREM - denies recurrent persistent confusional arousal, night eating, sleep walking or sleep terrors   REM  - denies dream enactment; injuries                Medications:     Current Outpatient Medications   Medication Sig     fluticasone  (FLONASE) 50 MCG/ACT spray Spray 1 spray into both nostrils 2 times daily as needed for rhinitis or allergies     hydrocortisone (WESTCORT) 0.2 % external cream Apply topically 2 times daily (Patient not taking: Reported on 8/14/2019)     No current facility-administered medications for this visit.         Allergies   Allergen Reactions     No Known Drug Allergies             Past Medical History:     Does not need 02 supplement at night   Past Medical History:   Diagnosis Date     Anemia      NO ACTIVE PROBLEMS              Past Surgical History:    Previous upper airway surgery none   Past Surgical History:   Procedure Laterality Date     ABDOMEN SURGERY  2012    hysterectomy     BIOPSY NODE SENTINEL Left 7/18/2019    Procedure: [place] Lymph Node Mapping and Biopsy;  Surgeon: Susan Raman MD;  Location: [facility] OR     C C-SEC ONLY,PREV C-SEC  2001,2004    2     C NONSPECIFIC PROCEDURE  1975    tonsils     C NONSPECIFIC PROCEDURE  1985    jaw surgery     DAVINCI HYSTERECTOMY SUPRACERVICAL  12/4/2012    benign pathProcedure: DAVINCI HYSTERECTOMY SUPRACERVICAL;  Davinci Supracervical Hysterectomy with Bilateral Salpingectomy, Cystoscopy ;  Surgeon: Selene Cardenas DO;  Location:  OR     ENT SURGERY  1975     EXCISE LESION BACK N/A 7/18/2019    Procedure: Wide Local Excision of Back Melanoma;  Surgeon: Susan Raman MD;  Location:  OR     HEAD & NECK SURGERY  1986    jaw surgery     LAPAROSCOPIC TUBAL LIGATION  2007     PROCEDURE PLACEHOLDER PLASTIC N/A 7/18/2019    Procedure: Back Reconstruction With Local Tissue Rearrangement;  Surgeon: DIPAK Ybarra MD;  Location:  OR            Social History:     Social History     Tobacco Use     Smoking status: Former Smoker     Smokeless tobacco: Never Used     Tobacco comment: quit in 1995   Substance Use Topics     Alcohol use: Yes     Comment: social         Rare alcohol intake           Family History:     Family History   Problem  Relation Age of Onset     Respiratory Father         sleep apnea, hypertension     Family History Negative Mother      Family History Negative Brother         1     Cancer Maternal Grandmother         Hodgkins      Cancer Maternal Grandfather          lung cancer-smoker     Cancer Paternal Grandfather         leukemia     Family History Negative Daughter      Cancer - colorectal Maternal Aunt         -at age of diagnosis -early 40's     Cancer Other         Hodgkins and  faternal twin brother as well      Breast Cancer No family hx of         Sleep Family Hx:               Review of Systems:     A complete 10 point review of systems was negative other than HPI or as commented below:   Dyspnea with exertion and occasional wheezing with remote history of asthma         Physical Examination:   No flowsheet data found.    Constitutional: . Awake, alert, cooperative, dressed casually, good eye contact, comfortably sitting in a chair, in no apparent distress  Mood: euthymic; affect congruent with full range and intensity.  Attention/Concentration:  Normal   Eyes: No icterus.  ENT: Mallampati I  Cardiovascular: Regular S1 and S2, no gallops or murmurs. No carotid bruits  Neck: Supple, no thyroid enlargement.   Pulmonary:  Chest symmetric, lungs clear bilaterally and no crackles, wheezes or rales  Extremities:  No pedal edema.  Muscle/joint: Strength and tone normal   Skin:  No rash or significant lesions.   Gait: Normal.  Neurologic: Alert, oriented x3, no focal neurological deficit, cranial nerves grossly normal            Data: All pertinent previous laboratory data reviewed     No results found for: PH, PHARTERIAL, PO2, [id number], SAT, PCO2, HCO3, BASEEXCESS, MARYLOU, BEB  Lab Results   Component Value Date    TSH 2.71 2019    TSH 2.17 2014     Lab Results   Component Value Date    GLC 72 2019    GLC 77 2019     Lab Results   Component Value Date    HGB 13.7 2019     HGB 14.3 04/01/2019     Lab Results   Component Value Date    BUN 7 07/12/2019    BUN 7 04/01/2019    CR 0.90 07/12/2019    CR 0.88 04/01/2019     Lab Results   Component Value Date    AST 12 04/01/2019    AST 22 04/22/2014    ALT 19 04/01/2019    ALT 23 04/22/2014    ALKPHOS 99 04/01/2019    ALKPHOS 89 04/22/2014    BILITOTAL 0.4 04/01/2019    BILITOTAL 0.6 04/22/2014     No results found for: UAMP, UBARB, BENZODIAZEUR, UCANN, UCOC, OPIT, UPCP        Copy to: Selene Cardenas MD 3/3/2020     Total time spent with patient: 35 min >50% counseling

## 2020-03-03 NOTE — PATIENT INSTRUCTIONS
Your BMI is Body mass index is 35.85 kg/m .  Weight management is a personal decision.  If you are interested in exploring weight loss strategies, the following discussion covers the approaches that may be successful. Body mass index (BMI) is one way to tell whether you are at a healthy weight, overweight, or obese. It measures your weight in relation to your height.  A BMI of 18.5 to 24.9 is in the healthy range. A person with a BMI of 25 to 29.9 is considered overweight, and someone with a BMI of 30 or greater is considered obese. More than two-thirds of American adults are considered overweight or obese.  Being overweight or obese increases the risk for further weight gain. Excess weight may lead to heart disease and diabetes.  Creating and following plans for healthy eating and physical activity may help you improve your health.  Weight control is part of healthy lifestyle and includes exercise, emotional health, and healthy eating habits. Careful eating habits lifelong are the mainstay of weight control. Though there are significant health benefits from weight loss, long-term weight loss with diet alone may be very difficult to achieve- studies show long-term success with dietary management in less than 10% of people. Attaining a healthy weight may be especially difficult to achieve in those with severe obesity. In some cases, medications, devices and surgical management might be considered.  What can you do?  If you are overweight or obese and are interested in methods for weight loss, you should discuss this with your provider.     Consider reducing daily calorie intake by 500 calories.     Keep a food journal.     Avoiding skipping meals, consider cutting portions instead.    Diet combined with exercise helps maintain muscle while optimizing fat loss. Strength training is particularly important for building and maintaining muscle mass. Exercise helps reduce stress, increase energy, and improves fitness.  Increasing exercise without diet control, however, may not burn enough calories to loose weight.       Start walking three days a week 10-20 minutes at a time    Work towards walking thirty minutes five days a week     Eventually, increase the speed of your walking for 1-2 minutes at time    In addition, we recommend that you review healthy lifestyles and methods for weight loss available through the National Institutes of Health patient information sites:  http://win.niddk.nih.gov/publications/index.htm    And look into health and wellness programs that may be available through your health insurance provider, employer, local community center, or jorge club.    Weight management plan: Patient was referred to their PCP to discuss a diet and exercise plan.

## 2020-03-04 ENCOUNTER — TELEPHONE (OUTPATIENT)
Dept: DERMATOLOGY | Facility: CLINIC | Age: 50
End: 2020-03-04

## 2020-03-04 ENCOUNTER — OFFICE VISIT (OUTPATIENT)
Dept: DERMATOLOGY | Facility: CLINIC | Age: 50
End: 2020-03-04
Payer: COMMERCIAL

## 2020-03-04 VITALS — SYSTOLIC BLOOD PRESSURE: 118 MMHG | HEART RATE: 89 BPM | DIASTOLIC BLOOD PRESSURE: 84 MMHG

## 2020-03-04 DIAGNOSIS — D22.9 MULTIPLE BENIGN NEVI: ICD-10-CM

## 2020-03-04 DIAGNOSIS — L82.1 SEBORRHEIC KERATOSIS: ICD-10-CM

## 2020-03-04 DIAGNOSIS — D18.01 CHERRY ANGIOMA: ICD-10-CM

## 2020-03-04 DIAGNOSIS — D22.9 ATYPICAL NEVI: Primary | ICD-10-CM

## 2020-03-04 DIAGNOSIS — L81.4 SOLAR LENTIGO: ICD-10-CM

## 2020-03-04 DIAGNOSIS — Z85.820 HISTORY OF MELANOMA: Primary | ICD-10-CM

## 2020-03-04 DIAGNOSIS — D48.5 NEOPLASM OF UNCERTAIN BEHAVIOR OF SKIN: ICD-10-CM

## 2020-03-04 ASSESSMENT — PAIN SCALES - GENERAL
PAINLEVEL: NO PAIN (0)

## 2020-03-04 NOTE — PROGRESS NOTES
DERMATOLOGIC EXCISION SURGERY PROCEDURE NOTE     Dermatology Surgery Clinic  [facility]  [address] [place] [id number]    Dermatology Problem List:  0. NUB, R upper back; s/p Bx 3/4/20 DDx atypical nevus r/o melanoma  1. History of melanoma, BD 3.5 with ulceration and 10 mitoses/1mm2, Stage IIB (cT3b, cN0, cM0)   - s/p WLE with keystone flap and negative left axillary sentinel lymph node biopsy (7/18/2019) with Gwyn Raman and Tate   2. Moderately atypical nevi x2, s/p biopsy 9/10/19 with pigment recurrence 12/2019 of the medial left upper back and mid upper back   - s/p excision 3/4/20    Date: March 4, 2020     NAME OF PROCEDURE: Excision with complex linear closure  Staff surgeon: Wesley Michel MD  Fellow: Gómez Villalpando MD  PRE-OPERATIVE DIAGNOSIS:  Atypical nevus  POST-OPERATIVE DIAGNOSIS: Same   FINAL EXCISION SIZE(EXCISION DEFECT SIZE): 0.7 cm, with 3.0 mm margin   FINAL REPAIR LENGTH: 2.3 cm   INDICATIONS: This patient presented with a 0.4 cm atypical nevus of the L upper back medial. Excision was indicated.   We discussed the principles of treatment and most likely complications including bleeding, infection, scarring, alteration in skin color and sensation, wound dehiscence,muscle weakness in the area, or recurrence of the lesion or disease. We reviewed that on occasion, after healing, a secondary procedure or revision may be recommended in order to obtain the best cosmetic or functional result.     PROCEDURE: The patient was taken to the operative suite. Time-out was performed. The treatment area was anesthetized with 1% lidocaine and epinephrine (1:100,000). The area was washed with Hibiclens, rinsed with saline and draped with sterile towels. The lesion was delineated and excised down to deep subcutaneous fat in an elliptical manner. Hemostasis was obtained by electrocoagulation.     REPAIR: A complex layered linear closure was selected as the  procedure which would maximally preserve both function and cosmesis and for the following reasons: Extensive undermining (equal to or greater than the maximum perpendicular width of the defect), exposure of underlying structure (bone, cartilage, tendon, named neurovascular), free margin involvement (helical rim, vermillion border, nostril rim), and/or placement of retention sutures.     The patient was taken to the operative suite and placed supine on the operating room table. The defect was identified. Appropriate markings were made with a marking pen to plan the repair. The area was infiltrated with Lidocaine 1% with epi 1:100,000 and prepped with Hibiclens and draped with sterile towels.     The wound was debeveled and undermined widely. Cones were excised within relaxed skin tension lines on both sides of the defect. Hemostasis was obtained using electrocoagulation. Subcutaneous tissues were then approximated using 4-0 Monocryl buried vertical mattress sutures. The wound edges were then approximated additional 4-0 Monocryl buried sutures were placed in a similar fashion where needed. Percutaneous simple running 5-0 fast absorbing gut sutures were carefully placed for maximum eversion and meticulous approximation.    The final wound length was 2.3 cm. A total of 6.0 ml of anesthesia was administered for all surgical sites. Estimated blood loss was less than 10 ml for all surgical sites. A sterile pressure dressing was applied and wound care instructions, with a written handout, were given. The patient was discharged from the [facility] Center alert and ambulatory.    Follow up as needed.       Staff Involved:    Scribe Disclosure  CHRIS, Phylicia Valverde, am serving as a scribe to document services personally performed by Dr. Wesley Michel, based on data collection and the provider's statements to me.       Attending attestation:  CHRIS was present for key elements of the procedure and immediately available for all other  portions of the procedure.  I have reviewed the note and edited it as necessary.    Wesley Michel M.D.  Professor  Director of Dermatologic Surgery  Department of Dermatology  [facility]    Dermatology Surgery Clinic  [facility] [place] [id number]

## 2020-03-04 NOTE — NURSING NOTE
Chief Complaint   Patient presents with     Derm Problem     Elpidio is here today for an Excision on the Left Upper Back for Abnormal Moles.     EULALIO MENESES on 3/4/2020 at 12:51 PM    Dressed the wound by adding Vaseline, Telfa, Dental Rolls and holding it down with Tape. Gave patient instructions on wound care. Patient decided to send in photos on the 3rd month. Also she already has a Skin Exam with  and will let us know if there are any concerns afterwards.

## 2020-03-04 NOTE — LETTER
3/4/2020       RE: Nancy Bronson  [address] [facility] [address]     Dear Colleague,    Thank you for referring your patient, Nancy Bronson, to the [facility] DERMATOLOGY at [facility]. Please see a copy of my visit note below.    [facility] Dermatology Note    Dermatology Problem List:  0. NUB, right upper back ddx atypical nevus r/o melanoma  1. History of melanoma, BD 3.5 with ulceration and 10 mitoses/1mm2, Stage IIB (cT3b, cN0, cM0)   - s/p WLE with keystone flap and negative left axillary sentinel lymph node biopsy (7/18/2019) with Gwyn Raman and Tate   2. Moderately atypical nevi, s/p biopsy 9/10/19 with pigment recurrence 12/2019, pending excision with Dr. Michel   - medial left upper back   - mid upper back     Encounter Date: Mar 4, 2020    CC:  Chief Complaint   Patient presents with     Skin Check     skin check - no new concerns        History of Present Illness:  Ms. Nancy Bronson is a 49 year old female with a history of melanoma s/p WLE with keystone flap and negative left axillary sentinel lymph node biopsy  who presents for a skin check. Last seen by Dr. Michel two weeks ago for a pre-op visit for two atypical nevi on the back with pigment recurrence.     Today, she is here for a full body skin examination to evaluate the lesions on her trunk and extremities. The patient was previously seeing a dermatologist in [facility] and recently transferred her cares here. She will be seeing Dr. Michel later today for excision of x2 atypical nevus.    The patient otherwise denies any new or concerning lesions. No bleeding, painful, pruritic, or changing lesions. They report a personal history of melanoma, BD 3.5mm, Stage IIB (cT3b, cN0, cM0) S/p WLE with keystone flap and negative left axillary sentinel lymph node biopsy.  There is a family history of skin cancer (father, NMSC). No history of immunosuppression. Prior history of indoor  tanning (several during high school).  No occupational exposure to ultraviolet light or other forms of radiation. Patient is a .  Health otherwise stable. No other skin concerns.     Past Medical History:   Patient Active Problem List   Diagnosis     Snoring     S/P hysterectomy     Anemia     Malignant melanoma of torso excluding breast (H)     Past Medical History:   Diagnosis Date     Anemia      NO ACTIVE PROBLEMS      Past Surgical History:   Procedure Laterality Date     ABDOMEN SURGERY      hysterectomy     BIOPSY NODE SENTINEL Left 2019    Procedure: [place] Lymph Node Mapping and Biopsy;  Surgeon: Susan Raman MD;  Location:  OR     C C-SEC ONLY,PREV C-SEC  ,    2     C NONSPECIFIC PROCEDURE      tonsils     C NONSPECIFIC PROCEDURE      jaw surgery     DAVINCI HYSTERECTOMY SUPRACERVICAL  2012    benign pathProcedure: DAVINCI HYSTERECTOMY SUPRACERVICAL;  Davinci Supracervical Hysterectomy with Bilateral Salpingectomy, Cystoscopy ;  Surgeon: Selene Cardenas DO;  Location:  OR     ENT SURGERY       EXCISE LESION BACK N/A 2019    Procedure: Wide Local Excision of Back Melanoma;  Surgeon: Susan Raman MD;  Location:  OR     HEAD & NECK SURGERY      jaw surgery     LAPAROSCOPIC TUBAL LIGATION       PROCEDURE PLACEHOLDER PLASTIC N/A 2019    Procedure: Back Reconstruction With Local Tissue Rearrangement;  Surgeon: DIPAK Ybarra MD;  Location:  OR       Social History:  Patient reports that she has quit smoking. She has never used smokeless tobacco. She reports current alcohol use. She reports that she does not use drugs.    Family History:  Family History   Problem Relation Age of Onset     Respiratory Father         sleep apnea, hypertension     Family History Negative Mother      Family History Negative Brother         1     Cancer Maternal Grandmother         Hodgkins      Cancer Maternal Grandfather           lung cancer-smoker     Cancer Paternal Grandfather         leukemia     Family History Negative Daughter      Cancer - colorectal Maternal Aunt         -at age of diagnosis -early 40's     Cancer Other         Hodgkins and  faternal twin brother as well      Breast Cancer No family hx of        Medications:  Current Outpatient Medications   Medication Sig Dispense Refill     fluticasone (FLONASE) 50 MCG/ACT spray Spray 1 spray into both nostrils 2 times daily as needed for rhinitis or allergies 16 g 1     hydrocortisone (WESTCORT) 0.2 % external cream Apply topically 2 times daily 15 g 2     zolpidem (AMBIEN) 5 MG tablet Take tablet by mouth 15 minutes prior to sleep, for Sleep Study 1 tablet 0       Allergies   Allergen Reactions     No Known Drug Allergies        Review of Systems:  -Constitutional: Patient is otherwise feeling well, in usual state of health.   -Skin: As above in HPI. No additional skin concerns.  -Lymph: no cervical, axillary or inguinal lymphadenopathy.     Physical exam:  Vitals: LMP 2012   GEN: This is a well developed, well-nourished female in no acute distress, in a pleasant mood.    SKIN: Full skin, which includes the head/face, both arms, chest, back, abdomen,both legs, genitalia and/or groin buttocks, digits and/or nails, was examined.  - Archuleta Type I  - Scattered brown macules on sun exposed areas.  - Dome shaped bright red papules on the trunk and extremities.   - Multiple regular brown pigmented macules and papules with normal symmetric reticular network on the trunk and extremities.   - Waxy stuck on tan to brown papules on the trunk and extremities  - Well healed flap on the central back from prior melanoma wide local excision  - On the right upper back, there is a 1.5 x 0.5 cm brown patch with slightly asymmetric reticular network and white scar like areas centrally  - No other lesions of concern on areas examined.              Impression/Plan:    1.  History of melanoma, BD 3.5mm, Stage IIB (cT3b, cN0, cM0)   S/p WLE with keystone flap and negative left axillary sentinel lymph node biopsy (7/18/2019) with Gwyn Raman and Tate.     Recommend sunscreens SPF #30 or greater, protective clothing and avoidance of tanning beds.    ABCDs of melanoma were discussed and self skin checks were advised.     Continue q3 monthly skin checks for 2 years; then q6 monthly for 3 years; then yearly for life.    2.  Multiple clinically benign lesions: solar lentigines, nevi    Recommend sunscreens SPF #30 or greater, protective clothing and avoidance of tanning beds.    ABCDs of melanoma were discussed and self skin checks were advised.     3.   Benign findings including: seborrheic keratoses, cherry angioma    No further intervention required. Patient to report changes.     Patient reassured of the benign nature of these lesions.    4.   NUB, right upper back. ddx atypical nevus r/o melanoma      Shave biopsy:  After discussion of benefits and risks including but not limited to bleeding/bruising, pain/swelling, infection, scar, incomplete removal, nerve damage/numbness, recurrence, and non-diagnostic biopsy, written consent, verbal consent and photographs were obtained. Time-out was performed. The area was cleaned with isopropyl alcohol. 0.5mL of 1% lidocaine with epinephrine was injected to obtain adequate anesthesia of the lesion on the right upper back. A shave biopsy was performed. Hemostasis was achieved with aluminium chloride. Vaseline and a sterile dressing were applied. The patient tolerated the procedure and no complications were noted. The patient was provided with verbal and written post care instructions.    Photos taken for clinical surveillance       Follow-up in 3 months for a FBSE with Dr. Richardson, paco for new or changing lesions.     Staff Involved:  Scribe/Staff    Scribe Disclosure  I, Mariposa Alfaro, am serving as a scribe  to document services personally performed by Dr. Santino Kimball MD, based on data collection and the provider's statements to me.     Provider Disclosure:   The documentation recorded by the scribe accurately reflects the services I personally performed and the decisions made by me.    Santino Kimball MD    Department of Dermatology  [facility]: Phone: [phone], Fax:[phone]  [facility]: Phone: [phone] Fax: [phone]

## 2020-03-04 NOTE — PROGRESS NOTES
[facility] Dermatology Note    Dermatology Problem List:  0. NUB, right upper back ddx atypical nevus r/o melanoma  1. History of melanoma, BD 3.5 with ulceration and 10 mitoses/1mm2, Stage IIB (cT3b, cN0, cM0)   - s/p WLE with keystone flap and negative left axillary sentinel lymph node biopsy (7/18/2019) with Gwyn Raman and Tate   2. Moderately atypical nevi, s/p biopsy 9/10/19 with pigment recurrence 12/2019, pending excision with Dr. Michel   - medial left upper back   - mid upper back     Encounter Date: Mar 4, 2020    CC:  Chief Complaint   Patient presents with     Skin Check     skin check - no new concerns        History of Present Illness:  Ms. Nancy Bronson is a 49 year old female with a history of melanoma s/p WLE with keystone flap and negative left axillary sentinel lymph node biopsy  who presents for a skin check. Last seen by Dr. Michel two weeks ago for a pre-op visit for two atypical nevi on the back with pigment recurrence.     Today, she is here for a full body skin examination to evaluate the lesions on her trunk and extremities. The patient was previously seeing a dermatologist in [facility] and recently transferred her cares here. She will be seeing Dr. Michel later today for excision of x2 atypical nevus.    The patient otherwise denies any new or concerning lesions. No bleeding, painful, pruritic, or changing lesions. They report a personal history of melanoma, BD 3.5mm, Stage IIB (cT3b, cN0, cM0) S/p WLE with keystone flap and negative left axillary sentinel lymph node biopsy.  There is a family history of skin cancer (father, NMSC). No history of immunosuppression. Prior history of indoor tanning (several during high school).  No occupational exposure to ultraviolet light or other forms of radiation. Patient is a .  Health otherwise stable. No other skin concerns.     Past Medical History:   Patient Active Problem List   Diagnosis     Snoring     S/P  hysterectomy     Anemia     Malignant melanoma of torso excluding breast (H)     Past Medical History:   Diagnosis Date     Anemia      NO ACTIVE PROBLEMS      Past Surgical History:   Procedure Laterality Date     ABDOMEN SURGERY      hysterectomy     BIOPSY NODE SENTINEL Left 2019    Procedure: [place] Lymph Node Mapping and Biopsy;  Surgeon: Susan Raman MD;  Location:  OR     C C-SEC ONLY,PREV C-SEC  ,    2     C NONSPECIFIC PROCEDURE      tonsils     C NONSPECIFIC PROCEDURE      jaw surgery     DAVINCI HYSTERECTOMY SUPRACERVICAL  2012    benign pathProcedure: DAVINCI HYSTERECTOMY SUPRACERVICAL;  Davinci Supracervical Hysterectomy with Bilateral Salpingectomy, Cystoscopy ;  Surgeon: Selene Cardenas DO;  Location:  OR     ENT SURGERY       EXCISE LESION BACK N/A 2019    Procedure: Wide Local Excision of Back Melanoma;  Surgeon: Susan Raman MD;  Location:  OR     HEAD & NECK SURGERY      jaw surgery     LAPAROSCOPIC TUBAL LIGATION       PROCEDURE PLACEHOLDER PLASTIC N/A 2019    Procedure: Back Reconstruction With Local Tissue Rearrangement;  Surgeon: DIPAK Ybarra MD;  Location:  OR       Social History:  Patient reports that she has quit smoking. She has never used smokeless tobacco. She reports current alcohol use. She reports that she does not use drugs.    Family History:  Family History   Problem Relation Age of Onset     Respiratory Father         sleep apnea, hypertension     Family History Negative Mother      Family History Negative Brother         1     Cancer Maternal Grandmother         Hodgkins      Cancer Maternal Grandfather          lung cancer-smoker     Cancer Paternal Grandfather         leukemia     Family History Negative Daughter      Cancer - colorectal Maternal Aunt         -at age of diagnosis -early 40's     Cancer Other         Hodgkins and  faternal twin brother as well       Breast Cancer No family hx of        Medications:  Current Outpatient Medications   Medication Sig Dispense Refill     fluticasone (FLONASE) 50 MCG/ACT spray Spray 1 spray into both nostrils 2 times daily as needed for rhinitis or allergies 16 g 1     hydrocortisone (WESTCORT) 0.2 % external cream Apply topically 2 times daily 15 g 2     zolpidem (AMBIEN) 5 MG tablet Take tablet by mouth 15 minutes prior to sleep, for Sleep Study 1 tablet 0       Allergies   Allergen Reactions     No Known Drug Allergies        Review of Systems:  -Constitutional: Patient is otherwise feeling well, in usual state of health.   -Skin: As above in HPI. No additional skin concerns.  -Lymph: no cervical, axillary or inguinal lymphadenopathy.     Physical exam:  Vitals: LMP 12/02/2012   GEN: This is a well developed, well-nourished female in no acute distress, in a pleasant mood.    SKIN: Full skin, which includes the head/face, both arms, chest, back, abdomen,both legs, genitalia and/or groin buttocks, digits and/or nails, was examined.  - Archuleta Type I  - Scattered brown macules on sun exposed areas.  - Dome shaped bright red papules on the trunk and extremities.   - Multiple regular brown pigmented macules and papules with normal symmetric reticular network on the trunk and extremities.   - Waxy stuck on tan to brown papules on the trunk and extremities  - Well healed flap on the central back from prior melanoma wide local excision  - On the right upper back, there is a 1.5 x 0.5 cm brown patch with slightly asymmetric reticular network and white scar like areas centrally  - No other lesions of concern on areas examined.             Impression/Plan:    1.  History of melanoma, BD 3.5mm, Stage IIB (cT3b, cN0, cM0)   S/p WLE with keystone flap and negative left axillary sentinel lymph node biopsy (7/18/2019) with Gwyn Raman and Tate.     Recommend sunscreens SPF #30 or greater, protective clothing and avoidance of tanning  beds.    ABCDs of melanoma were discussed and self skin checks were advised.     Continue q3 monthly skin checks for 2 years; then q6 monthly for 3 years; then yearly for life.    2.  Multiple clinically benign lesions: solar lentigines, nevi    Recommend sunscreens SPF #30 or greater, protective clothing and avoidance of tanning beds.    ABCDs of melanoma were discussed and self skin checks were advised.     3.   Benign findings including: seborrheic keratoses, cherry angioma    No further intervention required. Patient to report changes.     Patient reassured of the benign nature of these lesions.    4.   NUB, right upper back. ddx atypical nevus r/o melanoma      Shave biopsy:  After discussion of benefits and risks including but not limited to bleeding/bruising, pain/swelling, infection, scar, incomplete removal, nerve damage/numbness, recurrence, and non-diagnostic biopsy, written consent, verbal consent and photographs were obtained. Time-out was performed. The area was cleaned with isopropyl alcohol. 0.5mL of 1% lidocaine with epinephrine was injected to obtain adequate anesthesia of the lesion on the right upper back. A shave biopsy was performed. Hemostasis was achieved with aluminium chloride. Vaseline and a sterile dressing were applied. The patient tolerated the procedure and no complications were noted. The patient was provided with verbal and written post care instructions.    Photos taken for clinical surveillance       Follow-up in 3 months for a FBSE with paco King for new or changing lesions.     Staff Involved:  Scribe/Staff    Scribe Disclosure  I, Mariposa Alfaro, am serving as a scribe to document services personally performed by Dr. Santino Kimball MD, based on data collection and the provider's statements to me.     Provider Disclosure:   The documentation recorded by the scribe accurately reflects the services I personally performed and the decisions made by me.    Santino Kimball,  MD    Department of Dermatology  [facility]: Phone: [phone], Fax:[phone]  [facility]: Phone: [phone] Fax: [phone]

## 2020-03-04 NOTE — LETTER
3/4/2020       RE: Nancy Bronson  [address] [facility] [address]     Dear Colleague,    Thank you for referring your patient, Nancy Bronson, to the [facility] DERMATOLOGIC SURGERY at [facility]. Please see a copy of my visit note below.    DERMATOLOGIC EXCISION SURGERY PROCEDURE NOTE     Dermatology Surgery Clinic  [facility]  Clinics and [facility] Center  [address] [place] [address]    Dermatology Problem List:  0. NUB, R upper back; s/p Bx 3/4/20 DDx atypical nevus r/o melanoma  1. History of melanoma, BD 3.5 with ulceration and 10 mitoses/1mm2, Stage IIB (cT3b, cN0, cM0)   - s/p WLE with keystone flap and negative left axillary sentinel lymph node biopsy (7/18/2019) with Gwyn Raman and Tate   2. Moderately atypical nevi x2, s/p biopsy 9/10/19 with pigment recurrence 12/2019 of the medial left upper back and mid upper back   - s/p excision 3/4/20    Date: March 4, 2020     NAME OF PROCEDURE: Excision with complex linear closure  Staff surgeon: Wesley Michel MD  Fellow: Gómez Villalpando MD  PRE-OPERATIVE DIAGNOSIS:  Atypical nevus  POST-OPERATIVE DIAGNOSIS: Same   FINAL EXCISION SIZE(EXCISION DEFECT SIZE): 0.7 cm, with 3.0 mm margin   FINAL REPAIR LENGTH: 2.3 cm   INDICATIONS: This patient presented with a 0.4 cm atypical nevus of the L upper back medial. Excision was indicated.   We discussed the principles of treatment and most likely complications including bleeding, infection, scarring, alteration in skin color and sensation, wound dehiscence,muscle weakness in the area, or recurrence of the lesion or disease. We reviewed that on occasion, after healing, a secondary procedure or revision may be recommended in order to obtain the best cosmetic or functional result.     PROCEDURE: The patient was taken to the operative suite. Time-out was performed. The treatment area was anesthetized with 1% lidocaine and epinephrine (1:100,000). The  area was washed with Hibiclens, rinsed with saline and draped with sterile towels. The lesion was delineated and excised down to deep subcutaneous fat in an elliptical manner. Hemostasis was obtained by electrocoagulation.     REPAIR: A complex layered linear closure was selected as the procedure which would maximally preserve both function and cosmesis and for the following reasons: Extensive undermining (equal to or greater than the maximum perpendicular width of the defect), exposure of underlying structure (bone, cartilage, tendon, named neurovascular), free margin involvement (helical rim, vermillion border, nostril rim), and/or placement of retention sutures.     The patient was taken to the operative suite and placed supine on the operating room table. The defect was identified. Appropriate markings were made with a marking pen to plan the repair. The area was infiltrated with Lidocaine 1% with epi 1:100,000 and prepped with Hibiclens and draped with sterile towels.     The wound was debeveled and undermined widely. Cones were excised within relaxed skin tension lines on both sides of the defect. Hemostasis was obtained using electrocoagulation. Subcutaneous tissues were then approximated using 4-0 Monocryl buried vertical mattress sutures. The wound edges were then approximated additional 4-0 Monocryl buried sutures were placed in a similar fashion where needed. Percutaneous simple running 5-0 fast absorbing gut sutures were carefully placed for maximum eversion and meticulous approximation.    The final wound length was 2.3 cm. A total of 6.0 ml of anesthesia was administered for all surgical sites. Estimated blood loss was less than 10 ml for all surgical sites. A sterile pressure dressing was applied and wound care instructions, with a written handout, were given. The patient was discharged from the Dermatologic Surgery Center alert and ambulatory.    Follow up as needed.       Staff Involved:    Scribe  Disclosure  I, Phylicia Valverde, am serving as a scribe to document services personally performed by Dr. Wesley Michel, based on data collection and the provider's statements to me.       Attending attestation:  I was present for key elements of the procedure and immediately available for all other portions of the procedure.  I have reviewed the note and edited it as necessary.    Wesley Mcihel M.D.  Professor  Director of Dermatologic Surgery  Department of Dermatology  [facility]    Dermatology Surgery Clinic  [facility] [place] [address]              DERMATOLOGIC EXCISION SURGERY PROCEDURE NOTE     Dermatology Surgery Clinic  [place] [id number]    Dermatology Problem List:  0. NUB, R upper back; s/p Bx 3/4/20 DDx atypical nevus r/o melanoma  1. History of melanoma, BD 3.5 with ulceration and 10 mitoses/1mm2, Stage IIB (cT3b, cN0, cM0)   - s/p WLE with keystone flap and negative left axillary sentinel lymph node biopsy (7/18/2019) with Gwyn Raman and Tate   2. Moderately atypical nevi x2, s/p biopsy 9/10/19 with pigment recurrence 12/2019 of the medial left upper back and mid upper back   - s/p excision 3/4/20    Date: March 4, 2020     NAME OF PROCEDURE: Excision with complex linear closure  Staff surgeon: Wesley Michel MD  Fellow: Gómez Villalpando MD  PRE-OPERATIVE DIAGNOSIS:  Atypical nevus  POST-OPERATIVE DIAGNOSIS: Same   FINAL EXCISION SIZE(EXCISION DEFECT SIZE): 0.7 cm, with 3.0 mm margin   FINAL REPAIR LENGTH: 2.5 cm   INDICATIONS: This patient presented with a 0.4 cm atypical nevus of the upper mid-back. Excision was indicated.   We discussed the principles of treatment and most likely complications including bleeding, infection, scarring, alteration in skin color and sensation, wound dehiscence,muscle weakness in the area, or recurrence of the lesion or  disease. We reviewed that on occasion, after healing, a secondary procedure or revision may be recommended in order to obtain the best cosmetic or functional result.     PROCEDURE: The patient was taken to the operative suite. Time-out was performed. The treatment area was anesthetized with 1% lidocaine and epinephrine (1:100,000). The area was washed with Hibiclens, rinsed with saline and draped with sterile towels. The lesion was delineated and excised down to deep subcutaneous fat in an elliptical manner. Hemostasis was obtained by electrocoagulation.     REPAIR: A complex layered linear closure was selected as the procedure which would maximally preserve both function and cosmesis and for the following reasons: Extensive undermining (equal to or greater than the maximum perpendicular width of the defect), exposure of underlying structure (bone, cartilage, tendon, named neurovascular), free margin involvement (helical rim, vermillion border, nostril rim), and/or placement of retention sutures.     The patient was taken to the operative suite and placed supine on the operating room table. The defect was identified. Appropriate markings were made with a marking pen to plan the repair. The area was infiltrated with Lidocaine 1% with epi 1:100,000 and prepped with Hibiclens and draped with sterile towels.     The wound was debeveled and undermined widely. Cones were excised within relaxed skin tension lines on both sides of the defect. Hemostasis was obtained using electrocoagulation. Subcutaneous tissues were then approximated using 4-0 Monocryl buried vertical mattress sutures. The wound edges were then approximated additional 4-0 Monocryl buried sutures were placed in a similar fashion where needed. Percutaneous simple running 5-0 fast absorbing gut sutures were carefully placed for maximum eversion and meticulous approximation.    The final wound length was 2.5 cm. A total of 1.5 ml of anesthesia was administered  for all surgical sites. Estimated blood loss was less than 10 ml for all surgical sites. A sterile pressure dressing was applied and wound care instructions, with a written handout, were given. The patient was discharged from the Dermatologic Surgery Center alert and ambulatory.    Follow up as needed.       Staff Involved:    Scribe Disclosure  I, Phylicia Abram, am serving as a scribe to document services personally performed by Dr. Wesley Michel, based on data collection and the provider's statements to me.       Attending attestation:  I was present for key elements of the procedure and immediately available for all other portions of the procedure.  I have reviewed the note and edited it as necessary.    Wesley Michel M.D.  Professor  Director of Dermatologic Surgery  Department of Dermatology  [facility]    Dermatology Surgery Clinic  [facility]  [address] [place], [place] [id number]

## 2020-03-04 NOTE — TELEPHONE ENCOUNTER
Pt called back and I let the call center know if she could come in at 10:30AM for her excision that would give Dr. Michel time to finish before her 11:30AM appointment with Dr. Kimball. If she is unable to come in at 10:30 that's OK, then Dr. Michel will see her at 1PM as scheduled. Call center went ahead and passed the message along.   YOANNA Merritt

## 2020-03-04 NOTE — TELEPHONE ENCOUNTER
Called to see if pt could come in sooner per Dr. Michel. Left message to call back if interested.

## 2020-03-04 NOTE — PROGRESS NOTES
DERMATOLOGIC EXCISION SURGERY PROCEDURE NOTE     Dermatology Surgery Clinic  [facility]  [address] [place] [id number]    Dermatology Problem List:  0. NUB, R upper back; s/p Bx 3/4/20 DDx atypical nevus r/o melanoma  1. History of melanoma, BD 3.5 with ulceration and 10 mitoses/1mm2, Stage IIB (cT3b, cN0, cM0)   - s/p WLE with keystone flap and negative left axillary sentinel lymph node biopsy (7/18/2019) with Gwyn Raman and Tate   2. Moderately atypical nevi x2, s/p biopsy 9/10/19 with pigment recurrence 12/2019 of the medial left upper back and mid upper back   - s/p excision 3/4/20    Date: March 4, 2020     NAME OF PROCEDURE: Excision with complex linear closure  Staff surgeon: Wesley Michel MD  Fellow: Gómez Villalpando MD  PRE-OPERATIVE DIAGNOSIS:  Atypical nevus  POST-OPERATIVE DIAGNOSIS: Same   FINAL EXCISION SIZE(EXCISION DEFECT SIZE): 0.7 cm, with 3.0 mm margin   FINAL REPAIR LENGTH: 2.5 cm   INDICATIONS: This patient presented with a 0.4 cm atypical nevus of the upper mid-back. Excision was indicated.   We discussed the principles of treatment and most likely complications including bleeding, infection, scarring, alteration in skin color and sensation, wound dehiscence,muscle weakness in the area, or recurrence of the lesion or disease. We reviewed that on occasion, after healing, a secondary procedure or revision may be recommended in order to obtain the best cosmetic or functional result.     PROCEDURE: The patient was taken to the operative suite. Time-out was performed. The treatment area was anesthetized with 1% lidocaine and epinephrine (1:100,000). The area was washed with Hibiclens, rinsed with saline and draped with sterile towels. The lesion was delineated and excised down to deep subcutaneous fat in an elliptical manner. Hemostasis was obtained by electrocoagulation.     REPAIR: A complex layered linear closure was selected as the  procedure which would maximally preserve both function and cosmesis and for the following reasons: Extensive undermining (equal to or greater than the maximum perpendicular width of the defect), exposure of underlying structure (bone, cartilage, tendon, named neurovascular), free margin involvement (helical rim, vermillion border, nostril rim), and/or placement of retention sutures.     The patient was taken to the operative suite and placed supine on the operating room table. The defect was identified. Appropriate markings were made with a marking pen to plan the repair. The area was infiltrated with Lidocaine 1% with epi 1:100,000 and prepped with Hibiclens and draped with sterile towels.     The wound was debeveled and undermined widely. Cones were excised within relaxed skin tension lines on both sides of the defect. Hemostasis was obtained using electrocoagulation. Subcutaneous tissues were then approximated using 4-0 Monocryl buried vertical mattress sutures. The wound edges were then approximated additional 4-0 Monocryl buried sutures were placed in a similar fashion where needed. Percutaneous simple running 5-0 fast absorbing gut sutures were carefully placed for maximum eversion and meticulous approximation.    The final wound length was 2.5 cm. A total of 1.5 ml of anesthesia was administered for all surgical sites. Estimated blood loss was less than 10 ml for all surgical sites. A sterile pressure dressing was applied and wound care instructions, with a written handout, were given. The patient was discharged from the Dermatologic Surgery Center alert and ambulatory.    Follow up as needed.       Staff Involved:    Scribe Disclosure  CHRIS, Phylicia Valverde, am serving as a scribe to document services personally performed by Dr. Wesley Michel, based on data collection and the provider's statements to me.       Attending attestation:  CHRIS was present for key elements of the procedure and immediately available for all other  portions of the procedure.  I have reviewed the note and edited it as necessary.    Wesley Michel M.D.  Professor  Director of Dermatologic Surgery  Department of Dermatology  [facility]    Dermatology Surgery Clinic  [facility] [place] [id number]

## 2020-03-04 NOTE — PATIENT INSTRUCTIONS

## 2020-03-04 NOTE — PATIENT INSTRUCTIONS

## 2020-03-04 NOTE — NURSING NOTE
Dermatology Rooming Note    Nancy Bronson's goals for this visit include:   Chief Complaint   Patient presents with     Skin Check     skin check - no new concerns      Carlyn Gomez, EMT

## 2020-03-11 LAB — COPATH REPORT: NORMAL

## 2020-03-12 LAB — COPATH REPORT: NORMAL

## 2020-03-12 NOTE — RESULT ENCOUNTER NOTE
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 We should have her follow-up in 3 months as planned.     Thanks!    Santino Kimball MD  Pronouns: he/him/his    Department of Dermatology  [facility]: Phone: [phone], Fax:[phone]  [facility]: Phone: [phone] Fax: [phone]

## 2020-05-01 ENCOUNTER — VIRTUAL VISIT (OUTPATIENT)
Dept: ONCOLOGY | Facility: CLINIC | Age: 50
End: 2020-05-01
Attending: SURGERY
Payer: COMMERCIAL

## 2020-05-01 DIAGNOSIS — C43.59 MALIGNANT MELANOMA OF TORSO EXCLUDING BREAST (H): Primary | ICD-10-CM

## 2020-05-01 NOTE — PROGRESS NOTES
"Nancy Bronson is a 50 year old female who is being evaluated via a billable video visit.      The patient has been notified of following:     \"This video visit will be conducted via a call between you and your physician/provider. We have found that certain health care needs can be provided without the need for an in-person physical exam.  This service lets us provide the care you need with a video conversation.  If a prescription is necessary we can send it directly to your pharmacy.  If lab work is needed we can place an order for that and you can then stop by our lab to have the test done at a later time.    Video visits are billed at different rates depending on your insurance coverage.  Please reach out to your insurance provider with any questions.    If during the course of the call the physician/provider feels a video visit is not appropriate, you will not be charged for this service.\"    Patient has given verbal consent for Video visit? Yes    How would you like to obtain your AVS? MyChart    Patient would like the video invitation sent by: Text to cell phone: [phone]    Will anyone else be joining your video visit? No      No concerns. No refills needed.    Peggy Maldonado CMA ([facility])      Video-Visit Details    Type of service:  Video Visit    Video Start Time: 10:22 AM  Video End Time: 10:31 AM    Originating Location (pt. Location): Home    Distant Location (provider location):  [facility]     Platform used for Video Visit: [employer]obi[employer]      The above were completed by the medical assistant for the visit.    FOLLOW-UP  May 1, 2020    Nancy Bronson is a 50 year old female who is videoing in for follow-up for     Cancer Staging  Malignant melanoma of torso excluding breast (H)  Staging form: Melanoma of the Skin, AJCC 8th Edition  - Clinical: Stage IIB (cT3b, cN0, cM0) - Unsigned  - Pathologic: Stage IIB (pT3b, pN0, cM0) - Signed by Susan Raman MD on 7/30/2019      Treatment to " date:  1. Wide local excision of left back melanoma, left axillary sentinel lymph node biopsy (7/18/2019)    HPI:    Since her last visit, has been doing well. She denies any headaches, dizziness, vision changes, abdominal pain, bowel habit changes, rectal bleeding, melena, chest pain, shortness of breath, or unintentional weight loss.  She has not noted any masses in either axilla or groin bilaterally. She has good range of motion of her shoulders bilaterally and denies any upper extremity swelling.    No changes to her overall health.  No family history changes    Her last skin check was performed by Dr Art on 3/4/2020. Her next check is planned for June 2020 with Dr Richardson.     Physical Exam  Constitutional:       Appearance: Normal appearance. She is well-developed.      Comments: The rest of a comprehensive physical examination is deferred due to public health emergency video visit restrictions.   Pulmonary:      Effort: No respiratory distress.   Neurological:      Mental Status: She is alert and oriented to person, place, and time.        INVESTIGATIONS:    None    Assessment/Plan:  There are no diagnoses linked to this encounter.    ASSESSMENT:  Nancy Bronson is a 50 year old female with truncal cutaneous melanoma, 10 months s/p surgery.    She is doing well without signs or symptoms of recurrence.    We reviewed that surveillance involves a history and physical exam every 6 months for the first 5 years, then annually, with imaging studies only indicated if symptoms arise. We reviewed the signs and symptoms that could arise in the setting of recurrent locoregional or metastatic disease. In addition, she will need to undergo quarterly dermatologic full skin survey and evaluation given that patients with a diagnosis of melanoma are at risk of recurrence (local and distant) and of subsequent de sherrill melanoma.  I also reviewed the importance of protection from sun exposure, including wearing long  sleeves, hats, etc and also the use of sunscreen with SPF of at least 35, with frequent re-applications.     We discussed that typically at a melanoma follow up appointment, a physical exam is performed. Given that she has no concerns and we are minimizing face-to-face visits during the COVID-19 pandemic, the physical exam is deferred today.  I will see her in follow up in 6 months. We reviewed concerning signs and symptoms for recurrence and she knows to call to be seen if she develops any of them.     All of the above was discussed and all questions were answered.    PLAN:  1. Follow up with me in 6 months  2. Follow up with Dr Richardson as scheduled in June 2020    Susan Raman MD MSc RUSTC FACS    Division of Surgical Oncology  [facility]

## 2020-06-12 ENCOUNTER — OFFICE VISIT (OUTPATIENT)
Dept: SURGERY | Facility: CLINIC | Age: 50
End: 2020-06-12
Attending: DERMATOLOGY
Payer: COMMERCIAL

## 2020-06-12 VITALS
SYSTOLIC BLOOD PRESSURE: 116 MMHG | WEIGHT: 218.3 LBS | BODY MASS INDEX: 35.26 KG/M2 | RESPIRATION RATE: 16 BRPM | HEART RATE: 66 BPM | DIASTOLIC BLOOD PRESSURE: 80 MMHG

## 2020-06-12 DIAGNOSIS — C43.59 MALIGNANT MELANOMA OF TORSO EXCLUDING BREAST (H): Primary | ICD-10-CM

## 2020-06-12 DIAGNOSIS — L91.8 SKIN TAG: ICD-10-CM

## 2020-06-12 DIAGNOSIS — D22.9 MULTIPLE BENIGN NEVI: ICD-10-CM

## 2020-06-12 DIAGNOSIS — Z86.018 HISTORY OF DYSPLASTIC NEVUS: ICD-10-CM

## 2020-06-12 DIAGNOSIS — L81.4 SOLAR LENTIGO: ICD-10-CM

## 2020-06-12 DIAGNOSIS — L82.1 SEBORRHEIC KERATOSIS: ICD-10-CM

## 2020-06-12 DIAGNOSIS — D18.01 CHERRY ANGIOMA: ICD-10-CM

## 2020-06-12 DIAGNOSIS — L57.8 SUN-DAMAGED SKIN: ICD-10-CM

## 2020-06-12 DIAGNOSIS — D22.9 RECURRENT NEVUS: ICD-10-CM

## 2020-06-12 PROCEDURE — G0463 HOSPITAL OUTPT CLINIC VISIT: HCPCS | Mod: ZF

## 2020-06-12 RX ORDER — LIDOCAINE HYDROCHLORIDE AND EPINEPHRINE 10; 10 MG/ML; UG/ML
1.5 INJECTION, SOLUTION INFILTRATION; PERINEURAL ONCE
Status: DISCONTINUED | OUTPATIENT
Start: 2020-06-12 | End: 2020-06-12

## 2020-06-12 ASSESSMENT — PAIN SCALES - GENERAL: PAINLEVEL: NO PAIN (0)

## 2020-06-12 NOTE — NURSING NOTE
"Oncology Rooming Note    June 12, 2020 4:13 PM   Nancy Bronson is a 50 year old female who presents for:    Chief Complaint   Patient presents with     New Patient     NEW PT; MELANOMA SKIN CHECK; VITALS TAKEN      Initial Vitals: /80   Pulse 66   Resp 16   Wt 99 kg (218 lb 4.8 oz)   LMP 12/02/2012   BMI 35.26 kg/m   Estimated body mass index is 35.26 kg/m  as calculated from the following:    Height as of 3/3/20: 1.676 m (5' 5.98\").    Weight as of this encounter: 99 kg (218 lb 4.8 oz). Body surface area is 2.15 meters squared.  No Pain (0) Comment: Data Unavailable   Patient's last menstrual period was 12/02/2012.  Allergies reviewed: Yes  Medications reviewed: Yes    Medications: Medication refills not needed today.  Pharmacy name entered into [employer]:    [facility] [place], [place] - [address]  [id number] - [place], [place] - [address]  [address]    Clinical concerns: No new concerns today  Dr Richardson was notified.      Liz Adams              "

## 2020-06-12 NOTE — LETTER
6/12/2020         RE: Nancy Bronson  [address] [facility] [address]        Dear Colleague,    Thank you for referring your patient, Nancy Bronson, to the [facility]. Please see a copy of my visit note below.    [facility] Dermatology Note    Dermatology Problem List:  PLC/Melanoma Clinic Patient  Skin checks q3 months through 7/2021  1. History of melanoma, left back, Stage IIB (cT3b, cN0, cM0)   -Initial biopsy on 6/2019: BD at least 3.5 with ulceration and 10 mitoses/mm2  - s/p WLE with keystone flap and negative left axillary sentinel lymph node biopsy (7/18/2019) with Gwyn Raman and Tate   2. History of dysplastic nevi  -Moderately atypical nevi x2 of the medial left upper back and mid upper back, s/p biopsy 9/10/19 with pigment recurrence 12/2019, s/p excision 3/4/20  -*Nevus with congenital features and junctional atypia, right upper back, s/p biopsy 3/4/20, pigment recurrence noted 6/12/20, plan for excision    Encounter Date: Jun 12, 2020    CC:  Chief Complaint   Patient presents with     New Patient     NEW PT; MELANOMA SKIN CHECK; VITALS TAKEN        History of Present Illness:  Ms. Nancy Bronson is a 50 year old female with history of melanoma and atypical nevi who presents for a skin check. Elpidio noticed the melanoma on her back when she had bleeding after showering and drying with a towel. She then sought care for it. She also has history of atypical moles. No history of other types of skin cancer. Today, she notes no specific skin concerns. She tries to do self exams, but notes difficulty due to the large number of moles she has. She has not noticed any new or changing moles. No tender or bleeding skin lesions. She gets an occasional rash in the skin folds, uses [employer] when this happens with improvement. No other significant skin history.     No other concerns addressed today.    Past Medical History:   Patient Active Problem List    Diagnosis     Snoring     S/P hysterectomy     Anemia     Malignant melanoma of torso excluding breast (H)     Past Medical History:   Diagnosis Date     Anemia      NO ACTIVE PROBLEMS      Past Surgical History:   Procedure Laterality Date     ABDOMEN SURGERY  2012    hysterectomy     BIOPSY NODE SENTINEL Left 7/18/2019    Procedure: [place] Lymph Node Mapping and Biopsy;  Surgeon: Susan Raman MD;  Location:  OR     C C-SEC ONLY,PREV C-SEC  2001,2004    2     C NONSPECIFIC PROCEDURE  1975    tonsils     C NONSPECIFIC PROCEDURE  1985    jaw surgery     DAVINCI HYSTERECTOMY SUPRACERVICAL  12/4/2012    benign pathProcedure: DAVINCI HYSTERECTOMY SUPRACERVICAL;  Davinci Supracervical Hysterectomy with Bilateral Salpingectomy, Cystoscopy ;  Surgeon: Selene Cardenas DO;  Location:  OR     ENT SURGERY  1975     EXCISE LESION BACK N/A 7/18/2019    Procedure: Wide Local Excision of Back Melanoma;  Surgeon: Susan Raman MD;  Location:  OR     HEAD & NECK SURGERY  1986    jaw surgery     LAPAROSCOPIC TUBAL LIGATION  2007     PROCEDURE PLACEHOLDER PLASTIC N/A 7/18/2019    Procedure: Back Reconstruction With Local Tissue Rearrangement;  Surgeon: DIPAK Ybarra MD;  Location:  OR       Social History:  Patient reports that she has quit smoking. She has never used smokeless tobacco. She reports current alcohol use. She reports that she does not use drugs. Works as an  for the [employer]. She has two children.    Family History:  Father had unknown types of skin cancer. Patient and her father are no longer in contact for her to ask.  No family history of pancreatic cancer.     Medications:  Current Outpatient Medications   Medication Sig Dispense Refill     fluticasone (FLONASE) 50 MCG/ACT spray Spray 1 spray into both nostrils 2 times daily as needed for rhinitis or allergies 16 g 1     hydrocortisone (WESTCORT) 0.2 % external cream Apply topically 2 times daily 15  g 2       No Known Allergies    Review of Systems:  -Constitutional: Patient is otherwise feeling well, in usual state of health. No fevers, chills, weight loss.   -Skin: As above in HPI. No additional skin concerns.    Answers for HPI/ROS submitted by the patient on 6/12/2020   General Symptoms: No  Skin Symptoms: No  HENT Symptoms: No  EYE SYMPTOMS: No  HEART SYMPTOMS: No  LUNG SYMPTOMS: No  INTESTINAL SYMPTOMS: No  URINARY SYMPTOMS: No  GYNECOLOGIC SYMPTOMS: No  BREAST SYMPTOMS: No  SKELETAL SYMPTOMS: No  BLOOD SYMPTOMS: No  NERVOUS SYSTEM SYMPTOMS: No  MENTAL HEALTH SYMPTOMS: No    Physical exam:  Vitals: /80   Pulse 66   Resp 16   Wt 99 kg (218 lb 4.8 oz)   LMP 12/02/2012   BMI 35.26 kg/m    GEN: This is a well developed, well-nourished female in no acute distress, in a pleasant mood.    LYMPH NODES: No submandibular, cervical, supraclavicular, axillary, or inguinal lymphadenopathy palpable on examination.   SKIN: Full skin, which includes the head/face, both arms, chest, back, abdomen,both legs, genitalia and/or groin buttocks, digits and/or nails, was examined.  - Archuleta Type II  - Well healed flap on the upper to mid back. No pigment recurrence or nodularity.   - Scattered brown macules on sun exposed areas, particularly the shoulders.  - On the right mid back, there is a well healed 1-2cm scar with 1mm dot of pigment centrally, consistent with recurrent nevus. This is the area of previous congenital and atypical nevus.  - Skin tags in axillae and at neck.  - There are dome shaped bright red papules on the face, scalp, and trunk.   - Multiple regular brown pigmented macules and papules. Some are larger than 6mm in size. Some have borders taht feather off in the periphery. None have any significant atypia on dermoscopic examination today.   - There are waxy stuck on tan to brown papules on the trunk.  - Pink erythema in the infrapanus fold.   - No other lesions of concern on areas examined.      Impression/Plan:    Melanoma, stage IIB, left back. No evidence of recurrence. Discussed risk of melanoma recurrence (with local or distant disease) and risk of additional primary melanomas. Explained routine schedule for follow up examinations in office and need for self skin checks monthly. Discussed the use of total body photography (TBP) and digital dermoscopic documentation (DDD) to catch melanomas earlier. We will review this more in the future when we are able to offer this service again.   - ABCDs of melanoma were discussed and self skin checks were advised.   - Sun precaution was advised including the use of sunscreens of SPF 30 or higher, sun protective clothing, and avoidance of tanning beds.  - Recommended yearly dental, gyn, and ophthalmology examinations.  - Recommended first degree relatives get yearly skin exams as well.  - Next skin check in 3 months.    2.   Sun damaged skin with solar lentigines   - Recommend sunscreens SPF #30 or greater, protective clothing and avoidance of tanning beds.    3.   Benign findings including: seborrheic keratoses, cherry angioma, skin tags   - No further intervention required. Patient to report changes.    - Patient reassured of the benign nature of these lesions.    4.   Recurrent dysplastic and congenital nevus, right mid back: Discussed today that pigment recurrence indicates cells still present. It is difficult to monitor these cells from changes because of the scar from biopsy. Recommended excision to make sure any atypical cells are fully removed. Patient noted understanding. Referral to derm surg placed.    5.   Multiple clinically benign nevi, no significant atypia today   - No further intervention required. Patient to report changes.    - Patient reassured of the benign nature of these lesions.    Follow-up in 3 months for skin check, earlier for new or changing lesions.     Staff Involved:  Staff only    Melisa Richardson MD  Assistant  Professor  Department of Dermatology  [facility]: Phone: [phone], Fax:[phone]  [facility]: Phone: [phone], Fax: [phone]

## 2020-06-12 NOTE — PROGRESS NOTES
[facility] Dermatology Note    Dermatology Problem List:  PLC/Melanoma Clinic Patient  Skin checks q3 months through 7/2021  1. History of melanoma, left back, Stage IIB (cT3b, cN0, cM0)   -Initial biopsy on 6/2019: BD at least 3.5 with ulceration and 10 mitoses/mm2  - s/p WLE with keystone flap and negative left axillary sentinel lymph node biopsy (7/18/2019) with Gwyn Raman and Tate   2. History of dysplastic nevi  -Moderately atypical nevi x2 of the medial left upper back and mid upper back, s/p biopsy 9/10/19 with pigment recurrence 12/2019, s/p excision 3/4/20  -*Nevus with congenital features and junctional atypia, right upper back, s/p biopsy 3/4/20, pigment recurrence noted 6/12/20, plan for excision    Encounter Date: Jun 12, 2020    CC:  Chief Complaint   Patient presents with     New Patient     NEW PT; MELANOMA SKIN CHECK; VITALS TAKEN        History of Present Illness:  Ms. Nancy Bronson is a 50 year old female with history of melanoma and atypical nevi who presents for a skin check. Elpidio noticed the melanoma on her back when she had bleeding after showering and drying with a towel. She then sought care for it. She also has history of atypical moles. No history of other types of skin cancer. Today, she notes no specific skin concerns. She tries to do self exams, but notes difficulty due to the large number of moles she has. She has not noticed any new or changing moles. No tender or bleeding skin lesions. She gets an occasional rash in the skin folds, uses [employer] when this happens with improvement. No other significant skin history.     No other concerns addressed today.    Past Medical History:   Patient Active Problem List   Diagnosis     Snoring     S/P hysterectomy     Anemia     Malignant melanoma of torso excluding breast (H)     Past Medical History:   Diagnosis Date     Anemia      NO ACTIVE PROBLEMS      Past Surgical History:   Procedure Laterality Date     ABDOMEN  SURGERY  2012    hysterectomy     BIOPSY NODE SENTINEL Left 7/18/2019    Procedure: [place] Lymph Node Mapping and Biopsy;  Surgeon: Susan Raman MD;  [facility]: [facility] OR     C C-SEC ONLY,PREV C-SEC  2001,2004    2     C NONSPECIFIC PROCEDURE  1975    tonsils     C NONSPECIFIC PROCEDURE  1985    jaw surgery     DAVINCI HYSTERECTOMY SUPRACERVICAL  12/4/2012    benign pathProcedure: DAVINCI HYSTERECTOMY SUPRACERVICAL;  Davinci Supracervical Hysterectomy with Bilateral Salpingectomy, Cystoscopy ;  Surgeon: Selene Cardenas DO;  Location:  OR     ENT SURGERY  1975     EXCISE LESION BACK N/A 7/18/2019    Procedure: Wide Local Excision of Back Melanoma;  Surgeon: Susan Raman MD;  Location: [facility] OR     HEAD & NECK SURGERY  1986    jaw surgery     LAPAROSCOPIC TUBAL LIGATION  2007     PROCEDURE PLACEHOLDER PLASTIC N/A 7/18/2019    Procedure: Back Reconstruction With Local Tissue Rearrangement;  Surgeon: DIPAK Ybarra MD;  Location:  OR       Social History:  Patient reports that she has quit smoking. She has never used smokeless tobacco. She reports current alcohol use. She reports that she does not use drugs. Works as an  for the [employer]. She has two children.    Family History:  Father had unknown types of skin cancer. Patient and her father are no longer in contact for her to ask.  No family history of pancreatic cancer.     Medications:  Current Outpatient Medications   Medication Sig Dispense Refill     fluticasone (FLONASE) 50 MCG/ACT spray Spray 1 spray into both nostrils 2 times daily as needed for rhinitis or allergies 16 g 1     hydrocortisone (WESTCORT) 0.2 % external cream Apply topically 2 times daily 15 g 2       No Known Allergies    Review of Systems:  -Constitutional: Patient is otherwise feeling well, in usual state of health. No fevers, chills, weight loss.   -Skin: As above in HPI. No additional skin concerns.    Answers for HPI/ROS submitted by  the patient on 6/12/2020   General Symptoms: No  Skin Symptoms: No  HENT Symptoms: No  EYE SYMPTOMS: No  HEART SYMPTOMS: No  LUNG SYMPTOMS: No  INTESTINAL SYMPTOMS: No  URINARY SYMPTOMS: No  GYNECOLOGIC SYMPTOMS: No  BREAST SYMPTOMS: No  SKELETAL SYMPTOMS: No  BLOOD SYMPTOMS: No  NERVOUS SYSTEM SYMPTOMS: No  MENTAL HEALTH SYMPTOMS: No    Physical exam:  Vitals: /80   Pulse 66   Resp 16   Wt 99 kg (218 lb 4.8 oz)   LMP 12/02/2012   BMI 35.26 kg/m    GEN: This is a well developed, well-nourished female in no acute distress, in a pleasant mood.    LYMPH NODES: No submandibular, cervical, supraclavicular, axillary, or inguinal lymphadenopathy palpable on examination.   SKIN: Full skin, which includes the head/face, both arms, chest, back, abdomen,both legs, genitalia and/or groin buttocks, digits and/or nails, was examined.  - Archuleta Type II  - Well healed flap on the upper to mid back. No pigment recurrence or nodularity.   - Scattered brown macules on sun exposed areas, particularly the shoulders.  - On the right mid back, there is a well healed 1-2cm scar with 1mm dot of pigment centrally, consistent with recurrent nevus. This is the area of previous congenital and atypical nevus.  - Skin tags in axillae and at neck.  - There are dome shaped bright red papules on the face, scalp, and trunk.   - Multiple regular brown pigmented macules and papules. Some are larger than 6mm in size. Some have borders taht feather off in the periphery. None have any significant atypia on dermoscopic examination today.   - There are waxy stuck on tan to brown papules on the trunk.  - Pink erythema in the infrapanus fold.   - No other lesions of concern on areas examined.     Impression/Plan:    Melanoma, stage IIB, left back. No evidence of recurrence. Discussed risk of melanoma recurrence (with local or distant disease) and risk of additional primary melanomas. Explained routine schedule for follow up examinations in  office and need for self skin checks monthly. Discussed the use of total body photography (TBP) and digital dermoscopic documentation (DDD) to catch melanomas earlier. We will review this more in the future when we are able to offer this service again.   - ABCDs of melanoma were discussed and self skin checks were advised.   - Sun precaution was advised including the use of sunscreens of SPF 30 or higher, sun protective clothing, and avoidance of tanning beds.  - Recommended yearly dental, gyn, and ophthalmology examinations.  - Recommended first degree relatives get yearly skin exams as well.  - Next skin check in 3 months.    2.   Sun damaged skin with solar lentigines   - Recommend sunscreens SPF #30 or greater, protective clothing and avoidance of tanning beds.    3.   Benign findings including: seborrheic keratoses, cherry angioma, skin tags   - No further intervention required. Patient to report changes.    - Patient reassured of the benign nature of these lesions.    4.   Recurrent dysplastic and congenital nevus, right mid back: Discussed today that pigment recurrence indicates cells still present. It is difficult to monitor these cells from changes because of the scar from biopsy. Recommended excision to make sure any atypical cells are fully removed. Patient noted understanding. Referral to derm surg placed.    5.   Multiple clinically benign nevi, no significant atypia today   - No further intervention required. Patient to report changes.    - Patient reassured of the benign nature of these lesions.    Follow-up in 3 months for skin check, earlier for new or changing lesions.     Staff Involved:  Staff only    Melisa Richardson MD    Department of Dermatology  [facility]: Phone: [phone], Fax:[phone]  [facility]: Phone: [phone], Fax: [phone]

## 2020-06-18 ENCOUNTER — TELEPHONE (OUTPATIENT)
Dept: SLEEP MEDICINE | Facility: CLINIC | Age: 50
End: 2020-06-18

## 2020-06-18 DIAGNOSIS — G47.33 OSA (OBSTRUCTIVE SLEEP APNEA): Primary | ICD-10-CM

## 2020-06-18 NOTE — TELEPHONE ENCOUNTER
Patient called regarding scheduling of sleep study.    She states that during her consult she was given the option of in lab or HST.    She now prefers a HST.    Contacted provider to order an HST.

## 2020-07-20 ENCOUNTER — DOCUMENTATION ONLY (OUTPATIENT)
Dept: CARE COORDINATION | Facility: CLINIC | Age: 50
End: 2020-07-20

## 2020-07-23 ENCOUNTER — OFFICE VISIT (OUTPATIENT)
Dept: DERMATOLOGY | Facility: CLINIC | Age: 50
End: 2020-07-23
Payer: COMMERCIAL

## 2020-07-23 VITALS — HEART RATE: 102 BPM | DIASTOLIC BLOOD PRESSURE: 83 MMHG | SYSTOLIC BLOOD PRESSURE: 116 MMHG

## 2020-07-23 DIAGNOSIS — D22.9 ATYPICAL NEVUS: Primary | ICD-10-CM

## 2020-07-23 ASSESSMENT — PAIN SCALES - GENERAL
PAINLEVEL: NO PAIN (0)
PAINLEVEL: MILD PAIN (2)

## 2020-07-23 NOTE — LETTER
7/23/2020       RE: Nancy Bronson  [address] [facility] [address]     Dear Colleague,    Thank you for referring your patient, Nancy Bronson, to the [facility] DERMATOLOGIC SURGERY at [facility]. Please see a copy of my visit note below.    DERMATOLOGY EXCISION PROCEDURE NOTE    Dermatology Problem List:  Melanoma back  Multiple atypcial nevi    NAME OF PROCEDURE: Excision intermediate layered linear closure  Staff surgeon: Marilu  Resident: none  Scrub Nurse: Lane    PRE-OPERATIVE DIAGNOSIS:  Dysplastic nevus/recurrent  POST-OPERATIVE DIAGNOSIS: Same   LOCATION: mid back  FINAL EXCISION SIZE(DEFECT SIZE): 1.5 cm  MARGIN: 0.2 cm  FINAL REPAIR LENGTH: 4.5 cm   ANESTHESIA: 3.0 cc 1% lidocaine with 1:100,000 epinephrine           INDICATIONS: This patient presented with a 1.5cm recurrent nevus. Excision was indicated. We discussed the principles of treatment and most likely complications including scarring, bleeding, infection, incomplete excision, wound dehiscence, pain, nerve damage, and recurrence. Informed consent was obtained and the patient underwent the procedure as follows:    PROCEDURE: The patient was taken to the operative suite. Time-out was performed.  The treatment area was anesthetized with 1% lidocaine with epinephrine. The area was prepped with Chlorhexidine and rinsed with sterile saline and draped with sterile towels. The lesion was delineated and excised down to subcutaneous fat in a elliptical manner. Hemostasis was obtained by electrocoagulation.     REPAIR: An intermediate layered linear closure was selected as the procedure which would maximally preserve both function and cosmesis.    After the excision of the tumor, the area was carefully  undermined. Hemostasis was obtained with electrocoagulation.  Closure was oriented so that the wound was in the patient's natural skin tension lines. The subcutaneous and dermal layers were then closed with  3.0 vicryl sutures. The epidermis was then carefully approximated along the length of the wound using 4.0 Monocryl running subcuticular sutures.     Estimated blood loss was less than 10 ml for all surgical sites. A sterile pressure dressing was applied and wound care instructions, with a written handout, were given. The patient was discharged from the [facility] alert and ambulatory.        Dr. Michel was immediately available for the entire surgery and was physicially present for the key portions of the procedure.    Anatomic Pathology Results: pending    Clinical Follow-Up: PRN with Derm surg.  As scheduled for skin checks with Dr. THOMSON    Staff Involved:  Staff Only    Wesley Michel MD

## 2020-07-23 NOTE — NURSING NOTE
Chief Complaint   Patient presents with     Derm Problem     patient is here today for excison on right mid back.      Nesha MAHONEY CMA

## 2020-07-23 NOTE — PATIENT INSTRUCTIONS

## 2020-07-23 NOTE — NURSING NOTE
Vaseline and telfa applied. Pressure dressing applied with dental rolls and tape. No bleeding noted. Denies pain.  Wound care instructions reviewed. Supplies given. All questions answered.     Dianne Sherman LPN

## 2020-07-26 LAB — COPATH REPORT: NORMAL

## 2020-07-27 NOTE — PROGRESS NOTES
DERMATOLOGY EXCISION PROCEDURE NOTE    Dermatology Problem List:  Melanoma back  Multiple atypcial nevi    NAME OF PROCEDURE: Excision intermediate layered linear closure  Staff surgeon: Marilu  Resident: none  Scrub Nurse: Lane    PRE-OPERATIVE DIAGNOSIS:  Dysplastic nevus/recurrent  POST-OPERATIVE DIAGNOSIS: Same   LOCATION: mid back  FINAL EXCISION SIZE(DEFECT SIZE): 1.5 cm  MARGIN: 0.2 cm  FINAL REPAIR LENGTH: 4.5 cm   ANESTHESIA: 3.0 cc 1% lidocaine with 1:100,000 epinephrine           INDICATIONS: This patient presented with a 1.5cm recurrent nevus. Excision was indicated. We discussed the principles of treatment and most likely complications including scarring, bleeding, infection, incomplete excision, wound dehiscence, pain, nerve damage, and recurrence. Informed consent was obtained and the patient underwent the procedure as follows:    PROCEDURE: The patient was taken to the operative suite. Time-out was performed.  The treatment area was anesthetized with 1% lidocaine with epinephrine. The area was prepped with Chlorhexidine and rinsed with sterile saline and draped with sterile towels. The lesion was delineated and excised down to subcutaneous fat in a elliptical manner. Hemostasis was obtained by electrocoagulation.     REPAIR: An intermediate layered linear closure was selected as the procedure which would maximally preserve both function and cosmesis.    After the excision of the tumor, the area was carefully  undermined. Hemostasis was obtained with electrocoagulation.  Closure was oriented so that the wound was in the patient's natural skin tension lines. The subcutaneous and dermal layers were then closed with 3.0 vicryl sutures. The epidermis was then carefully approximated along the length of the wound using 4.0 Monocryl running subcuticular sutures.     Estimated blood loss was less than 10 ml for all surgical sites. A sterile pressure dressing was applied and wound care instructions, with a  written handout, were given. The patient was discharged from the [facility] alert and ambulatory.        Dr. Michel was immediately available for the entire surgery and was physicially present for the key portions of the procedure.    Anatomic Pathology Results: pending    Clinical Follow-Up: PRN with Derm surg.  As scheduled for skin checks with Dr. THOMSON    Staff Involved:  Staff Only    Wesley Michel MD

## 2020-09-17 RX ORDER — LIDOCAINE HYDROCHLORIDE AND EPINEPHRINE 10; 10 MG/ML; UG/ML
1.5 INJECTION, SOLUTION INFILTRATION; PERINEURAL ONCE
Status: DISCONTINUED | OUTPATIENT
Start: 2020-09-18 | End: 2020-09-18

## 2020-09-17 NOTE — PROGRESS NOTES
[facility] Dermatology Note    Dermatology Problem List:  PLC/Melanoma Clinic Patient  TBP when available  Skin checks q3 months through 7/2021  1. History of melanoma, left back, Stage IIB (cT3b, cN0, cM0)   -Initial biopsy on 6/2019: BD at least 3.5 with ulceration and 10 mitoses/mm2  - s/p WLE with keystone flap and negative left axillary sentinel lymph node biopsy (7/18/2019) with Gwyn Raman and Tate   2. History of dysplastic nevi  -Moderately atypical nevi x2 of the medial left upper back and mid upper back, s/p biopsy 9/10/19 with pigment recurrence 12/2019, s/p excision 3/4/20  -Nevus with congenital features and junctional atypia, right upper back, s/p biopsy 3/4/20, pigment recurrence noted 6/12/20, re-excised 7/23/20    Encounter Date: Sep 18, 2020    CC:  Chief Complaint   Patient presents with     Oncology Clinic Visit     MELANOMA        History of Present Illness:  Ms. Nancy Bronson is a 50 year old female with history of melanoma and atypical nevi who presents for a skin check.    The patient was last seen on 6/12/20 for a skin check. At that time, pigment recurrence was noted at a mole previously removed on the right upper back. She has since had this area excised.     Today, the patient reports concerns about no specific skin lesions. She does try to watch her skin for changes, but it's hard on her back. Otherwise denies any tender, nonhealing, or bleeding skin lesions.    She denies fevers, chills, weight loss, cough, shortness of breath.    No other concerns addressed today.      Past Medical History:   Patient Active Problem List   Diagnosis     Snoring     S/P hysterectomy     Anemia     Malignant melanoma of torso excluding breast (H)     Past Medical History:   Diagnosis Date     Anemia      NO ACTIVE PROBLEMS      Past Surgical History:   Procedure Laterality Date     ABDOMEN SURGERY  2012    hysterectomy     BIOPSY NODE SENTINEL Left 7/18/2019    Procedure: [place]  Lymph Node Mapping and Biopsy;  Surgeon: Susan Raman MD;  [facility]     C C-SEC ONLY,PREV C-SEC  2001,2004    2     C NONSPECIFIC PROCEDURE  1975    tonsils     C NONSPECIFIC PROCEDURE  1985    jaw surgery     DAVINCI HYSTERECTOMY SUPRACERVICAL  12/4/2012    benign pathProcedure: DAVINCI HYSTERECTOMY SUPRACERVICAL;  Davinci Supracervical Hysterectomy with Bilateral Salpingectomy, Cystoscopy ;  Surgeon: Selene Cardenas DO;  Location:  OR     ENT SURGERY  1975     EXCISE LESION BACK N/A 7/18/2019    Procedure: Wide Local Excision of Back Melanoma;  Surgeon: Susan Raman MD;  Location:  OR     HEAD & NECK SURGERY  1986    jaw surgery     LAPAROSCOPIC TUBAL LIGATION  2007     PROCEDURE PLACEHOLDER PLASTIC N/A 7/18/2019    Procedure: Back Reconstruction With Local Tissue Rearrangement;  Surgeon: DIPAK Ybarra MD;  Location:  OR       Social History:  Patient reports that she has quit smoking. She has never used smokeless tobacco. She reports current alcohol use. She reports that she does not use drugs. 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 She has two children (one in high school, one in college).  Reviewed and left in chart for clinician convenience.       Family History:  Father had unknown types of skin cancer. Patient and her father are no longer in contact for her to ask.  No family history of pancreatic cancer.   Reviewed and left in chart for clinician convenience.       Medications:  Current Outpatient Medications   Medication Sig Dispense Refill     fluticasone (FLONASE) 50 MCG/ACT spray Spray 1 spray into both nostrils 2 times daily as needed for rhinitis or allergies 16 g 1     hydrocortisone (WESTCORT) 0.2 % external cream Apply topically 2 times daily 15 g 2       No Known Allergies    Review of Systems:  -Constitutional: Patient is otherwise feeling well, in usual state of health. No  fevers, chills, weight loss.   -Skin: As above in HPI. No additional skin concerns.    Answers for HPI/ROS submitted by the patient on 9/14/2020   General Symptoms: No  Skin Symptoms: No  HENT Symptoms: No  EYE SYMPTOMS: No  HEART SYMPTOMS: No  LUNG SYMPTOMS: No  INTESTINAL SYMPTOMS: No  URINARY SYMPTOMS: No  GYNECOLOGIC SYMPTOMS: No  BREAST SYMPTOMS: No  SKELETAL SYMPTOMS: No  BLOOD SYMPTOMS: No  NERVOUS SYSTEM SYMPTOMS: No  MENTAL HEALTH SYMPTOMS: No    Physical exam:  Vitals: /86   Pulse 95   Resp 18   Wt 98.6 kg (217 lb 4.8 oz)   LMP 12/02/2012   SpO2 97%   BMI 35.09 kg/m    GEN: This is a well developed, well-nourished female in no acute distress, in a pleasant mood.    LYMPH NODES: No submandibular, cervical, supraclavicular, axillary, or inguinal lymphadenopathy palpable on examination.   SKIN: Total skin, which includes the head/face, both arms, chest, back, abdomen,both legs, buttocks, digits and/or nails, was examined.  - Archuleta Type II  - Well healed flap on the upper to mid back. No pigment recurrence or nodularity.   - Scattered brown macules on sun exposed areas, particularly the shoulders.  - Three linear scars with no pigment recurrence on the back (one on the right, two on the left).  - Skin tags in axillae and at neck.  - There are dome shaped bright red papules on the face, scalp, and trunk.   - Multiple regular brown pigmented macules and papules. Some are larger than 6mm in size. Some have borders taht feather off in the periphery. None have any significant atypia on dermoscopic examination today.   - There are waxy stuck on tan to brown papules on the trunk.  - No other lesions of concern on areas examined.     Impression/Plan:    Melanoma, stage IIB, left back. No evidence of recurrence. Discussed risk of melanoma recurrence (with local or distant disease) and risk of additional primary melanomas. Explained routine schedule for follow up examinations in office and need for  self skin checks monthly. Discussed the use of total body photography (TBP) and digital dermoscopic documentation (DDD) to catch melanomas earlier. We will review this more in the future when we are able to offer this service again.   - ABCDs of melanoma were discussed and self skin checks were advised.   - Sun precaution was advised including the use of sunscreens of SPF 30 or higher, sun protective clothing, and avoidance of tanning beds.  - Recommended yearly dental, gyn, and ophthalmology examinations.  - Recommended first degree relatives get yearly skin exams as well.  - Next skin check in 3 months.    2.   Sun damaged skin with solar lentigines   - Recommend sunscreens SPF #30 or greater, protective clothing and avoidance of tanning beds.    3.   Benign findings including: seborrheic keratoses, cherry angioma, skin tags   - No further intervention required. Patient to report changes.    - Patient reassured of the benign nature of these lesions.    5.   Multiple clinically benign nevi, no significant atypia today   - No further intervention required. Patient to report changes.    - Patient reassured of the benign nature of these lesions.    Follow-up in 3 months for skin check, earlier for new or changing lesions.     Staff Involved:  Staff only    Melisa Richardson MD    Department of Dermatology  [facility]: Phone: [phone], Fax:[phone]  [facility]: Phone: [phone], Fax: [phone]

## 2020-09-18 ENCOUNTER — OFFICE VISIT (OUTPATIENT)
Dept: SURGERY | Facility: CLINIC | Age: 50
End: 2020-09-18
Attending: DERMATOLOGY
Payer: COMMERCIAL

## 2020-09-18 VITALS
BODY MASS INDEX: 35.09 KG/M2 | DIASTOLIC BLOOD PRESSURE: 86 MMHG | RESPIRATION RATE: 18 BRPM | OXYGEN SATURATION: 97 % | SYSTOLIC BLOOD PRESSURE: 126 MMHG | WEIGHT: 217.3 LBS | HEART RATE: 95 BPM

## 2020-09-18 DIAGNOSIS — L81.4 SOLAR LENTIGO: ICD-10-CM

## 2020-09-18 DIAGNOSIS — D18.01 CHERRY ANGIOMA: ICD-10-CM

## 2020-09-18 DIAGNOSIS — L57.8 SUN-DAMAGED SKIN: ICD-10-CM

## 2020-09-18 DIAGNOSIS — Z85.820 HISTORY OF MELANOMA: Primary | ICD-10-CM

## 2020-09-18 DIAGNOSIS — L82.1 SEBORRHEIC KERATOSIS: ICD-10-CM

## 2020-09-18 DIAGNOSIS — D22.9 MULTIPLE BENIGN NEVI: ICD-10-CM

## 2020-09-18 PROCEDURE — G0463 HOSPITAL OUTPT CLINIC VISIT: HCPCS | Mod: ZF

## 2020-09-18 ASSESSMENT — PAIN SCALES - GENERAL: PAINLEVEL: NO PAIN (0)

## 2020-09-18 NOTE — LETTER
9/18/2020         RE: Nancy Bronson  [address] [facility] [address]        Dear Colleague,    Thank you for referring your patient, Nancy Bronson, to the [facility]. Please see a copy of my visit note below.    [facility] Dermatology Note    Dermatology Problem List:  PLC/Melanoma Clinic Patient  TBP when available  Skin checks q3 months through 7/2021  1. History of melanoma, left back, Stage IIB (cT3b, cN0, cM0)   -Initial biopsy on 6/2019: BD at least 3.5 with ulceration and 10 mitoses/mm2  - s/p WLE with keystone flap and negative left axillary sentinel lymph node biopsy (7/18/2019) with Gwyn Raman and Tate   2. History of dysplastic nevi  -Moderately atypical nevi x2 of the medial left upper back and mid upper back, s/p biopsy 9/10/19 with pigment recurrence 12/2019, s/p excision 3/4/20  -Nevus with congenital features and junctional atypia, right upper back, s/p biopsy 3/4/20, pigment recurrence noted 6/12/20, re-excised 7/23/20    Encounter Date: Sep 18, 2020    CC:  Chief Complaint   Patient presents with     Oncology Clinic Visit     MELANOMA        History of Present Illness:  Ms. Nancy Bronson is a 50 year old female with history of melanoma and atypical nevi who presents for a skin check.    The patient was last seen on 6/12/20 for a skin check. At that time, pigment recurrence was noted at a mole previously removed on the right upper back. She has since had this area excised.     Today, the patient reports concerns about no specific skin lesions. She does try to watch her skin for changes, but it's hard on her back. Otherwise denies any tender, nonhealing, or bleeding skin lesions.    She denies fevers, chills, weight loss, cough, shortness of breath.    No other concerns addressed today.      Past Medical History:   Patient Active Problem List   Diagnosis     Snoring     S/P hysterectomy     Anemia     Malignant melanoma of torso excluding  breast (H)     Past Medical History:   Diagnosis Date     Anemia      NO ACTIVE PROBLEMS      Past Surgical History:   Procedure Laterality Date     ABDOMEN SURGERY  2012    hysterectomy     BIOPSY NODE SENTINEL Left 7/18/2019    Procedure: [place] Lymph Node Mapping and Biopsy;  Surgeon: Susan Raman MD;  Location:  OR     C C-SEC ONLY,PREV C-SEC  2001,2004    2     C NONSPECIFIC PROCEDURE  1975    tonsils     C NONSPECIFIC PROCEDURE  1985    jaw surgery     DAVINCI HYSTERECTOMY SUPRACERVICAL  12/4/2012    benign pathProcedure: DAVINCI HYSTERECTOMY SUPRACERVICAL;  Davinci Supracervical Hysterectomy with Bilateral Salpingectomy, Cystoscopy ;  Surgeon: Selene Cardenas DO;  Location:  OR     ENT SURGERY  1975     EXCISE LESION BACK N/A 7/18/2019    Procedure: Wide Local Excision of Back Melanoma;  Surgeon: Susan Raman MD;  Location:  OR     HEAD & NECK SURGERY  1986    jaw surgery     LAPAROSCOPIC TUBAL LIGATION  2007     PROCEDURE PLACEHOLDER PLASTIC N/A 7/18/2019    Procedure: Back Reconstruction With Local Tissue Rearrangement;  Surgeon: DIPAK Ybarra MD;  Location:  OR       Social History:  Patient reports that she has quit smoking. She has never used smokeless tobacco. She reports current alcohol use. She reports that she does not use drugs. 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 She has two children (one in high school, one in college).  Reviewed and left in chart for clinician convenience.       Family History:  Father had unknown types of skin cancer. Patient and her father are no longer in contact for her to ask.  No family history of pancreatic cancer.   Reviewed and left in chart for clinician convenience.       Medications:  Current Outpatient Medications   Medication Sig Dispense Refill     fluticasone (FLONASE) 50 MCG/ACT spray Spray 1 spray into both nostrils 2 times daily as needed  for rhinitis or allergies 16 g 1     hydrocortisone (WESTCORT) 0.2 % external cream Apply topically 2 times daily 15 g 2       No Known Allergies    Review of Systems:  -Constitutional: Patient is otherwise feeling well, in usual state of health. No fevers, chills, weight loss.   -Skin: As above in HPI. No additional skin concerns.    Answers for HPI/ROS submitted by the patient on 9/14/2020   General Symptoms: No  Skin Symptoms: No  HENT Symptoms: No  EYE SYMPTOMS: No  HEART SYMPTOMS: No  LUNG SYMPTOMS: No  INTESTINAL SYMPTOMS: No  URINARY SYMPTOMS: No  GYNECOLOGIC SYMPTOMS: No  BREAST SYMPTOMS: No  SKELETAL SYMPTOMS: No  BLOOD SYMPTOMS: No  NERVOUS SYSTEM SYMPTOMS: No  MENTAL HEALTH SYMPTOMS: No    Physical exam:  Vitals: /86   Pulse 95   Resp 18   Wt 98.6 kg (217 lb 4.8 oz)   LMP 12/02/2012   SpO2 97%   BMI 35.09 kg/m    GEN: This is a well developed, well-nourished female in no acute distress, in a pleasant mood.    LYMPH NODES: No submandibular, cervical, supraclavicular, axillary, or inguinal lymphadenopathy palpable on examination.   SKIN: Total skin, which includes the head/face, both arms, chest, back, abdomen,both legs, buttocks, digits and/or nails, was examined.  - Archuleta Type II  - Well healed flap on the upper to mid back. No pigment recurrence or nodularity.   - Scattered brown macules on sun exposed areas, particularly the shoulders.  - Three linear scars with no pigment recurrence on the back (one on the right, two on the left).  - Skin tags in axillae and at neck.  - There are dome shaped bright red papules on the face, scalp, and trunk.   - Multiple regular brown pigmented macules and papules. Some are larger than 6mm in size. Some have borders taht feather off in the periphery. None have any significant atypia on dermoscopic examination today.   - There are waxy stuck on tan to brown papules on the trunk.  - No other lesions of concern on areas examined.      Impression/Plan:    Melanoma, stage IIB, left back. No evidence of recurrence. Discussed risk of melanoma recurrence (with local or distant disease) and risk of additional primary melanomas. Explained routine schedule for follow up examinations in office and need for self skin checks monthly. Discussed the use of total body photography (TBP) and digital dermoscopic documentation (DDD) to catch melanomas earlier. We will review this more in the future when we are able to offer this service again.   - ABCDs of melanoma were discussed and self skin checks were advised.   - Sun precaution was advised including the use of sunscreens of SPF 30 or higher, sun protective clothing, and avoidance of tanning beds.  - Recommended yearly dental, gyn, and ophthalmology examinations.  - Recommended first degree relatives get yearly skin exams as well.  - Next skin check in 3 months.    2.   Sun damaged skin with solar lentigines   - Recommend sunscreens SPF #30 or greater, protective clothing and avoidance of tanning beds.    3.   Benign findings including: seborrheic keratoses, cherry angioma, skin tags   - No further intervention required. Patient to report changes.    - Patient reassured of the benign nature of these lesions.    5.   Multiple clinically benign nevi, no significant atypia today   - No further intervention required. Patient to report changes.    - Patient reassured of the benign nature of these lesions.    Follow-up in 3 months for skin check, earlier for new or changing lesions.     Staff Involved:  Staff only    Melisa Richardson MD    Department of Dermatology  [facility]: Phone: [phone], Fax:[phone]  [facility] [place]: Phone: [phone], Fax: [phone]

## 2020-11-05 ENCOUNTER — ONCOLOGY VISIT (OUTPATIENT)
Dept: ONCOLOGY | Facility: CLINIC | Age: 50
End: 2020-11-05
Attending: SURGERY
Payer: COMMERCIAL

## 2020-11-05 VITALS
RESPIRATION RATE: 18 BRPM | TEMPERATURE: 97.9 F | HEIGHT: 67 IN | DIASTOLIC BLOOD PRESSURE: 88 MMHG | WEIGHT: 218.8 LBS | OXYGEN SATURATION: 97 % | BODY MASS INDEX: 34.34 KG/M2 | SYSTOLIC BLOOD PRESSURE: 127 MMHG | HEART RATE: 105 BPM

## 2020-11-05 DIAGNOSIS — C43.59 MALIGNANT MELANOMA OF TORSO EXCLUDING BREAST (H): Primary | ICD-10-CM

## 2020-11-05 PROCEDURE — 99213 OFFICE O/P EST LOW 20 MIN: CPT | Performed by: SURGERY

## 2020-11-05 PROCEDURE — G0463 HOSPITAL OUTPT CLINIC VISIT: HCPCS

## 2020-11-05 ASSESSMENT — PAIN SCALES - GENERAL: PAINLEVEL: NO PAIN (0)

## 2020-11-05 ASSESSMENT — MIFFLIN-ST. JEOR: SCORE: 1637.71

## 2020-11-05 NOTE — LETTER
"    11/5/2020         RE: Nancy Bronson  [address] [facility] [address]        Dear Colleague,    Thank you for referring your patient, Nancy Bronson, to the [facility]. Please see a copy of my visit note below.    FOLLOW-UP  Nov 5, 2020    Nancy Bronson is a 50 year old female who returns for follow-up for     Cancer Staging  Malignant melanoma of torso excluding breast (H)  Staging form: Melanoma of the Skin, AJCC 8th Edition  - Clinical: Stage IIB (cT3b, cN0, cM0) - Unsigned  - Pathologic: Stage IIB (pT3b, pN0, cM0) - Signed by Susan Raman MD on 7/30/2019    Treatment to date:  1. Wide local excision of left back melanoma, left axillary sentinel lymph node biopsy (7/18/2019)    HPI:    Since her last visit, she has been doing well. She denies any headaches, dizziness, vision changes, abdominal pain, bowel habit changes, rectal bleeding, melena, chest pain, shortness of breath, or unintentional weight loss.  She has not noted any masses in either axilla bilaterally. She has good range of motion of her shoulders bilaterally and denies any upper extremity swelling.    She did have dysplastic nevi that have been excised on the back in March 2020 and July 2020.    Her last skin check was performed by Dr Richardson on 9/18/2020.  No concerning lesions were found. Her next check is planned for 12/11/2020.     /88   Pulse 105   Temp 97.9  F (36.6  C) (Oral)   Resp 18   Ht 1.69 m (5' 6.54\")   Wt 99.2 kg (218 lb 12.8 oz)   LMP 12/02/2012   SpO2 97%   BMI 34.75 kg/m     Physical Exam  Constitutional:       Appearance: She is well-developed.   Abdominal:      General: There is no distension.      Palpations: Abdomen is soft. There is no fluid wave, hepatomegaly or mass.      Tenderness: There is no abdominal tenderness.   Lymphadenopathy:      Cervical: No cervical adenopathy.      Right cervical: No superficial, deep or posterior cervical " adenopathy.     Left cervical: No superficial, deep or posterior cervical adenopathy.      Upper Body:      Right upper body: No supraclavicular, axillary or pectoral adenopathy.      Left upper body: No supraclavicular, axillary or pectoral adenopathy.      Lower Body: No right inguinal adenopathy. No left inguinal adenopathy.      Comments: No lymphedema in bilateral upper extremities. Left axilla incision unremarkable.   Skin:     General: Skin is warm and dry.                 INVESTIGATIONS:  None    ASSESSMENT:    Nancy Bronson is a 50 year old female with truncal cutaneous melanoma, 15 months s/p surgery.    She is doing well without signs or symptoms of recurrence.    We reviewed that surveillance involves a history and physical exam every 6 months for the first 5 years, then annually, with imaging studies only indicated if symptoms arise. We reviewed the signs and symptoms that could arise in the setting of recurrent locoregional or metastatic disease. In addition, she will need to undergo quarterly dermatologic full skin survey and evaluation given that patients with a diagnosis of melanoma are at risk of recurrence (local and distant) and of subsequent de sherrill melanoma.  I also reviewed the importance of protection from sun exposure, including wearing long sleeves, hats, etc and also the use of sunscreen with SPF of at least 35, with frequent re-applications.     Total time spent with the patient was 15 minutes, of which more than half was counseling.     PLAN:  1. Follow up with me in 6 months  2. Follow up with Dr Richardson as scheduled in December 2020.    Susan Raman MD MSc [facility] FACS    Division of Surgical Oncology  [facility]

## 2020-11-05 NOTE — PROGRESS NOTES
"FOLLOW-UP  Nov 5, 2020    Nancy Bronson is a 50 year old female who returns for follow-up for     Cancer Staging  Malignant melanoma of torso excluding breast (H)  Staging form: Melanoma of the Skin, AJCC 8th Edition  - Clinical: Stage IIB (cT3b, cN0, cM0) - Unsigned  - Pathologic: Stage IIB (pT3b, pN0, cM0) - Signed by Susan Raman MD on 7/30/2019    Treatment to date:  1. Wide local excision of left back melanoma, left axillary sentinel lymph node biopsy (7/18/2019)    HPI:    Since her last visit, she has been doing well. She denies any headaches, dizziness, vision changes, abdominal pain, bowel habit changes, rectal bleeding, melena, chest pain, shortness of breath, or unintentional weight loss.  She has not noted any masses in either axilla bilaterally. She has good range of motion of her shoulders bilaterally and denies any upper extremity swelling.    She did have dysplastic nevi that have been excised on the back in March 2020 and July 2020.    Her last skin check was performed by Dr Richardson on 9/18/2020.  No concerning lesions were found. Her next check is planned for 12/11/2020.     /88   Pulse 105   Temp 97.9  F (36.6  C) (Oral)   Resp 18   Ht 1.69 m (5' 6.54\")   Wt 99.2 kg (218 lb 12.8 oz)   LMP 12/02/2012   SpO2 97%   BMI 34.75 kg/m     Physical Exam  Constitutional:       Appearance: She is well-developed.   Abdominal:      General: There is no distension.      Palpations: Abdomen is soft. There is no fluid wave, hepatomegaly or mass.      Tenderness: There is no abdominal tenderness.   Lymphadenopathy:      Cervical: No cervical adenopathy.      Right cervical: No superficial, deep or posterior cervical adenopathy.     Left cervical: No superficial, deep or posterior cervical adenopathy.      Upper Body:      Right upper body: No supraclavicular, axillary or pectoral adenopathy.      Left upper body: No supraclavicular, axillary or pectoral adenopathy.      Lower Body: No " right inguinal adenopathy. No left inguinal adenopathy.      Comments: No lymphedema in bilateral upper extremities. Left axilla incision unremarkable.   Skin:     General: Skin is warm and dry.                 INVESTIGATIONS:  None    ASSESSMENT:    Nancy Bronson is a 50 year old female with truncal cutaneous melanoma, 15 months s/p surgery.    She is doing well without signs or symptoms of recurrence.    We reviewed that surveillance involves a history and physical exam every 6 months for the first 5 years, then annually, with imaging studies only indicated if symptoms arise. We reviewed the signs and symptoms that could arise in the setting of recurrent locoregional or metastatic disease. In addition, she will need to undergo quarterly dermatologic full skin survey and evaluation given that patients with a diagnosis of melanoma are at risk of recurrence (local and distant) and of subsequent de sherrill melanoma.  I also reviewed the importance of protection from sun exposure, including wearing long sleeves, hats, etc and also the use of sunscreen with SPF of at least 35, with frequent re-applications.     Total time spent with the patient was 15 minutes, of which more than half was counseling.     PLAN:  1. Follow up with me in 6 months  2. Follow up with Dr Richardson as scheduled in December 2020.    Susan Raman MD MSc [facility] FACS    Division of Surgical Oncology  [facility]

## 2020-11-05 NOTE — NURSING NOTE
"Oncology Rooming Note    November 5, 2020 10:05 AM   Nancy Bronson is a 50 year old female who presents for:    Chief Complaint   Patient presents with     Oncology Clinic Visit     6 MONTH F/U; Malignant melanoma of torso excluding breast     Initial Vitals: /88   Pulse 105   Temp 97.9  F (36.6  C) (Oral)   Resp 18   Ht 1.69 m (5' 6.54\")   Wt 99.2 kg (218 lb 12.8 oz)   LMP 12/02/2012   SpO2 97%   BMI 34.75 kg/m   Estimated body mass index is 34.75 kg/m  as calculated from the following:    Height as of this encounter: 1.69 m (5' 6.54\").    Weight as of this encounter: 99.2 kg (218 lb 12.8 oz). Body surface area is 2.16 meters squared.  No Pain (0) Comment: Data Unavailable   Patient's last menstrual period was 12/02/2012.  Allergies reviewed: Yes  Medications reviewed: Yes    Medications: Medication refills not needed today.  Pharmacy name entered into [facility]:    [facility] [place], [place] - [address]  [id number] - [place], [place] - [address]  [address]    Clinical concerns: No new concerns today.       Padma Elizalde MA            "

## 2020-11-16 ENCOUNTER — HEALTH MAINTENANCE LETTER (OUTPATIENT)
Age: 50
End: 2020-11-16

## 2020-12-10 RX ORDER — LIDOCAINE HYDROCHLORIDE AND EPINEPHRINE 10; 10 MG/ML; UG/ML
1.5 INJECTION, SOLUTION INFILTRATION; PERINEURAL ONCE
Status: DISCONTINUED | OUTPATIENT
Start: 2020-12-11 | End: 2020-12-11

## 2020-12-10 NOTE — PROGRESS NOTES
[facility] Dermatology Note    Dermatology Problem List:  PLC/Melanoma Clinic Patient  TBP when available  Skin checks q3 months through 7/2021  1. History of melanoma, left back, Stage IIB (cT3b, cN0, cM0)   -Initial biopsy on 6/2019: BD at least 3.5 with ulceration and 10 mitoses/mm2  - s/p WLE with keystone flap and negative left axillary sentinel lymph node biopsy (7/18/2019) with Gwyn Raman and Tate   2. History of dysplastic nevi  -Moderately atypical nevi x2 of the medial left upper back and mid upper back, s/p biopsy 9/10/19 with pigment recurrence 12/2019, s/p excision 3/4/20  -Nevus with congenital features and junctional atypia, right upper back, s/p biopsy 3/4/20, pigment recurrence noted 6/12/20, re-excised 7/23/20    Encounter Date: Dec 11, 2020    CC:  Chief Complaint   Patient presents with     Oncology Clinic Visit     MELANOMA        History of Present Illness:  Ms. Nancy Bronson is a 50 year old female with history of melanoma and atypical nevi who presents for a skin check.    The patient was last seen on 9/18/20 for a skin check. At that time, only benign skin lesions were identified.    Today, the patient reports concerns about no specific skin lesions. She does try to watch her skin for changes, but it's hard on her back. Otherwise denies any tender, nonhealing, or bleeding skin lesions.    She denies fevers, chills, weight loss, cough, shortness of breath.    No other concerns addressed today.      Past Medical History:   Patient Active Problem List   Diagnosis     Snoring     S/P hysterectomy     Anemia     Malignant melanoma of torso excluding breast (H)     Past Medical History:   Diagnosis Date     Anemia      NO ACTIVE PROBLEMS      Past Surgical History:   Procedure Laterality Date     ABDOMEN SURGERY  2012    hysterectomy     BIOPSY NODE SENTINEL Left 7/18/2019    Procedure: [place] Lymph Node Mapping and Biopsy;  Surgeon: Susan Raman MD;  Location:   OR     C C-SEC ONLY,PREV C-SEC  2001,2004    2     DAVINCI HYSTERECTOMY SUPRACERVICAL  12/4/2012    benign pathProcedure: DAVINCI HYSTERECTOMY SUPRACERVICAL;  Davinci Supracervical Hysterectomy with Bilateral Salpingectomy, Cystoscopy ;  Surgeon: Selene Cardenas DO;  Location:  OR     ENT SURGERY  1975     EXCISE LESION BACK N/A 7/18/2019    Procedure: Wide Local Excision of Back Melanoma;  Surgeon: Susan Raman MD;  Location:  OR     HEAD & NECK SURGERY  1986    jaw surgery     LAPAROSCOPIC TUBAL LIGATION  2007     PROCEDURE PLACEHOLDER PLASTIC N/A 7/18/2019    Procedure: Back Reconstruction With Local Tissue Rearrangement;  Surgeon: DIPAK Ybarra MD;  Location:  OR     [facility] NONSPECIFIC PROCEDURE  1975    tonsils     [facility] NONSPECIFIC PROCEDURE  1985    jaw surgery       Social History:  Patient reports that she has quit smoking. She has never used smokeless tobacco. She reports current alcohol use. She reports that she does not use drugs. 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 She has two children (one in high school, one in college).  Reviewed and left in chart for clinician convenience.       Family History:  Father had unknown types of skin cancer. Patient and her father are no longer in contact for her to ask.  No family history of pancreatic cancer.   Reviewed and left in chart for clinician convenience.       Medications:  Current Outpatient Medications   Medication Sig Dispense Refill     fluticasone (FLONASE) 50 MCG/ACT spray Spray 1 spray into both nostrils 2 times daily as needed for rhinitis or allergies 16 g 1     hydrocortisone (WESTCORT) 0.2 % external cream Apply topically 2 times daily 15 g 2       No Known Allergies    Review of Systems:  -Constitutional: Patient is otherwise feeling well, in usual state of health. No fevers, chills, weight loss.   -Skin: As above in HPI. No additional skin  "concerns.    Answers for HPI/ROS submitted by the patient on 11/29/2020   General Symptoms: No  Skin Symptoms: No  HENT Symptoms: No  EYE SYMPTOMS: No  HEART SYMPTOMS: No  LUNG SYMPTOMS: No  INTESTINAL SYMPTOMS: No  URINARY SYMPTOMS: No  GYNECOLOGIC SYMPTOMS: No  BREAST SYMPTOMS: No  SKELETAL SYMPTOMS: No  BLOOD SYMPTOMS: No  NERVOUS SYSTEM SYMPTOMS: No  MENTAL HEALTH SYMPTOMS: No      Physical exam:  Vitals: /81   Pulse 94   Resp 16   Ht 1.69 m (5' 6.54\")   Wt 97.6 kg (215 lb 3.2 oz)   LMP 12/02/2012   SpO2 97%   BMI 34.17 kg/m    GEN: This is a well developed, well-nourished female in no acute distress, in a pleasant mood.    LYMPH NODES: No submandibular, cervical, supraclavicular, axillary, or inguinal lymphadenopathy palpable on examination.   SKIN: Total skin, which includes the head/face, both arms, chest, back, abdomen,both legs, buttocks, digits and/or nails, was examined.  - Archuleta Type II  - Well healed flap on the upper to mid back. No pigment recurrence or nodularity.   - Scattered brown macules on sun exposed areas, particularly the shoulders.  - Three linear scars with no pigment recurrence on the back (one on the right, two on the left).  - Skin tags in axillae and at neck.  - Scattered sebaceous hyperplasia on the face.  - There are dome shaped bright red papules on the face, scalp, and trunk.   - Multiple regular brown pigmented macules and papules. Some are larger than 6mm in size but these are uniform. Some have borders that feather off in the periphery. None have significant atypia on dermoscopic examination today.   - There are waxy stuck on tan to brown papules on the trunk.  - No other lesions of concern on areas examined.     Impression/Plan:    Melanoma, stage IIB, left back. No evidence of recurrence. Discussed risk of melanoma recurrence (with local or distant disease) and risk of additional primary melanomas. Explained routine schedule for follow up examinations in " office and need for self skin checks monthly. Discussed the use of total body photography (TBP) and digital dermoscopic documentation (DDD) to catch melanomas earlier in the past. We will review this more in the future when we are able to offer this service again.   - ABCDs of melanoma were discussed and self skin checks were advised.   - Sun precaution was advised including the use of sunscreens of SPF 30 or higher, sun protective clothing, and avoidance of tanning beds.  - Recommended yearly dental, gyn, and ophthalmology examinations.  - Recommended first degree relatives get yearly skin exams as well.  - Next skin check in 3 months.    2.   Sun damaged skin with solar lentigines   - Recommend sunscreens SPF #30 or greater, protective clothing and avoidance of tanning beds.    3.   Benign findings including: seborrheic keratoses, cherry angioma, skin tags, sebaceous hyperplasia   - No further intervention required. Patient to report changes.    - Patient reassured of the benign nature of these lesions.    4.   Multiple clinically benign nevi, no significant atypia today   - No further intervention required. Patient to report changes.    - Patient reassured of the benign nature of these lesions.    5.   History of dysplastic nevi: No evidence of recurrence    Follow-up in 3 months for skin check, earlier for new or changing lesions.     Staff Involved:  Staff only    Melisa Richardson MD    Department of Dermatology  [facility]: Phone: [phone], Fax:[phone]  [facility]: Phone: [phone], Fax: [phone]

## 2020-12-11 ENCOUNTER — OFFICE VISIT (OUTPATIENT)
Dept: SURGERY | Facility: CLINIC | Age: 50
End: 2020-12-11
Attending: DERMATOLOGY
Payer: COMMERCIAL

## 2020-12-11 VITALS
SYSTOLIC BLOOD PRESSURE: 118 MMHG | RESPIRATION RATE: 16 BRPM | DIASTOLIC BLOOD PRESSURE: 81 MMHG | WEIGHT: 215.2 LBS | BODY MASS INDEX: 33.78 KG/M2 | HEIGHT: 67 IN | OXYGEN SATURATION: 97 % | HEART RATE: 94 BPM

## 2020-12-11 DIAGNOSIS — L82.1 SEBORRHEIC KERATOSIS: ICD-10-CM

## 2020-12-11 DIAGNOSIS — L81.4 SOLAR LENTIGO: ICD-10-CM

## 2020-12-11 DIAGNOSIS — L91.8 SKIN TAG: ICD-10-CM

## 2020-12-11 DIAGNOSIS — L57.8 SUN-DAMAGED SKIN: ICD-10-CM

## 2020-12-11 DIAGNOSIS — D18.01 CHERRY ANGIOMA: ICD-10-CM

## 2020-12-11 DIAGNOSIS — Z86.018 HISTORY OF DYSPLASTIC NEVUS: ICD-10-CM

## 2020-12-11 DIAGNOSIS — D22.9 MULTIPLE BENIGN NEVI: ICD-10-CM

## 2020-12-11 DIAGNOSIS — L73.8 SENILE SEBACEOUS GLAND HYPERPLASIA: ICD-10-CM

## 2020-12-11 DIAGNOSIS — Z85.820 HISTORY OF MELANOMA: Primary | ICD-10-CM

## 2020-12-11 PROCEDURE — G0463 HOSPITAL OUTPT CLINIC VISIT: HCPCS

## 2020-12-11 PROCEDURE — 99214 OFFICE O/P EST MOD 30 MIN: CPT | Performed by: DERMATOLOGY

## 2020-12-11 ASSESSMENT — MIFFLIN-ST. JEOR: SCORE: 1621.46

## 2020-12-11 ASSESSMENT — PAIN SCALES - GENERAL: PAINLEVEL: NO PAIN (0)

## 2020-12-11 NOTE — NURSING NOTE
"Oncology Rooming Note    December 11, 2020 4:01 PM   Nancy Bronson is a 50 year old female who presents for:    Chief Complaint   Patient presents with     Oncology Clinic Visit     MELANOMA      Initial Vitals: /81   Pulse 94   Resp 16   Ht 1.69 m (5' 6.54\")   Wt 97.6 kg (215 lb 3.2 oz)   LMP 12/02/2012   SpO2 97%   BMI 34.17 kg/m   Estimated body mass index is 34.17 kg/m  as calculated from the following:    Height as of this encounter: 1.69 m (5' 6.54\").    Weight as of this encounter: 97.6 kg (215 lb 3.2 oz). Body surface area is 2.14 meters squared.  No Pain (0) Comment: Data Unavailable   Patient's last menstrual period was 12/02/2012.  Allergies reviewed: Yes  Medications reviewed: Yes    Medications: Medication refills not needed today.  Pharmacy name entered into AskYou:    [facility] [place], [place] - [address]  [id number] - [place], [place] - [address]    Clinical concerns: No new concerns today  Dr Richardson  was notified.      Liz Adams            "

## 2020-12-11 NOTE — LETTER
12/11/2020         RE: Nancy Bronson  [address] [place] [facility] [address]        Dear Colleague,    Thank you for referring your patient, Nancy Bronson, to the [facility]. Please see a copy of my visit note below.    [facility] Dermatology Note    Dermatology Problem List:  PLC/Melanoma Clinic Patient  TBP when available  Skin checks q3 months through 7/2021  1. History of melanoma, left back, Stage IIB (cT3b, cN0, cM0)   -Initial biopsy on 6/2019: BD at least 3.5 with ulceration and 10 mitoses/mm2  - s/p WLE with keystone flap and negative left axillary sentinel lymph node biopsy (7/18/2019) with Gwyn Raman and Tate   2. History of dysplastic nevi  -Moderately atypical nevi x2 of the medial left upper back and mid upper back, s/p biopsy 9/10/19 with pigment recurrence 12/2019, s/p excision 3/4/20  -Nevus with congenital features and junctional atypia, right upper back, s/p biopsy 3/4/20, pigment recurrence noted 6/12/20, re-excised 7/23/20    Encounter Date: Dec 11, 2020    CC:  Chief Complaint   Patient presents with     Oncology Clinic Visit     MELANOMA        History of Present Illness:  Ms. Nancy Bronson is a 50 year old female with history of melanoma and atypical nevi who presents for a skin check.    The patient was last seen on 9/18/20 for a skin check. At that time, only benign skin lesions were identified.    Today, the patient reports concerns about no specific skin lesions. She does try to watch her skin for changes, but it's hard on her back. Otherwise denies any tender, nonhealing, or bleeding skin lesions.    She denies fevers, chills, weight loss, cough, shortness of breath.    No other concerns addressed today.      Past Medical History:   Patient Active Problem List   Diagnosis     Snoring     S/P hysterectomy     Anemia     Malignant melanoma of torso excluding breast (H)     Past Medical History:   Diagnosis Date     Anemia       NO ACTIVE PROBLEMS      Past Surgical History:   Procedure Laterality Date     ABDOMEN SURGERY  2012    hysterectomy     BIOPSY NODE SENTINEL Left 7/18/2019    Procedure: [place] Lymph Node Mapping and Biopsy;  Surgeon: Susan Raman MD;  [facility]     C C-SEC ONLY,PREV C-SEC  2001,2004    2     DAVINCI HYSTERECTOMY SUPRACERVICAL  12/4/2012    benign pathProcedure: DAVINCI HYSTERECTOMY SUPRACERVICAL;  Davinci Supracervical Hysterectomy with Bilateral Salpingectomy, Cystoscopy ;  Surgeon: Selene Cardenas DO;  Location:  OR     ENT SURGERY  1975     EXCISE LESION BACK N/A 7/18/2019    Procedure: Wide Local Excision of Back Melanoma;  Surgeon: Susan Raman MD;  Location:  OR     HEAD & NECK SURGERY  1986    jaw surgery     LAPAROSCOPIC TUBAL LIGATION  2007     PROCEDURE PLACEHOLDER PLASTIC N/A 7/18/2019    Procedure: Back Reconstruction With Local Tissue Rearrangement;  Surgeon: DIPAK Ybarra MD;  Location:  OR     Z NONSPECIFIC PROCEDURE  1975    tonsils     ZZ NONSPECIFIC PROCEDURE  1985    jaw surgery       Social History:  Patient reports that she has quit smoking. She has never used smokeless tobacco. She reports current alcohol use. She reports that she does not use drugs. 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 She has two children (one in high school, one in college).  Reviewed and left in chart for clinician convenience.       Family History:  Father had unknown types of skin cancer. Patient and her father are no longer in contact for her to ask.  No family history of pancreatic cancer.   Reviewed and left in chart for clinician convenience.       Medications:  Current Outpatient Medications   Medication Sig Dispense Refill     fluticasone (FLONASE) 50 MCG/ACT spray Spray 1 spray into both nostrils 2 times daily as needed for rhinitis or allergies 16 g 1     hydrocortisone (WESTCORT)  "0.2 % external cream Apply topically 2 times daily 15 g 2       No Known Allergies    Review of Systems:  -Constitutional: Patient is otherwise feeling well, in usual state of health. No fevers, chills, weight loss.   -Skin: As above in HPI. No additional skin concerns.    Answers for HPI/ROS submitted by the patient on 11/29/2020   General Symptoms: No  Skin Symptoms: No  HENT Symptoms: No  EYE SYMPTOMS: No  HEART SYMPTOMS: No  LUNG SYMPTOMS: No  INTESTINAL SYMPTOMS: No  URINARY SYMPTOMS: No  GYNECOLOGIC SYMPTOMS: No  BREAST SYMPTOMS: No  SKELETAL SYMPTOMS: No  BLOOD SYMPTOMS: No  NERVOUS SYSTEM SYMPTOMS: No  MENTAL HEALTH SYMPTOMS: No      Physical exam:  Vitals: /81   Pulse 94   Resp 16   Ht 1.69 m (5' 6.54\")   Wt 97.6 kg (215 lb 3.2 oz)   LMP 12/02/2012   SpO2 97%   BMI 34.17 kg/m    GEN: This is a well developed, well-nourished female in no acute distress, in a pleasant mood.    LYMPH NODES: No submandibular, cervical, supraclavicular, axillary, or inguinal lymphadenopathy palpable on examination.   SKIN: Total skin, which includes the head/face, both arms, chest, back, abdomen,both legs, buttocks, digits and/or nails, was examined.  - Archuleta Type II  - Well healed flap on the upper to mid back. No pigment recurrence or nodularity.   - Scattered brown macules on sun exposed areas, particularly the shoulders.  - Three linear scars with no pigment recurrence on the back (one on the right, two on the left).  - Skin tags in axillae and at neck.  - Scattered sebaceous hyperplasia on the face.  - There are dome shaped bright red papules on the face, scalp, and trunk.   - Multiple regular brown pigmented macules and papules. Some are larger than 6mm in size but these are uniform. Some have borders that feather off in the periphery. None have significant atypia on dermoscopic examination today.   - There are waxy stuck on tan to brown papules on the trunk.  - No other lesions of concern on areas " examined.     Impression/Plan:    Melanoma, stage IIB, left back. No evidence of recurrence. Discussed risk of melanoma recurrence (with local or distant disease) and risk of additional primary melanomas. Explained routine schedule for follow up examinations in office and need for self skin checks monthly. Discussed the use of total body photography (TBP) and digital dermoscopic documentation (DDD) to catch melanomas earlier in the past. We will review this more in the future when we are able to offer this service again.   - ABCDs of melanoma were discussed and self skin checks were advised.   - Sun precaution was advised including the use of sunscreens of SPF 30 or higher, sun protective clothing, and avoidance of tanning beds.  - Recommended yearly dental, gyn, and ophthalmology examinations.  - Recommended first degree relatives get yearly skin exams as well.  - Next skin check in 3 months.    2.   Sun damaged skin with solar lentigines   - Recommend sunscreens SPF #30 or greater, protective clothing and avoidance of tanning beds.    3.   Benign findings including: seborrheic keratoses, cherry angioma, skin tags, sebaceous hyperplasia   - No further intervention required. Patient to report changes.    - Patient reassured of the benign nature of these lesions.    4.   Multiple clinically benign nevi, no significant atypia today   - No further intervention required. Patient to report changes.    - Patient reassured of the benign nature of these lesions.    5.   History of dysplastic nevi: No evidence of recurrence    Follow-up in 3 months for skin check, earlier for new or changing lesions.     Staff Involved:  Staff only    Melisa Richardson MD    Department of Dermatology  [facility]: Phone: [phone], Fax:[phone]  [facility]: Phone: [phone], Fax: [phone]

## 2021-02-07 ENCOUNTER — HEALTH MAINTENANCE LETTER (OUTPATIENT)
Age: 51
End: 2021-02-07

## 2021-03-25 ENCOUNTER — DOCUMENTATION ONLY (OUTPATIENT)
Dept: CARE COORDINATION | Facility: CLINIC | Age: 51
End: 2021-03-25

## 2021-03-25 NOTE — PROGRESS NOTES
[facility] Dermatology Note  Encounter Date: Mar 26, 2021  Office Visit     Dermatology Problem List:  PLC/Melanoma Clinic Patient  TBP completed 3/26/21  Skin checks q3 months through 7/2021  1. History of melanoma, left back, Stage IIB (cT3b, cN0, cM0)   -Initial biopsy on 6/2019: BD at least 3.5 with ulceration and 10 mitoses/mm2  - s/p WLE with keystone flap and negative left axillary sentinel lymph node biopsy (7/18/2019) with Gwyn Raman and Tate   2. 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 She has two children (one in high school, one in college).    Family history: Father had unknown types of skin cancer. Patient and her father are no longer in contact for her to ask. No family history of pancreatic cancer.     ____________________________________________    Assessment & Plan:     # Melanoma, stage IIB, left back. No evidence of recurrence. Discussed risk of melanoma recurrence (with local or distant disease) and risk of additional primary melanomas. Explained routine schedule for follow up examinations in office and need for self skin checks monthly. Discussed the use of total body photography (TBP) and digital dermoscopic documentation (DDD) to catch melanomas earlier. We reviewed that this is billed to insurance, but that it is unlikely to cover the costs associated with this. If not covered, the cost to the patient will be $205. Completed today.  - ABCDs of melanoma were discussed and self skin checks were advised.   - Sun precaution was advised including the use of  "sunscreens of SPF 30 or higher, sun protective clothing, and avoidance of tanning beds.  - Recommended yearly dental, gyn, and ophthalmology examinations.  - Recommended first degree relatives get yearly skin exams as well.  - TBP with dermoscopy photos completed today for follow up in the future.    # History of DN: No evidence of recurrence. While these are benign, they are a marker for increased melanoma risk.  - Recommended yearly skin checks.    # Subcutaneous nodule, left chest  - suspect likely epidermal inclusion cyst versus traumatic fat necrosis, but given history of melanoma, will refer to dermatologic surgery for excisional biopsy. Photo taken today.    # Sun damaged skin with solar lentigines: Chronic, stable.  - Recommend sunscreens SPF #30 or greater, protective clothing and avoidance of tanning beds.    # Benign skin findings including: seborrheic keratoses, cherry angioma, skin tags, sebaceous hyperplasia - minor, self limited problem  - No further intervention required. Patient to report changes.   - Patient reassured of the benign nature of these lesions.    # Multiple clinically benign nevi: Chronic, stable  - No further intervention required. Patient to report changes.   - Patient reassured of the benign nature of these lesions.    Procedures Performed:   None    Follow-up: 3 months    Staff:     Melisa Richardson MD    Department of Dermatology  [facility]: Phone: [phone], Fax:[phone]  [facility]: Phone: [phone], Fax: [phone]    ____________________________________________    CC: Derm Problem (Elpidio is here for a skin check and full body photos. She does have an area of concern of her left chest/axilla. )    HPI:  Ms. Cruz \"Elpidio\" Aiyana is a(n) 50 year old female who presents today as a return patient for skin check.  - last seen 12/11/20. No " concerning findings at that time.   - today, notes a nodule on the left lateral chest, was wondering if it was caused by a pressure from leaning against desk during Zoom meetings  - patient is otherwise feeling well, without additional skin concerns. Denies any fevers, chills, cough, shortness of breath, or weight loss.       Labs Reviewed:  N/A    Physical Exam:  Vitals: LMP 12/02/2012   SKIN: Total skin, which includes the head/face, both arms, chest, back, abdomen,both legs, buttocks, digits and/or nails, was examined.  - Archuleta Type II  - Well healed flap on the upper to mid back. No pigment recurrence or nodularity.   - Scattered brown macules on sun exposed areas, particularly the shoulders.  - Three linear scars with no pigment recurrence on the back (one on the right, two on the left).  - Skin tags in axillae and at neck.  - Subcutaneous nodule with overlying violaceous patch: left chest. No visible punctum.  - Scattered sebaceous hyperplasia on the face.  - There are dome shaped bright red papules on the face, scalp, and trunk.   - Multiple regular brown pigmented macules and papules. Some are larger than 6mm in size but these are uniform. Some have borders that feather off in the periphery or are patchy on dermoscopy. None have significant atypia on dermoscopic examination today.   - There are waxy stuck on tan to brown papules on the trunk.  - No other lesions of concern on areas examined.   LYMPH NODES: No submandibular, cervical, supraclavicular, axillary, or inguinal lymphadenopathy palpable on examination.       Medications:  Current Outpatient Medications   Medication     fluticasone (FLONASE) 50 MCG/ACT spray     hydrocortisone (WESTCORT) 0.2 % external cream     No current facility-administered medications for this visit.       Past Medical History:   Patient Active Problem List   Diagnosis     Snoring     S/P hysterectomy     Anemia     Malignant melanoma of torso excluding breast (H)      Past Medical History:   Diagnosis Date     Anemia      NO ACTIVE PROBLEMS        CC Melisa Richardson MD  [address] [place] [address] on close of this encounter.

## 2021-03-26 ENCOUNTER — OFFICE VISIT (OUTPATIENT)
Dept: DERMATOLOGY | Facility: CLINIC | Age: 51
End: 2021-03-26
Payer: COMMERCIAL

## 2021-03-26 DIAGNOSIS — L82.1 SEBORRHEIC KERATOSIS: ICD-10-CM

## 2021-03-26 DIAGNOSIS — Z86.018 HISTORY OF DYSPLASTIC NEVUS: ICD-10-CM

## 2021-03-26 DIAGNOSIS — Z85.820 HISTORY OF MELANOMA: ICD-10-CM

## 2021-03-26 DIAGNOSIS — L57.8 SUN-DAMAGED SKIN: ICD-10-CM

## 2021-03-26 DIAGNOSIS — L91.8 SKIN TAG: ICD-10-CM

## 2021-03-26 DIAGNOSIS — L81.4 SOLAR LENTIGO: ICD-10-CM

## 2021-03-26 DIAGNOSIS — L73.8 SENILE SEBACEOUS GLAND HYPERPLASIA: ICD-10-CM

## 2021-03-26 DIAGNOSIS — D22.9 MULTIPLE BENIGN NEVI: ICD-10-CM

## 2021-03-26 DIAGNOSIS — R22.9 SUBCUTANEOUS NODULE: Primary | ICD-10-CM

## 2021-03-26 DIAGNOSIS — D18.01 CHERRY ANGIOMA: ICD-10-CM

## 2021-03-26 PROCEDURE — 99213 OFFICE O/P EST LOW 20 MIN: CPT | Mod: 25 | Performed by: DERMATOLOGY

## 2021-03-26 ASSESSMENT — PAIN SCALES - GENERAL: PAINLEVEL: NO PAIN (0)

## 2021-03-26 NOTE — NURSING NOTE
Chief Complaint   Patient presents with     Derm Problem     Elpidio is here for a skin check and full body photos. She does have an area of concern of her left chest/axilla.      Nesha Fay LPN

## 2021-03-26 NOTE — LETTER
3/26/2021       RE: Nancy Bronson  [address] [facility] [address]     Dear Colleague,    Thank you for referring your patient, Nancy Bronson, to the [facility] DERMATOLOGY [facility]INIC [place] at [facility]. Please see a copy of my visit note below.    [facility] Dermatology Note  Encounter Date: Mar 26, 2021  Office Visit     Dermatology Problem List:  PLC/Melanoma Clinic Patient  TBP completed 3/26/21  Skin checks q3 months through 7/2021  1. History of melanoma, left back, Stage IIB (cT3b, cN0, cM0)   -Initial biopsy on 6/2019: BD at least 3.5 with ulceration and 10 mitoses/mm2  - s/p WLE with keystone flap and negative left axillary sentinel lymph node biopsy (7/18/2019) with Gwyn Raman and Tate   2. 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 She has two children (one in high school, one in college).    Family history: Father had unknown types of skin cancer. Patient and her father are no longer in contact for her to ask. No family history of pancreatic cancer.     ____________________________________________    Assessment & Plan:     # Melanoma, stage IIB, left back. No evidence of recurrence. Discussed risk of melanoma recurrence (with local or distant disease) and risk of additional primary melanomas. Explained routine schedule for follow up examinations in office and need for self skin checks monthly. Discussed the use of total body photography (TBP) and digital dermoscopic documentation (DDD) to catch melanomas  earlier. We reviewed that this is billed to insurance, but that it is unlikely to cover the costs associated with this. If not covered, the cost to the patient will be $205. Completed today.  - ABCDs of melanoma were discussed and self skin checks were advised.   - Sun precaution was advised including the use of sunscreens of SPF 30 or higher, sun protective clothing, and avoidance of tanning beds.  - Recommended yearly dental, gyn, and ophthalmology examinations.  - Recommended first degree relatives get yearly skin exams as well.  - TBP with dermoscopy photos completed today for follow up in the future.    # History of DN: No evidence of recurrence. While these are benign, they are a marker for increased melanoma risk.  - Recommended yearly skin checks.    # Subcutaneous nodule, left chest  - suspect likely epidermal inclusion cyst versus traumatic fat necrosis, but given history of melanoma, will refer to dermatologic surgery for excisional biopsy. Photo taken today.    # Sun damaged skin with solar lentigines: Chronic, stable.  - Recommend sunscreens SPF #30 or greater, protective clothing and avoidance of tanning beds.    # Benign skin findings including: seborrheic keratoses, cherry angioma, skin tags, sebaceous hyperplasia - minor, self limited problem  - No further intervention required. Patient to report changes.   - Patient reassured of the benign nature of these lesions.    # Multiple clinically benign nevi: Chronic, stable  - No further intervention required. Patient to report changes.   - Patient reassured of the benign nature of these lesions.    Procedures Performed:   None    Follow-up: 3 months    Staff:     Melisa Richardson MD    Department of Dermatology  [facility]: Phone: [phone], Fax:[phone]  [facility]: Phone: [phone], Fax:  "[phone]    ____________________________________________    CC: Derm Problem (Elpidio is here for a skin check and full body photos. She does have an area of concern of her left chest/axilla. )    HPI:  Ms. Cruz \"Elpidio\" Aiyana is a(n) 50 year old female who presents today as a return patient for skin check.  - last seen 12/11/20. No concerning findings at that time.   - today, notes a nodule on the left lateral chest, was wondering if it was caused by a pressure from leaning against desk during Zoom meetings  - patient is otherwise feeling well, without additional skin concerns. Denies any fevers, chills, cough, shortness of breath, or weight loss.       Labs Reviewed:  N/A    Physical Exam:  Vitals: LMP 12/02/2012   SKIN: Total skin, which includes the head/face, both arms, chest, back, abdomen,both legs, buttocks, digits and/or nails, was examined.  - Archuleta Type II  - Well healed flap on the upper to mid back. No pigment recurrence or nodularity.   - Scattered brown macules on sun exposed areas, particularly the shoulders.  - Three linear scars with no pigment recurrence on the back (one on the right, two on the left).  - Skin tags in axillae and at neck.  - Subcutaneous nodule with overlying violaceous patch: left chest. No visible punctum.  - Scattered sebaceous hyperplasia on the face.  - There are dome shaped bright red papules on the face, scalp, and trunk.   - Multiple regular brown pigmented macules and papules. Some are larger than 6mm in size but these are uniform. Some have borders that feather off in the periphery or are patchy on dermoscopy. None have significant atypia on dermoscopic examination today.   - There are waxy stuck on tan to brown papules on the trunk.  - No other lesions of concern on areas examined.   LYMPH NODES: No submandibular, cervical, supraclavicular, axillary, or inguinal lymphadenopathy palpable on examination.       Medications:  Current Outpatient Medications "   Medication     fluticasone (FLONASE) 50 MCG/ACT spray     hydrocortisone (WESTCORT) 0.2 % external cream     No current facility-administered medications for this visit.       Past Medical History:   Patient Active Problem List   Diagnosis     Snoring     S/P hysterectomy     Anemia     Malignant melanoma of torso excluding breast (H)     Past Medical History:   Diagnosis Date     Anemia      NO ACTIVE PROBLEMS        CC Melisa Richardson MD  [address] [place] [phone] on close of this encounter.

## 2021-04-06 ENCOUNTER — OFFICE VISIT (OUTPATIENT)
Dept: DERMATOLOGY | Facility: CLINIC | Age: 51
End: 2021-04-06
Payer: COMMERCIAL

## 2021-04-06 VITALS — DIASTOLIC BLOOD PRESSURE: 86 MMHG | HEART RATE: 81 BPM | SYSTOLIC BLOOD PRESSURE: 127 MMHG

## 2021-04-06 DIAGNOSIS — R22.9 SUBCUTANEOUS NODULE: ICD-10-CM

## 2021-04-06 PROCEDURE — 11402 EXC TR-EXT B9+MARG 1.1-2 CM: CPT | Mod: 51 | Performed by: DERMATOLOGY

## 2021-04-06 PROCEDURE — 88305 TISSUE EXAM BY PATHOLOGIST: CPT | Mod: GC | Performed by: DERMATOLOGY

## 2021-04-06 PROCEDURE — 12031 INTMD RPR S/A/T/EXT 2.5 CM/<: CPT | Performed by: DERMATOLOGY

## 2021-04-06 PROCEDURE — 88342 IMHCHEM/IMCYTCHM 1ST ANTB: CPT | Mod: GC | Performed by: DERMATOLOGY

## 2021-04-06 ASSESSMENT — PAIN SCALES - GENERAL: PAINLEVEL: NO PAIN (0)

## 2021-04-06 NOTE — PATIENT INSTRUCTIONS

## 2021-04-06 NOTE — NURSING NOTE
Chief Complaint   Patient presents with     Derm Problem     evaluation and excision chest     Doris Cruz, EMT

## 2021-04-06 NOTE — LETTER
4/6/2021       RE: Nancy Bronson  [address] [facility] [address]     Dear Colleague,    Thank you for referring your patient, Nancy Bronson, to the [facility] DERMATOLOGIC SURGERY CLINIC [place] at [facility]. Please see a copy of my visit note below.    [facility] Dermatology Note  Encounter Date: Apr 6, 2021  Office Visit     Dermatology Problem List:  PLC/Melanoma Clinic Patient  TBP completed 3/26/21  Skin checks q3 months through 7/2021  1. History of melanoma, left back, Stage IIB (cT3b, cN0, cM0)   -Initial biopsy on 6/2019: BD at least 3.5 with ulceration and 10 mitoses/mm2  - s/p WLE with keystone flap and negative left axillary sentinel lymph node biopsy (7/18/2019) with Gwyn Raman and Tate   2. 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 She has two children (one in high school, one in college).    Family history: Father had unknown types of skin cancer. Patient and her father are no longer in contact for her to ask. No family history of pancreatic cancer.     ____________________________________________    Assessment & Plan:   # Subcutaneous nodule, left chest  Concern for metastatic nodule or lymph node.    Excisional biopsy - See procedure note.     Staff:     Raul Donis DO    Department of Dermatology  [facility]: Phone: [phone], Fax:[phone]  [facility]: Phone:  "[phone], Fax: [phone]      ____________________________________________    CC: Derm Problem (evaluation and excision chest)    HPI:  Ms. Cruz \"Elpidio\" Aiyana is a(n) 51 year old female who presents today as a return patient for a new subcutaneous nodule of the left anterior axillary fold. She notes it's been more noticeable after pressure from leaning against desk during Zoom meetings.    - patient is otherwise feeling well, without additional skin concerns. Denies any fevers, chills, cough, shortness of breath, or weight loss.    Labs Reviewed:  N/A    Physical Exam:  Vitals: /86 (BP Location: Left arm)   Pulse 81   LMP 12/02/2012   SKIN: Total skin, which includes the head/face, both arms, chest, back, abdomen,both legs, buttocks, digits and/or nails, was examined.  - Archuleta Type II  - Subcutaneous nodule with overlying violaceous patch: left chest. No visible punctum.    LYMPH NODES: No submandibular, cervical, supraclavicular, axillary, or inguinal lymphadenopathy palpable on examination.     Medications:  Current Outpatient Medications   Medication     fluticasone (FLONASE) 50 MCG/ACT spray     hydrocortisone (WESTCORT) 0.2 % external cream     No current facility-administered medications for this visit.       Past Medical History:   Patient Active Problem List   Diagnosis     Snoring     S/P hysterectomy     Anemia     Malignant melanoma of torso excluding breast (H)     Past Medical History:   Diagnosis Date     Anemia      NO ACTIVE PROBLEMS        CC Melisa Richardson MD  [address] [place] [address] on close of this encounter.      "

## 2021-04-12 LAB — COPATH REPORT: NORMAL

## 2021-04-28 NOTE — PROGRESS NOTES
"[facility] Dermatology Note  Encounter Date: Apr 6, 2021  Office Visit     Dermatology Problem List:  PLC/Melanoma Clinic Patient  TBP completed 3/26/21  Skin checks q3 months through 7/2021  1. History of melanoma, left back, Stage IIB (cT3b, cN0, cM0)   -Initial biopsy on 6/2019: BD at least 3.5 with ulceration and 10 mitoses/mm2  - s/p WLE with keystone flap and negative left axillary sentinel lymph node biopsy (7/18/2019) with Gwyn Raman and Tate   2. 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 She has two children (one in high school, one in college).    Family history: Father had unknown types of skin cancer. Patient and her father are no longer in contact for her to ask. No family history of pancreatic cancer.     ____________________________________________    Assessment & Plan:   # Subcutaneous nodule, left chest  Concern for metastatic nodule or lymph node.    Excisional biopsy - See procedure note.     Staff:     Raul Donis DO    Department of Dermatology  [facility]: Phone: [phone], Fax:[phone]  [facility]: Phone: [phone], Fax: [phone]      ____________________________________________    CC: Derm Problem (evaluation and excision chest)    HPI:  Ms. Cruz \"Elpidio\" Quaal is a(n) 51 year old female who presents today as a return patient for a new subcutaneous nodule of the left anterior axillary fold. She notes it's been more " noticeable after pressure from leaning against desk during Zoom meetings.    - patient is otherwise feeling well, without additional skin concerns. Denies any fevers, chills, cough, shortness of breath, or weight loss.    Labs Reviewed:  N/A    Physical Exam:  Vitals: /86 (BP Location: Left arm)   Pulse 81   LMP 12/02/2012   SKIN: Total skin, which includes the head/face, both arms, chest, back, abdomen,both legs, buttocks, digits and/or nails, was examined.  - Archuleta Type II  - Subcutaneous nodule with overlying violaceous patch: left chest. No visible punctum.    LYMPH NODES: No submandibular, cervical, supraclavicular, axillary, or inguinal lymphadenopathy palpable on examination.     Medications:  Current Outpatient Medications   Medication     fluticasone (FLONASE) 50 MCG/ACT spray     hydrocortisone (WESTCORT) 0.2 % external cream     No current facility-administered medications for this visit.       Past Medical History:   Patient Active Problem List   Diagnosis     Snoring     S/P hysterectomy     Anemia     Malignant melanoma of torso excluding breast (H)     Past Medical History:   Diagnosis Date     Anemia      NO ACTIVE PROBLEMS        CC Melisa Richardson MD  [address] [place] [address] on close of this encounter.

## 2021-04-28 NOTE — PROCEDURES
DERMATOLOGY EXCISION PROCEDURE NOTE      NAME OF PROCEDURE: Excision intermediate layered linear closure  Staff surgeon: Raul Donis DO  Scrub Nurse: DELMER James    PRE-OPERATIVE DIAGNOSIS:  Neoplasm of undetermined behavior - lipoma vs cyst vs Dermatofibroma  POST-OPERATIVE DIAGNOSIS: same   FINAL EXCISION SIZE(DEFECT SIZE): 1.2x1.2 cm, with 1 mm margin   FINAL REPAIR LENGTH: 2.5 cm     INDICATIONS: This patient presented with a 1.0x1.0cm subQ nodule of the left lateral chest (left anterior axillary fold). Excision was indicated. We discussed the principles of treatment and most likely complications including scarring, bleeding, infection, incomplete excision, wound dehiscence, pain, nerve damage, and recurrence. Informed consent was obtained and the patient underwent the procedure as follows:    PROCEDURE: The patient was taken to the operative suite. Time-out was performed.  The treatment area was anesthetized with 1% lidocaine and epinephrine (1:100,000). The area was prepped with Chlorhexidine and rinsed with sterile saline and draped with sterile towels. The lesion was delineated and excised down to subcutaneous fat in a elliptical manner. Hemostasis was obtained by electrocoagulation.     REPAIR: An intermediate layered linear closure was selected as the procedure which would maximally preserve both function and cosmesis.    After the excision of the tumor, the area was carefully undermined. Hemostasis was obtained with electrocoagulation.  Closure was oriented so that the wound was in the patient's natural skin tension lines. The subcutaneous and dermal layers were then closed with 4-0 Monocryl sutures. The epidermis was then carefully approximated along the length of the wound using 4-0 monocryl running subcuticular sutures.     The final wound length was 2.5 cm. A total of 9 ml of anesthesia was administered for all surgical sites. Estimated blood loss was less than 10 ml for all surgical sites. A  sterile pressure dressing was applied and wound care instructions, with a written handout, were given. The patient was discharged from the [facility] alert and ambulatory.    Follow-up as needed for wound evaluation.     Anatomic Pathology Results: pending    Staff Involved:  I personally performed the procedures today.    Raul Donis DO    Department of Dermatology  [facility]: Phone: [phone], Fax:[phone]  [facility]: Phone: [phone], Fax: [phone]

## 2021-05-06 ENCOUNTER — ONCOLOGY VISIT (OUTPATIENT)
Dept: ONCOLOGY | Facility: CLINIC | Age: 51
End: 2021-05-06
Attending: SURGERY
Payer: COMMERCIAL

## 2021-05-06 VITALS
HEIGHT: 67 IN | BODY MASS INDEX: 34.06 KG/M2 | RESPIRATION RATE: 16 BRPM | TEMPERATURE: 98 F | DIASTOLIC BLOOD PRESSURE: 83 MMHG | OXYGEN SATURATION: 98 % | SYSTOLIC BLOOD PRESSURE: 119 MMHG | WEIGHT: 217 LBS | HEART RATE: 98 BPM

## 2021-05-06 DIAGNOSIS — C43.59 MALIGNANT MELANOMA OF TORSO EXCLUDING BREAST (H): Primary | ICD-10-CM

## 2021-05-06 PROCEDURE — 99212 OFFICE O/P EST SF 10 MIN: CPT | Performed by: SURGERY

## 2021-05-06 ASSESSMENT — MIFFLIN-ST. JEOR: SCORE: 1624.55

## 2021-05-06 ASSESSMENT — PAIN SCALES - GENERAL: PAINLEVEL: NO PAIN (0)

## 2021-05-06 NOTE — LETTER
"    5/6/2021         RE: Nancy Bronson  [address] [facility] [address]        Dear Colleague,    Thank you for referring your patient, Nancy Bronson, to the [facility]. Please see a copy of my visit note below.    FOLLOW-UP  May 6, 2021    Nancy Bronson is a 51 year old female who returns for follow-up for     Cancer Staging  Malignant melanoma of torso excluding breast (H)  Staging form: Melanoma of the Skin, AJCC 8th Edition  - Clinical: Stage IIB (cT3b, cN0, cM0) - Unsigned  - Pathologic: Stage IIB (pT3b, pN0, cM0) - Signed by Susan Raman MD on 7/30/2019    Treatment to date:  1. Wide local excision of left back melanoma, left axillary sentinel lymph node biopsy (7/18/2019)    HPI:    Since her last visit, she has been doing well. She denies any headaches, dizziness, vision changes, abdominal pain, bowel habit changes, rectal bleeding, melena, chest pain, shortness of breath, or unintentional weight loss.  She has not noted any masses in either axilla or groin bilaterally. She has good range of motion of her shoulders bilaterally and denies any upper extremity swelling.    Her last skin check was performed by Dr Richardson on 3/26/2021.  A left lateral chest subcutaneous nodule was noted; an excisional biopsy was performed by Dr Donis.  Final pathology showed a dermatofibroma. Her next check is planned for June 25, 2021.     /83   Pulse 98   Temp 98  F (36.7  C)   Resp 16   Ht 1.69 m (5' 6.54\")   Wt 98.4 kg (217 lb)   LMP 12/02/2012   SpO2 98%   BMI 34.46 kg/m     Physical Exam  Constitutional:       Appearance: She is well-developed.   Pulmonary:      Effort: No respiratory distress.   Abdominal:      General: There is no distension.      Palpations: Abdomen is soft. There is no fluid wave, hepatomegaly or mass.      Tenderness: There is no abdominal tenderness.   Lymphadenopathy:      Cervical: No cervical adenopathy.      Right " cervical: No superficial, deep or posterior cervical adenopathy.     Left cervical: No superficial, deep or posterior cervical adenopathy.      Upper Body:      Right upper body: No supraclavicular, axillary or pectoral adenopathy.      Left upper body: No supraclavicular, axillary or pectoral adenopathy.      Lower Body: No right inguinal adenopathy. No left inguinal adenopathy.      Comments: Axillary incision unremarkable.   Skin:     General: Skin is warm and dry.                 INVESTIGATIONS:  None    ASSESSMENT:    Nancy Bronson is a 51 year old female with truncal melanoma, nearly 2 years s/p surgery.    She is doing well without signs or symptoms of recurrence.    We reviewed that surveillance involves a history and physical exam every 6 months for the first 5 years, then annually, with imaging studies only indicated if symptoms arise. I recommended surgical follow up with Bekah Crawford PA-C for this purpose.   We reviewed the signs and symptoms that could arise in the setting of recurrent locoregional or metastatic disease. In addition, she will need to undergo regular dermatologic full skin survey and evaluation given that patients with a diagnosis of melanoma are at risk of recurrence (local and distant) and of subsequent de sherrill melanoma.  I also reviewed the importance of protection from sun exposure, including wearing long sleeves, hats, etc and also the use of sunscreen with SPF of at least 35, with frequent re-applications.     Total time spent with the patient was 15 minutes, of which 75% was counseling.     PLAN:  1. Follow up with Dr Richardson as scheduled  2. Follow up with Bekah Crawford PA-C in 6 months    Susan Raman MD MSc [facility] FACS    Division of Surgical Oncology  [facility]     18 minutes spent on the date of the encounter doing chart review, review of test results, interpretation of tests, patient visit and documentation.        Again, thank you for  allowing me to participate in the care of your patient.        Sincerely,        Susan Raman MD

## 2021-05-06 NOTE — PROGRESS NOTES
"FOLLOW-UP  May 6, 2021    Nancy Bronson is a 51 year old female who returns for follow-up for     Cancer Staging  Malignant melanoma of torso excluding breast (H)  Staging form: Melanoma of the Skin, AJCC 8th Edition  - Clinical: Stage IIB (cT3b, cN0, cM0) - Unsigned  - Pathologic: Stage IIB (pT3b, pN0, cM0) - Signed by Susan Raman MD on 7/30/2019    Treatment to date:  1. Wide local excision of left back melanoma, left axillary sentinel lymph node biopsy (7/18/2019)    HPI:    Since her last visit, she has been doing well. She denies any headaches, dizziness, vision changes, abdominal pain, bowel habit changes, rectal bleeding, melena, chest pain, shortness of breath, or unintentional weight loss.  She has not noted any masses in either axilla or groin bilaterally. She has good range of motion of her shoulders bilaterally and denies any upper extremity swelling.    Her last skin check was performed by Dr Richardson on 3/26/2021.  A left lateral chest subcutaneous nodule was noted; an excisional biopsy was performed by Dr Donis.  Final pathology showed a dermatofibroma. Her next check is planned for June 25, 2021.     /83   Pulse 98   Temp 98  F (36.7  C)   Resp 16   Ht 1.69 m (5' 6.54\")   Wt 98.4 kg (217 lb)   LMP 12/02/2012   SpO2 98%   BMI 34.46 kg/m     Physical Exam  Constitutional:       Appearance: She is well-developed.   Pulmonary:      Effort: No respiratory distress.   Abdominal:      General: There is no distension.      Palpations: Abdomen is soft. There is no fluid wave, hepatomegaly or mass.      Tenderness: There is no abdominal tenderness.   Lymphadenopathy:      Cervical: No cervical adenopathy.      Right cervical: No superficial, deep or posterior cervical adenopathy.     Left cervical: No superficial, deep or posterior cervical adenopathy.      Upper Body:      Right upper body: No supraclavicular, axillary or pectoral adenopathy.      Left upper body: No " supraclavicular, axillary or pectoral adenopathy.      Lower Body: No right inguinal adenopathy. No left inguinal adenopathy.      Comments: Axillary incision unremarkable.   Skin:     General: Skin is warm and dry.                 INVESTIGATIONS:  None    ASSESSMENT:    Nancy Bronson is a 51 year old female with truncal melanoma, nearly 2 years s/p surgery.    She is doing well without signs or symptoms of recurrence.    We reviewed that surveillance involves a history and physical exam every 6 months for the first 5 years, then annually, with imaging studies only indicated if symptoms arise. I recommended surgical follow up with Bekah Crawford PA-C for this purpose.   We reviewed the signs and symptoms that could arise in the setting of recurrent locoregional or metastatic disease. In addition, she will need to undergo regular dermatologic full skin survey and evaluation given that patients with a diagnosis of melanoma are at risk of recurrence (local and distant) and of subsequent de sherrill melanoma.  I also reviewed the importance of protection from sun exposure, including wearing long sleeves, hats, etc and also the use of sunscreen with SPF of at least 35, with frequent re-applications.     Total time spent with the patient was 15 minutes, of which 75% was counseling.     PLAN:  1. Follow up with Dr Richardson as scheduled  2. Follow up with Bekah Crawford PA-C in 6 months    Susan Raman MD MSc [facility] FACS    Division of Surgical Oncology  [facility]     18 minutes spent on the date of the encounter doing chart review, review of test results, interpretation of tests, patient visit and documentation.

## 2021-06-16 ASSESSMENT — ENCOUNTER SYMPTOMS
NERVOUS/ANXIOUS: 0
WEAKNESS: 0
HEARTBURN: 0
EYE PAIN: 0
NAUSEA: 0
ARTHRALGIAS: 0
CHILLS: 0
BREAST MASS: 0
DIZZINESS: 0
FEVER: 0
HEMATOCHEZIA: 0
SORE THROAT: 0
COUGH: 0
HEMATURIA: 0
SHORTNESS OF BREATH: 0
HEADACHES: 0
DIARRHEA: 0
CONSTIPATION: 0
JOINT SWELLING: 0
ABDOMINAL PAIN: 0
PALPITATIONS: 0
MYALGIAS: 0
FREQUENCY: 0
PARESTHESIAS: 0
DYSURIA: 0

## 2021-06-17 ENCOUNTER — OFFICE VISIT (OUTPATIENT)
Dept: FAMILY MEDICINE | Facility: CLINIC | Age: 51
End: 2021-06-17
Payer: COMMERCIAL

## 2021-06-17 VITALS
TEMPERATURE: 98 F | OXYGEN SATURATION: 96 % | BODY MASS INDEX: 36 KG/M2 | DIASTOLIC BLOOD PRESSURE: 76 MMHG | HEART RATE: 85 BPM | SYSTOLIC BLOOD PRESSURE: 114 MMHG | RESPIRATION RATE: 16 BRPM | HEIGHT: 65 IN | WEIGHT: 216.1 LBS

## 2021-06-17 DIAGNOSIS — Z13.220 SCREENING, LIPID: ICD-10-CM

## 2021-06-17 DIAGNOSIS — Z13.1 SCREENING FOR DIABETES MELLITUS: ICD-10-CM

## 2021-06-17 DIAGNOSIS — N63.0 LUMP OR MASS IN BREAST: ICD-10-CM

## 2021-06-17 DIAGNOSIS — Z13.0 SCREENING FOR BLOOD DISEASE: Primary | ICD-10-CM

## 2021-06-17 DIAGNOSIS — Z12.11 SCREEN FOR COLON CANCER: ICD-10-CM

## 2021-06-17 DIAGNOSIS — Z11.59 NEED FOR HEPATITIS C SCREENING TEST: ICD-10-CM

## 2021-06-17 LAB
ALBUMIN SERPL-MCNC: 3.6 G/DL (ref 3.4–5)
ALP SERPL-CCNC: 92 U/L (ref 40–150)
ALT SERPL W P-5'-P-CCNC: 30 U/L (ref 0–50)
ANION GAP SERPL CALCULATED.3IONS-SCNC: 6 MMOL/L (ref 3–14)
AST SERPL W P-5'-P-CCNC: 17 U/L (ref 0–45)
BILIRUB SERPL-MCNC: 0.4 MG/DL (ref 0.2–1.3)
BUN SERPL-MCNC: 9 MG/DL (ref 7–30)
CALCIUM SERPL-MCNC: 9.2 MG/DL (ref 8.5–10.1)
CHLORIDE SERPL-SCNC: 105 MMOL/L (ref 94–109)
CHOLEST SERPL-MCNC: 170 MG/DL
CO2 SERPL-SCNC: 29 MMOL/L (ref 20–32)
CREAT SERPL-MCNC: 1.01 MG/DL (ref 0.52–1.04)
ERYTHROCYTE [DISTWIDTH] IN BLOOD BY AUTOMATED COUNT: 15.9 % (ref 10–15)
GFR SERPL CREATININE-BSD FRML MDRD: 64 ML/MIN/{1.73_M2}
GLUCOSE SERPL-MCNC: 91 MG/DL (ref 70–99)
HCT VFR BLD AUTO: 43 % (ref 35–47)
HCV AB SERPL QL IA: NONREACTIVE
HDLC SERPL-MCNC: 43 MG/DL
HGB BLD-MCNC: 13.8 G/DL (ref 11.7–15.7)
LDLC SERPL CALC-MCNC: 110 MG/DL
MCH RBC QN AUTO: 27.8 PG (ref 26.5–33)
MCHC RBC AUTO-ENTMCNC: 32.1 G/DL (ref 31.5–36.5)
MCV RBC AUTO: 87 FL (ref 78–100)
NONHDLC SERPL-MCNC: 127 MG/DL
PLATELET # BLD AUTO: 310 10E9/L (ref 150–450)
POTASSIUM SERPL-SCNC: 4.1 MMOL/L (ref 3.4–5.3)
PROT SERPL-MCNC: 7.6 G/DL (ref 6.8–8.8)
RBC # BLD AUTO: 4.97 10E12/L (ref 3.8–5.2)
SODIUM SERPL-SCNC: 140 MMOL/L (ref 133–144)
TRIGL SERPL-MCNC: 85 MG/DL
WBC # BLD AUTO: 9.5 10E9/L (ref 4–11)

## 2021-06-17 PROCEDURE — 85027 COMPLETE CBC AUTOMATED: CPT | Performed by: PHYSICIAN ASSISTANT

## 2021-06-17 PROCEDURE — 99396 PREV VISIT EST AGE 40-64: CPT | Performed by: PHYSICIAN ASSISTANT

## 2021-06-17 PROCEDURE — 80053 COMPREHEN METABOLIC PANEL: CPT | Performed by: PHYSICIAN ASSISTANT

## 2021-06-17 PROCEDURE — 36415 COLL VENOUS BLD VENIPUNCTURE: CPT | Performed by: PHYSICIAN ASSISTANT

## 2021-06-17 PROCEDURE — 80061 LIPID PANEL: CPT | Performed by: PHYSICIAN ASSISTANT

## 2021-06-17 PROCEDURE — 86803 HEPATITIS C AB TEST: CPT | Performed by: PHYSICIAN ASSISTANT

## 2021-06-17 ASSESSMENT — ENCOUNTER SYMPTOMS
JOINT SWELLING: 0
HEMATOCHEZIA: 0
HEMATURIA: 0
PALPITATIONS: 0
BREAST MASS: 0
DIZZINESS: 0
NERVOUS/ANXIOUS: 0
EYE PAIN: 0
CONSTIPATION: 0
HEARTBURN: 0
COUGH: 0
CHILLS: 0
MYALGIAS: 0
WEAKNESS: 0
FREQUENCY: 0
SHORTNESS OF BREATH: 0
NAUSEA: 0
HEADACHES: 0
DIARRHEA: 0
ABDOMINAL PAIN: 0
FEVER: 0
PARESTHESIAS: 0
ARTHRALGIAS: 0
DYSURIA: 0
SORE THROAT: 0

## 2021-06-17 ASSESSMENT — MIFFLIN-ST. JEOR: SCORE: 1596.1

## 2021-06-17 NOTE — PROGRESS NOTES
SUBJECTIVE:   CC: Nancy Bronson is an 51 year old woman who presents for preventive health visit  Patient has been advised of split billing requirements and indicates understanding: Yes  Healthy Habits:     Getting at least 3 servings of Calcium per day:  Yes    Bi-annual eye exam:  Yes    Dental care twice a year:  Yes    Sleep apnea or symptoms of sleep apnea:  Excessive snoring    Diet:  Regular (no restrictions)    Frequency of exercise:  None    Taking medications regularly:  Not Applicable    Medication side effects:  Not applicable    PHQ-2 Total Score: 0    Additional concerns today:  Yes      Diagnosed with melanoma and had surgery to remove it on her back.  Derm told her to make sure she had vaginal area checked at her next  appt with primary.     Today's PHQ-2 Score:   PHQ-2 ( 1999 [employer]) 6/16/2021   Q1: Little interest or pleasure in doing things 0   Q2: Feeling down, depressed or hopeless 0   PHQ-2 Score 0   Q1: Little interest or pleasure in doing things Not at all   Q2: Feeling down, depressed or hopeless Not at all   PHQ-2 Score 0       Abuse: Current or Past (Physical, Sexual or Emotional) - No  Do you feel safe in your environment? Yes    Have you ever done Advance Care Planning? (For example, a Health Directive, POLST, or a discussion with a medical provider or your loved ones about your wishes): No, advance care planning information given to patient to review.  Patient plans to discuss their wishes with loved ones or provider.      Social History     Tobacco Use     Smoking status: Former Smoker     Smokeless tobacco: Never Used     Tobacco comment: quit in 1995   Substance Use Topics     Alcohol use: Yes     Comment: social     If you drink alcohol do you typically have >3 drinks per day or >7 drinks per week? No    Alcohol Use 6/17/2021   Prescreen: >3 drinks/day or >7 drinks/week? -   Prescreen: >3 drinks/day or >7 drinks/week? No       Reviewed orders with patient.  Reviewed health  maintenance and updated orders accordingly - Yes  BP Readings from Last 3 Encounters:   06/17/21 114/76   05/06/21 119/83   04/06/21 127/86    Wt Readings from Last 3 Encounters:   06/17/21 98 kg (216 lb 1.6 oz)   05/06/21 98.4 kg (217 lb)   12/11/20 97.6 kg (215 lb 3.2 oz)                    Breast Cancer Screening:  Any new diagnosis of family breast, ovarian, or bowel cancer? No    FSH-7: No flowsheet data found.  click delete button to remove this line now  Mammogram Screening: Recommended annual mammography  Pertinent mammograms are reviewed under the imaging tab.    History of abnormal Pap smear: NO - age 30- 65 PAP every 3 years recommended  PAP / HPV Latest Ref Rng & Units 4/1/2019 4/21/2014 10/2/2012   PAP - NIL NIL NIL   HPV 16 DNA NEG:Negative Negative - -   HPV 18 DNA NEG:Negative Negative - -   OTHER HR HPV NEG:Negative Negative - -     Reviewed and updated as needed this visit by clinical staff  Tobacco  Allergies    Med Hx  Surg Hx  Fam Hx  Soc Hx        Reviewed and updated as needed this visit by Provider                    Review of Systems   Constitutional: Negative for chills and fever.   HENT: Negative for congestion, ear pain, hearing loss and sore throat.    Eyes: Negative for pain and visual disturbance.   Respiratory: Negative for cough and shortness of breath.    Cardiovascular: Negative for chest pain, palpitations and peripheral edema.   Gastrointestinal: Negative for abdominal pain, constipation, diarrhea, heartburn, hematochezia and nausea.   Breasts:  Negative for tenderness, breast mass and discharge.   Genitourinary: Negative for dysuria, frequency, genital sores, hematuria, pelvic pain, urgency, vaginal bleeding and vaginal discharge.   Musculoskeletal: Negative for arthralgias, joint swelling and myalgias.   Skin: Negative for rash.   Neurological: Negative for dizziness, weakness, headaches and paresthesias.   Psychiatric/Behavioral: Negative for mood changes. The patient is  "not nervous/anxious.           OBJECTIVE:   /76   Pulse 85   Temp 98  F (36.7  C) (Tympanic)   Resp 16   Ht 1.651 m (5' 5\")   Wt 98 kg (216 lb 1.6 oz)   LMP 12/02/2012   SpO2 96%   BMI 35.96 kg/m    Physical Exam  GENERAL APPEARANCE: healthy, alert and no distress  EYES: Eyes grossly normal to inspection, PERRL and conjunctivae and sclerae normal  HENT: ear canals and TM's normal, nose and mouth without ulcers or lesions, oropharynx clear and oral mucous membranes moist  NECK: no adenopathy, no asymmetry, masses, or scars and thyroid normal to palpation  RESP: lungs clear to auscultation - no rales, rhonchi or wheezes  BREAST:right breat is a sublte 1 cm  mass along fold under breast at 6:00  Left breast normal without masses, tenderness or nipple discharge and no palpable axillary masses or adenopathy  CV: regular rate and rhythm, normal S1 S2, no S3 or S4, no murmur, click or rub, no peripheral edema and peripheral pulses strong  ABDOMEN: soft, nontender, no hepatosplenomegaly, no masses and bowel sounds normal   (female): normal female external genitalia, normal urethral meatus and vaginal mucosal atrophy noted.   MS: no musculoskeletal defects are noted and gait is age appropriate without ataxia  SKIN:  Right side of vaginal vault is a 0.5 cm brownish round flat mole  NEURO: Normal strength and tone, sensory exam grossly normal, mentation intact and speech normal  PSYCH: mentation appears normal and affect normal/bright        ASSESSMENT/PLAN:   1. Screen for colon cancer    - GASTROENTEROLOGY ADULT REF PROCEDURE ONLY; Future    2. Need for hepatitis C screening test    - Hepatitis C Screen Reflex to HCV RNA Quant and Genotype    3. Screening for blood disease    - CBC with platelets    4. Screening, lipid    - Lipid panel reflex to direct LDL Fasting    5. Screening for diabetes mellitus    - Comprehensive metabolic panel    6. Lump or mass in breast    - MA Diagnostic Digital Bilateral; " "Future  - US Breast Right Complete 4 Quadrants; Future    Patient has been advised of split billing requirements and indicates understanding: Yes  COUNSELING:  Reviewed preventive health counseling, as reflected in patient instructions    Estimated body mass index is 35.96 kg/m  as calculated from the following:    Height as of this encounter: 1.651 m (5' 5\").    Weight as of this encounter: 98 kg (216 lb 1.6 oz).    Weight management plan: Discussed healthy diet and exercise guidelines    She reports that she has quit smoking. She has never used smokeless tobacco.      Counseling Resources:  ATP IV Guidelines  Pooled Cohorts Equation Calculator  Breast Cancer Risk Calculator  BRCA-Related Cancer Risk Assessment: FHS-7 Tool  FRAX Risk Assessment  ICSI Preventive Guidelines  Dietary Guidelines for Americans, 2010  USDA's MyPlate  ASA Prophylaxis  Lung CA Screening    Ramona Ann Aaseby-Aguilera, PA-C  [facility]  "

## 2021-06-21 DIAGNOSIS — Z11.59 ENCOUNTER FOR SCREENING FOR OTHER VIRAL DISEASES: ICD-10-CM

## 2021-06-22 ENCOUNTER — HOSPITAL ENCOUNTER (OUTPATIENT)
Dept: MAMMOGRAPHY | Facility: CLINIC | Age: 51
End: 2021-06-22
Attending: PHYSICIAN ASSISTANT
Payer: COMMERCIAL

## 2021-06-22 ENCOUNTER — HOSPITAL ENCOUNTER (OUTPATIENT)
Dept: ULTRASOUND IMAGING | Facility: CLINIC | Age: 51
End: 2021-06-22
Attending: PHYSICIAN ASSISTANT
Payer: COMMERCIAL

## 2021-06-22 DIAGNOSIS — N63.0 LUMP OR MASS IN BREAST: ICD-10-CM

## 2021-06-22 PROCEDURE — 76642 ULTRASOUND BREAST LIMITED: CPT | Mod: RT

## 2021-06-22 PROCEDURE — G0279 TOMOSYNTHESIS, MAMMO: HCPCS

## 2021-06-24 DIAGNOSIS — Z11.59 ENCOUNTER FOR SCREENING FOR OTHER VIRAL DISEASES: ICD-10-CM

## 2021-06-24 LAB
LABORATORY COMMENT REPORT: NORMAL
SARS-COV-2 RNA RESP QL NAA+PROBE: NEGATIVE
SARS-COV-2 RNA RESP QL NAA+PROBE: NORMAL
SPECIMEN SOURCE: NORMAL
SPECIMEN SOURCE: NORMAL

## 2021-06-24 PROCEDURE — U0003 INFECTIOUS AGENT DETECTION BY NUCLEIC ACID (DNA OR RNA); SEVERE ACUTE RESPIRATORY SYNDROME CORONAVIRUS 2 (SARS-COV-2) (CORONAVIRUS DISEASE [COVID-19]), AMPLIFIED PROBE TECHNIQUE, MAKING USE OF HIGH THROUGHPUT TECHNOLOGIES AS DESCRIBED BY CMS-2020-01-R: HCPCS | Performed by: INTERNAL MEDICINE

## 2021-06-24 PROCEDURE — U0005 INFEC AGEN DETEC AMPLI PROBE: HCPCS | Performed by: INTERNAL MEDICINE

## 2021-06-24 RX ORDER — LIDOCAINE HYDROCHLORIDE AND EPINEPHRINE 10; 10 MG/ML; UG/ML
1.5 INJECTION, SOLUTION INFILTRATION; PERINEURAL ONCE
Status: DISCONTINUED | OUTPATIENT
Start: 2021-06-25 | End: 2021-06-25

## 2021-06-24 NOTE — PROGRESS NOTES
[facility] Dermatology Note  Encounter Date: Jun 25, 2021  Office Visit     Dermatology Problem List:  PLC/Melanoma Clinic Patient  TBP completed 3/26/21  Skin checks q6 months through 7/2023  1. History of melanoma, left back, Stage IIB (cT3b, cN0, cM0)   -Initial biopsy on 6/2019: BD at least 3.5 with ulceration and 10 mitoses/mm2  - s/p WLE with keystone flap and negative left axillary sentinel lymph node biopsy (7/18/2019) with Gwyn Raman and Tate   2. History of dysplastic nevi  -Moderately atypical nevi x2 of the medial left upper back and mid upper back, s/p biopsy 9/10/19 with pigment recurrence 12/2019, s/p excision 3/4/20  -Nevus with congenital features and junctional atypia, right upper back, s/p biopsy 3/4/20, pigment recurrence noted 6/12/20, re-excised 7/23/20  3. DF + EIC, left lateral chest, s/p excision 4/6/21  4. 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 She has two children (one in high school, one in college).  Family history: Father had unknown types of skin cancer. Patient and her father are no longer in contact for her to ask. No family history of pancreatic cancer.   ____________________________________________    Assessment & Plan:     # Melanoma, stage IIB, left back. No evidence of recurrence. Discussed risk of melanoma recurrence (with local or distant disease) and risk of additional primary melanomas. Explained routine schedule for follow up examinations in office and need for self skin checks monthly. Discussed the use of total body photography (TBP) and digital dermoscopic documentation (DDD) to catch melanomas earlier. We reviewed that this is billed to insurance, but that it is unlikely to cover the costs associated with this. If not covered, the cost to the patient will be $205. Completed today.  - ABCDs of melanoma  were discussed and self skin checks were advised.   - Sun precaution was advised including the use of sunscreens of SPF 30 or higher, sun protective clothing, and avoidance of tanning beds.  - Recommended yearly dental, gyn, and ophthalmology examinations.  - Recommended first degree relatives get yearly skin exams as well.  - Digital dermoscopy compared to when taken with TBP. No significant changes today. Mole to monitor listed below.      # History of DN: No evidence of recurrence. While these are benign, they are a marker for increased melanoma risk.  - Recommended yearly skin checks.     # Sun damaged skin with solar lentigines: Chronic, stable.  - Recommend sunscreens SPF #30 or greater, protective clothing and avoidance of tanning beds.     # Benign skin findings including: seborrheic keratoses, cherry angioma, skin tags, sebaceous hyperplasia - minor, self limited problem  - No further intervention required. Patient to report changes.   - Patient reassured of the benign nature of these lesions.     # Multiple clinically benign nevi: Chronic, stable  - No further intervention required. Patient to report changes.   - Patient reassured of the benign nature of these lesions.    # Monitor: Nevus on right lower back. Slightly darker in color today compared to dermoscopy image on photofinder but this could be difference in dermatoscopes used. Will check at next visit.     Procedures Performed:   None    Follow-up: 6 months for next skin check, sooner for concerns.    Staff:     Melisa Richardson MD    Department of Dermatology  [facility]: Phone: [phone], Fax:[phone]  [facility]: Phone: [phone], Fax: [phone]    ____________________________________________    CC: Oncology Clinic Visit (MELANOMA)    HPI:  Ms. Nancy Bronson is a(n) 51 year old female who presents today as a  return patient for skin check.    Last visit 4/6/21 with Dr. Donis for excision on left chest. This showed EIC and DF.     Today, Elpidio notes a concern about a new spot on the genital skin. It was noticed when she went in for her physical. She cannot see it to evaluate, so not sure when it first appeared.    Patient is otherwise feeling well, without additional skin concerns. Denies any fevers, chills, cough, shortness of breath, or weight loss.    Labs Reviewed:  N/A    Physical Exam:  Vitals: LMP 12/02/2012   SKIN: Full skin, which includes the head/face, both arms, chest, back, abdomen,both legs, genitalia and/or groin buttocks, digits and/or nails, was examined.  - Archuleta Type II  - Well healed flap on the upper to mid back. No pigment recurrence or nodularity.   - Scattered brown macules on sun exposed areas, particularly the shoulders.  - Three linear scars with no pigment recurrence on the back (one on the right, two on the left).  - Skin tags in axillae and at neck.  - Linear scar at left lateral chest from EIC removal.  - Scattered sebaceous hyperplasia on the face.  - There are dome shaped bright red papules on the face, scalp, and trunk.   - Multiple regular brown pigmented macules and papules. Some are larger than 6mm in size but these are uniform. Some have borders that feather off in the periphery or are patchy on dermoscopy. None have significant atypia on dermoscopic examination today. On the right lower back at wait band line, nevus is symmetric but seems a bit darker than previous dermoscopic photo. Will monitor.  - There are waxy stuck on tan to brown papules on the trunk.  - No other lesions of concern on areas examined.   LYMPH NODES: No submandibular, cervical, supraclavicular, axillary, or inguinal lymphadenopathy palpable on examination.          Medications:  Current Outpatient Medications   Medication     fluticasone (FLONASE) 50 MCG/ACT spray     hydrocortisone (WESTCORT) 0.2 %  external cream     Current Facility-Administered Medications   Medication     lidocaine 1% with EPINEPHrine 1:100,000 injection 1.5 mL     lidocaine 1% with EPINEPHrine 1:100,000 injection 1.5 mL      Past Medical History:   Patient Active Problem List   Diagnosis     Snoring     S/P hysterectomy     Anemia     Malignant melanoma of torso excluding breast (H)     Past Medical History:   Diagnosis Date     Anemia      NO ACTIVE PROBLEMS        CC Referred Self, MD  No address on file on close of this encounter.

## 2021-06-25 ENCOUNTER — OFFICE VISIT (OUTPATIENT)
Dept: SURGERY | Facility: CLINIC | Age: 51
End: 2021-06-25
Attending: DERMATOLOGY
Payer: COMMERCIAL

## 2021-06-25 VITALS
DIASTOLIC BLOOD PRESSURE: 87 MMHG | WEIGHT: 222.2 LBS | SYSTOLIC BLOOD PRESSURE: 122 MMHG | HEART RATE: 103 BPM | TEMPERATURE: 98.6 F | RESPIRATION RATE: 16 BRPM | BODY MASS INDEX: 34.88 KG/M2 | HEIGHT: 67 IN | OXYGEN SATURATION: 97 %

## 2021-06-25 DIAGNOSIS — D18.01 CHERRY ANGIOMA: ICD-10-CM

## 2021-06-25 DIAGNOSIS — L73.8 SENILE SEBACEOUS GLAND HYPERPLASIA: ICD-10-CM

## 2021-06-25 DIAGNOSIS — L81.4 SOLAR LENTIGO: ICD-10-CM

## 2021-06-25 DIAGNOSIS — L82.1 SEBORRHEIC KERATOSIS: ICD-10-CM

## 2021-06-25 DIAGNOSIS — Z85.820 HISTORY OF MELANOMA: Primary | ICD-10-CM

## 2021-06-25 DIAGNOSIS — Z86.018 HISTORY OF DYSPLASTIC NEVUS: ICD-10-CM

## 2021-06-25 DIAGNOSIS — L57.8 SUN-DAMAGED SKIN: ICD-10-CM

## 2021-06-25 DIAGNOSIS — D22.9 MULTIPLE BENIGN NEVI: ICD-10-CM

## 2021-06-25 DIAGNOSIS — L91.8 SKIN TAG: ICD-10-CM

## 2021-06-25 PROCEDURE — 96904 WHOLE BODY PHOTOGRAPHY: CPT | Mod: GA | Performed by: DERMATOLOGY

## 2021-06-25 PROCEDURE — 99213 OFFICE O/P EST LOW 20 MIN: CPT | Mod: 25 | Performed by: DERMATOLOGY

## 2021-06-25 PROCEDURE — G0463 HOSPITAL OUTPT CLINIC VISIT: HCPCS

## 2021-06-25 ASSESSMENT — PAIN SCALES - GENERAL: PAINLEVEL: NO PAIN (0)

## 2021-06-25 ASSESSMENT — MIFFLIN-ST. JEOR: SCORE: 1648.13

## 2021-06-25 NOTE — LETTER
6/25/2021         RE: Nancy Bronson  [address] [facility] [id number]        Dear Colleague,    Thank you for referring your patient, Nancy Bronson, to the [facility]. Please see a copy of my visit note below.    [facility] Dermatology Note  Encounter Date: Jun 25, 2021  Office Visit     Dermatology Problem List:  PLC/Melanoma Clinic Patient  TBP completed 3/26/21  Skin checks q6 months through 7/2023  1. History of melanoma, left back, Stage IIB (cT3b, cN0, cM0)   -Initial biopsy on 6/2019: BD at least 3.5 with ulceration and 10 mitoses/mm2  - s/p WLE with keystone flap and negative left axillary sentinel lymph node biopsy (7/18/2019) with Gwyn Raman and Tate   2. History of dysplastic nevi  -Moderately atypical nevi x2 of the medial left upper back and mid upper back, s/p biopsy 9/10/19 with pigment recurrence 12/2019, s/p excision 3/4/20  -Nevus with congenital features and junctional atypia, right upper back, s/p biopsy 3/4/20, pigment recurrence noted 6/12/20, re-excised 7/23/20  3. DF + EIC, left lateral chest, s/p excision 4/6/21  4. 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 She has two children (one in high school, one in college).  Family history: Father had unknown types of skin cancer. Patient and her father are no longer in contact for her to ask. No family history of pancreatic cancer.   ____________________________________________    Assessment & Plan:     # Melanoma, stage IIB, left back. No evidence of recurrence. Discussed risk of melanoma recurrence (with local or distant disease) and risk of additional primary melanomas. Explained routine schedule for follow up examinations in office and need for self skin checks monthly. Discussed the use of total body photography (TBP) and digital  dermoscopic documentation (DDD) to catch melanomas earlier. We reviewed that this is billed to insurance, but that it is unlikely to cover the costs associated with this. If not covered, the cost to the patient will be $205. Completed today.  - ABCDs of melanoma were discussed and self skin checks were advised.   - Sun precaution was advised including the use of sunscreens of SPF 30 or higher, sun protective clothing, and avoidance of tanning beds.  - Recommended yearly dental, gyn, and ophthalmology examinations.  - Recommended first degree relatives get yearly skin exams as well.  - Digital dermoscopy compared to when taken with TBP. No significant changes today. Mole to monitor listed below.      # History of DN: No evidence of recurrence. While these are benign, they are a marker for increased melanoma risk.  - Recommended yearly skin checks.     # Sun damaged skin with solar lentigines: Chronic, stable.  - Recommend sunscreens SPF #30 or greater, protective clothing and avoidance of tanning beds.     # Benign skin findings including: seborrheic keratoses, cherry angioma, skin tags, sebaceous hyperplasia - minor, self limited problem  - No further intervention required. Patient to report changes.   - Patient reassured of the benign nature of these lesions.     # Multiple clinically benign nevi: Chronic, stable  - No further intervention required. Patient to report changes.   - Patient reassured of the benign nature of these lesions.    # Monitor: Nevus on right lower back. Slightly darker in color today compared to dermoscopy image on photofinder but this could be difference in dermatoscopes used. Will check at next visit.     Procedures Performed:   None    Follow-up: 6 months for next skin check, sooner for concerns.    Staff:     Melisa Richardson MD    Department of Dermatology  [facility]: Phone: [phone],  Fax:[phone]  [facility]: Phone: [phone], Fax: [phone]    ____________________________________________    CC: Oncology Clinic Visit (MELANOMA)    HPI:  Ms. Nancy Bronson is a(n) 51 year old female who presents today as a return patient for skin check.    Last visit 4/6/21 with Dr. Donis for excision on left chest. This showed EIC and DF.     Today, Elpidio notes a concern about a new spot on the genital skin. It was noticed when she went in for her physical. She cannot see it to evaluate, so not sure when it first appeared.    Patient is otherwise feeling well, without additional skin concerns. Denies any fevers, chills, cough, shortness of breath, or weight loss.    Labs Reviewed:  N/A    Physical Exam:  Vitals: LMP 12/02/2012   SKIN: Full skin, which includes the head/face, both arms, chest, back, abdomen,both legs, genitalia and/or groin buttocks, digits and/or nails, was examined.  - Archuleta Type II  - Well healed flap on the upper to mid back. No pigment recurrence or nodularity.   - Scattered brown macules on sun exposed areas, particularly the shoulders.  - Three linear scars with no pigment recurrence on the back (one on the right, two on the left).  - Skin tags in axillae and at neck.  - Linear scar at left lateral chest from EIC removal.  - Scattered sebaceous hyperplasia on the face.  - There are dome shaped bright red papules on the face, scalp, and trunk.   - Multiple regular brown pigmented macules and papules. Some are larger than 6mm in size but these are uniform. Some have borders that feather off in the periphery or are patchy on dermoscopy. None have significant atypia on dermoscopic examination today. On the right lower back at wait band line, nevus is symmetric but seems a bit darker than previous dermoscopic photo. Will monitor.  - There are waxy stuck on tan to brown papules on the trunk.  - No other lesions of concern on areas  examined.   LYMPH NODES: No submandibular, cervical, supraclavicular, axillary, or inguinal lymphadenopathy palpable on examination.          Medications:  Current Outpatient Medications   Medication     fluticasone (FLONASE) 50 MCG/ACT spray     hydrocortisone (WESTCORT) 0.2 % external cream     Current Facility-Administered Medications   Medication     lidocaine 1% with EPINEPHrine 1:100,000 injection 1.5 mL     lidocaine 1% with EPINEPHrine 1:100,000 injection 1.5 mL      Past Medical History:   Patient Active Problem List   Diagnosis     Snoring     S/P hysterectomy     Anemia     Malignant melanoma of torso excluding breast (H)     Past Medical History:   Diagnosis Date     Anemia      NO ACTIVE PROBLEMS        CC Referred Self, MD  No address on file on close of this encounter.                Again, thank you for allowing me to participate in the care of your patient.        Sincerely,        Melisa Richardson MD

## 2021-06-25 NOTE — NURSING NOTE
"Oncology Rooming Note    June 25, 2021 1:05 PM   Nancy Bronson is a 51 year old female who presents for:    Chief Complaint   Patient presents with     Oncology Clinic Visit     MELANOMA     Initial Vitals: /87 (BP Location: Left arm, Patient Position: Sitting, Cuff Size: Adult Large)   Pulse 103   Temp 98.6  F (37  C) (Oral)   Resp 16   Ht 1.69 m (5' 6.54\")   Wt 100.8 kg (222 lb 3.2 oz)   LMP 12/02/2012   SpO2 97%   BMI 35.29 kg/m   Estimated body mass index is 35.29 kg/m  as calculated from the following:    Height as of this encounter: 1.69 m (5' 6.54\").    Weight as of this encounter: 100.8 kg (222 lb 3.2 oz). Body surface area is 2.18 meters squared.  No Pain (0) Comment: Data Unavailable   Patient's last menstrual period was 12/02/2012.  Allergies reviewed: Yes  Medications reviewed: Yes    Medications: Medication refills not needed today.  Pharmacy name entered into [facility]:    [facility] [place], [place] - [address]  [id number] - [place], [place] - [address]    Clinical concerns: Mole on labia.        Emy Llamas CMA              "

## 2021-06-28 ENCOUNTER — HOSPITAL ENCOUNTER (OUTPATIENT)
Facility: CLINIC | Age: 51
Discharge: HOME | End: 2021-06-28
Attending: INTERNAL MEDICINE | Admitting: INTERNAL MEDICINE
Payer: COMMERCIAL

## 2021-06-28 VITALS
SYSTOLIC BLOOD PRESSURE: 125 MMHG | OXYGEN SATURATION: 97 % | RESPIRATION RATE: 16 BRPM | DIASTOLIC BLOOD PRESSURE: 84 MMHG | HEART RATE: 90 BPM

## 2021-06-28 LAB — COLONOSCOPY: NORMAL

## 2021-06-28 PROCEDURE — G0121 COLON CA SCRN NOT HI RSK IND: HCPCS | Performed by: INTERNAL MEDICINE

## 2021-06-28 PROCEDURE — 250N000013 HC RX MED GY IP 250 OP 250 PS 637: Performed by: INTERNAL MEDICINE

## 2021-06-28 PROCEDURE — 99153 MOD SED SAME PHYS/QHP EA: CPT | Performed by: INTERNAL MEDICINE

## 2021-06-28 PROCEDURE — 250N000011 HC RX IP 250 OP 636: Performed by: INTERNAL MEDICINE

## 2021-06-28 PROCEDURE — G0500 MOD SEDAT ENDO SERVICE >5YRS: HCPCS | Performed by: INTERNAL MEDICINE

## 2021-06-28 PROCEDURE — 45378 DIAGNOSTIC COLONOSCOPY: CPT | Performed by: INTERNAL MEDICINE

## 2021-06-28 RX ORDER — ONDANSETRON 2 MG/ML
4 INJECTION INTRAMUSCULAR; INTRAVENOUS
Status: DISCONTINUED | OUTPATIENT
Start: 2021-06-28 | End: 2021-06-28 | Stop reason: HOSPADM

## 2021-06-28 RX ORDER — SIMETHICONE 40MG/0.6ML
SUSPENSION, DROPS(FINAL DOSAGE FORM)(ML) ORAL PRN
Status: COMPLETED | OUTPATIENT
Start: 2021-06-28 | End: 2021-06-28

## 2021-06-28 RX ORDER — LIDOCAINE 40 MG/G
CREAM TOPICAL
Status: DISCONTINUED | OUTPATIENT
Start: 2021-06-28 | End: 2021-06-28 | Stop reason: HOSPADM

## 2021-06-28 RX ORDER — FENTANYL CITRATE 50 UG/ML
INJECTION, SOLUTION INTRAMUSCULAR; INTRAVENOUS PRN
Status: COMPLETED | OUTPATIENT
Start: 2021-06-28 | End: 2021-06-28

## 2021-06-28 RX ADMIN — MIDAZOLAM 2 MG: 1 INJECTION INTRAMUSCULAR; INTRAVENOUS at 10:56

## 2021-06-28 RX ADMIN — MIDAZOLAM 2 MG: 1 INJECTION INTRAMUSCULAR; INTRAVENOUS at 11:02

## 2021-06-28 RX ADMIN — FENTANYL CITRATE 50 MCG: 50 INJECTION, SOLUTION INTRAMUSCULAR; INTRAVENOUS at 11:07

## 2021-06-28 RX ADMIN — Medication 133 MG: at 11:12

## 2021-06-28 RX ADMIN — FENTANYL CITRATE 100 MCG: 50 INJECTION, SOLUTION INTRAMUSCULAR; INTRAVENOUS at 10:56

## 2021-06-28 NOTE — LETTER
June 21, 2021      Nancy Bronson  [address] [facility] [id number]        Dear Nancy,     Please be aware that coverage of these services is subject to the terms and limitations of your health insurance plan.  Call member services at your health plan with any benefit or coverage questions.    Thank you for choosing [facility]. You are scheduled for the following service(s):    Date:  6/28/2021             Procedure:  COLONOSCOPY  Doctor:        Dr. Avelar   Arrival Time:  10:10  *Enter and check in at the Main Hospital Entrance*  Procedure Time:  10:40      Location:   [facility]        Endoscopy Department, First Floor         [address] [place], [place] [address]      [phone] or [phone] ([facility]) to reschedule          MIRALAX -GATORADE  PREP  Colonoscopy is the most accurate test to detect colon polyps and colon cancer; and the only test where polyps can be removed. During this procedure, a doctor examines the lining of your large intestine and rectum through a flexible tube.   Transportation  You must arrange for a ride for the day of your procedure with a responsible adult. A taxi , Uber, etc, is not an option unless you are accompanied by a responsible adult. If you fail to arrange transportation with a responsible adult, your procedure will be cancelled and rescheduled.    Purchase the  following supplies at your local pharmacy:  - 2 (two) bisacodyl tablets: each tablet contains 5 mg.  (Dulcolax  laxative NOT Dulcolax  stool softener)   - 1 (one) 8.3 oz bottle of Polyethylene Glycol (PEG) 3350 Powder   (MiraLAX , Smooth LAX , ClearLAX  or equivalent)  - 64 oz Gatorade    Regular Gatorade, Gatorade G2 , Powerade , Powerade Zero  or Pedialyte  is acceptable. Red colored flavors are not allowed; all other colors (yellow, green, orange, purple and blue) are okay. It is also okay to buy two 2.12 oz packets of powdered Gatorade that can  be mixed with water to a total volume of 64 oz of liquid.  - 1 (one) 10 oz bottle of Magnesium Citrate (Red colored flavors are not allowed)  It is also okay for you to use a 0.5 oz package of powdered magnesium citrate (17 g) mixed with 10 oz of water.      PREPARATION FOR COLONOSCOPY    7 days before:    Discontinue fiber supplements and medications containing iron. This includes Metamucil  and Fibercon ; and multivitamins with iron.    3 days before:    Begin a low-fiber diet. A low-fiber diet helps making the cleanout more effective.     Examples of a low-fiber diet include (but are not limited to): white bread, white rice, pasta, crackers, fish, chicken, eggs, ground beef, creamy peanut butter, cooked/steamed/boiled vegetables, canned fruit, bananas, melons, milk, plain yogurt cheese, salad dressing and other condiments.     The following are not allowed on a low-fiber diet: seeds, nuts, popcorn, bran, whole wheat, corn, quinoa, raw fruits and vegetables, berries and dried fruit, beans and lentils.    For additional details on low-fiber diet, please refer to the table on the last page.    2 days before:    Continue the low-fiber diet.     Drink at least 8 glasses of water throughout the day.     Stop eating solid foods at 11:45 pm.    1 day before:    In the morning: begin a clear liquid diet (liquids you can see through).     Examples of a clear liquid diet include: water, clear broth or bouillon, Gatorade, Pedialyte or Powerade, carbonated and non-carbonated soft drinks (Sprite , 7-Up , ginger ale), strained fruit juices without pulp (apple, white grape, white cranberry), Jell-O  and popsicles.     The following are not allowed on a clear liquid diet: red liquids, alcoholic beverages, dairy products (milk, creamer, and yogurt), protein shakes, creamy broths, juice with pulp and chewing tobacco.    At noon: take 2 (two) bisacodyl tablets     At 4 (and no later than 6pm): start drinking the Miralax-Gatorade  preparation (8.3 oz of Miralax mixed with 64 oz of Gatorade in a large pitcher). Drink 1(one) 8 oz glass every 15 minutes thereafter, until the mixture is gone.    COLON CLEANSING TIPS: drink adequate amounts of fluids before and after your colon cleansing to prevent dehydration. Stay near a toilet because you will have diarrhea. Even if you are sitting on the toilet, continue to drink the cleansing solution every 15 minutes. If you feel nauseous or vomit, rinse your mouth with water, take a 15 to 30-minute-break and then continue drinking the solution. You will be uncomfortable until the stool has flushed from your colon (in about 2 to 4 hours). You may feel chilled.    Day of your procedure  You may take all of your morning medications including blood pressure medications, blood thinners (if you have not been instructed to stop these by our office), methadone, anti-seizure medications with sips of water 3 hours prior to your procedure or earlier. Do not take insulin or vitamins prior to your procedure. Continue the clear liquid diet.       4 hours prior: drink 10 oz of magnesium citrate. It may be easier to drink it with a straw.    STOP consuming all liquids after that.     Do not take anything by mouth during this time.     Allow extra time to travel to your procedure as you may need to stop and use a restroom along the way.    You are ready for the procedure, if you followed all instructions and your stool is no longer formed, but clear or yellow liquid. If you are unsure whether your colon is clean, please call our office at [phone] before you leave for your appointment.    Bring the following to your procedure:  - Insurance Card/Photo ID.   - List of current medications including over-the-counter medications and supplements.   - Your rescue inhaler if you currently use one to control asthma.    Canceling or rescheduling your appointment:   If you must cancel or reschedule your appointment, please call  [phone] as soon as possible.      COLONOSCOPY PRE-PROCEDURE CHECKLIST    If you have diabetes, ask your regular doctor for diet and medication restrictions.  If you take an anticoagulant or anti-platelet medication (such as Coumadin , Lovenox , Pradaxa , Xarelto , Eliquis , etc.), please call your primary doctor for advice on holding this medication.  If you take aspirin you may continue to do so.  If you are or may be pregnant, please discuss the risks and benefits of this procedure with your doctor.        What happens during a colonoscopy?    Plan to spend up to two hours, starting at registration time, at the endoscopy center the day of your procedure. The colonoscopy takes an average of 15 to 30 minutes. Recovery time is about 30 minutes.      Before the exam:    You will change into a gown.    Your medical history and medication list will be reviewed with you, unless that has been done over the phone prior to the procedure.     A nurse will insert an intravenous (IV) line into your hand or arm.    The doctor will meet with you and will give you a consent form to sign.  During the exam:     Medicine will be given through the IV line to help you relax.     Your heart rate and oxygen levels will be monitored. If your blood pressure is low, you may be given fluids through the IV line.     The doctor will insert a flexible hollow tube, called a colonoscope, into your rectum. The scope will be advanced slowly through the large intestine (colon).    You may have a feeling of fullness or pressure.     If an abnormal tissue or a polyp is found, the doctor may remove it through the endoscope for closer examination, or biopsy. Tissue removal is painless    After the exam:           Any tissue samples removed during the exam will be sent to a lab for evaluation. It may take 5-7 working days for you to be notified of the results.     A nurse will provide you with complete discharge instructions before you leave the  endoscopy center. Be sure to ask the nurse for specific instructions if you take blood thinners such as Aspirin, Coumadin or Plavix.     The doctor will prepare a full report for you and for the physician who referred you for the procedure.     Your doctor will talk with you about the initial results of your exam.      Medication given during the exam will prohibit you from driving for the rest of the day.     Following the exam, you may resume your normal diet. Your first meal should be light, no greasy foods. Avoid alcohol until the next day.     You may resume your regular activities the day after the procedure.         LOW-FIBER DIET    Foods RECOMMENDED Foods to AVOID   Breads, Cereal, Rice and Pasta:   White bread, rolls, biscuits, croissant and amee toast.   Waffles, Kittitian toast and pancakes.   White rice, noodles, pasta, macaroni and peeled cooked potatoes.   Plain crackers and saltines.   Cooked cereals: farina, cream of rice.   Cold cereals: Puffed Rice , Rice Krispies , Corn Flakes  and Special K    Breads, Cereal, Rice and Pasta:   Breads or rolls with nuts, seeds or fruit.   Whole wheat, pumpernickel, rye breads and cornbread.   Potatoes with skin, brown or wild rice, and kasha (buckwheat).     Vegetables:   Tender cooked and canned vegetables without seeds: carrots, asparagus tips, green or wax beans, pumpkin, spinach, lima beans. Vegetables:   Raw or steamed vegetables.   Vegetables with seeds.   Sauerkraut.   Winter squash, peas, broccoli, Brussel sprouts, cabbage, onions, cauliflower, baked beans, peas and corn.   Fruits:   Strained fruit juice.   Canned fruit, except pineapple.   Ripe bananas and melon. Fruits:   Prunes and prune juice.   Raw fruits.   Dried fruits: figs, dates and raisins.   Milk/Dairy:   Milk: plain or flavored.   Yogurt, custard and ice cream.   Cheese and cottage cheese Milk/Dairy:     Meat and other proteins:   ground, well-cooked tender beef, lamb, ham, veal, pork, fish,  poultry and organ meats.   Eggs.   Peanut butter without nuts. Meat and other proteins:   Tough, fibrous meats with gristle.   Dry beans, peas and lentils.   Peanut butter with nuts.   Tofu.   Fats, Snack, Sweets, Condiments and Beverages:   Margarine, butter, oils, mayonnaise, sour cream and salad dressing, plain gravy.   Sugar, hard candy, clear jelly, honey and syrup.   Spices, cooked herbs, bouillon, broth and soups made with allowed vegetable, ketchup and mustard.   Coffee, tea and carbonated drinks.   Plain cakes, cookies and pretzels.   Gelatin, plain puddings, custard, ice cream, sherbet and popsicles. Fats, Snack, Sweets, Condiments and Beverages:   Nuts, seeds and coconut.   Jam, marmalade and preserves.   Pickles, olives, relish and horseradish.   All desserts containing nuts, seeds, dried fruit and coconut; or made from whole grains or bran.   Candy made with nuts or seeds.   Popcorn.         DIRECTIONS TO THE ENDOSCOPY DEPARTMENT    From the north ([facility])  Take [address] South, exit on [address] [address]. Get into the left hand lit, turn left (east), go one-half mile to [address] and turn left. Go north to the second stoplight, take a right on [address] and follow it to the Main Hospital entrance.    From the south ([facility])  Take 35N to the 35E split and exit on [address] [address]. On [address] [address], turn left (west) to [address]. Turn right (north) on [address]. Go north to the second stoplight, take a right on [address] and follow it to the Main Hospital entrance.    From the east via 35E ([facility])  Take 35E south to [address] [address] exit. Turn right on [address] [address]. Go west to [address]. Turn right (north) on [address]. Go to the second stoplight, take a right on [address] to the Main Hospital entrance.    From the east via [address] ([facility])  Take [address] to [address]. Turn left  (south) on [address] to [address], turn left (east) on [address] and follow it to the Main Hospital entrance.    From the west via [address] ([place])  Take [address] east to [address]. Turn right (south) on [address] to [address], turn left (east) on [address] and follow it to the Main Hospital entrance.

## 2021-09-18 ENCOUNTER — HEALTH MAINTENANCE LETTER (OUTPATIENT)
Age: 51
End: 2021-09-18

## 2021-11-09 NOTE — PROGRESS NOTES
NEW CONSULTATION   Nov 10, 2021     Nancy Bronson is a 51 year old woman who presents with history of melanoma.     HPI:    She underwent a wide local excision of left back melanoma and left axillary sentinel lymph node biopsy 7/18/19 with Dr. Raman (Stage IIB).    Since her last visit, she has been doing well. She denies any headaches, dizziness, vision changes, abdominal pain, bowel habit changes, rectal bleeding, melena, chest pain, shortness of breath, or unintentional weight loss.  She has not noted any masses in either axilla bilaterally. She has good range of motion of her shoulders bilaterally and denies any upper extremity swelling.     She did have dysplastic nevi that have been excised on the back in March 2020 and July 2020.     Her last skin check was performed by Dr Richardson on 6/25/21.  No concerning lesions were found. Her next check is planned for [facility]    PAST MEDICAL HISTORY  Past Medical History:   Diagnosis Date     Anemia      NO ACTIVE PROBLEMS      PAST SURGICAL HISTORY   Past Surgical History:   Procedure Laterality Date     ABDOMEN SURGERY  2012    hysterectomy     BIOPSY NODE SENTINEL Left 7/18/2019    Procedure: [place] Lymph Node Mapping and Biopsy;  Surgeon: Susan Raman MD;  Location:  OR     C C-SEC ONLY,PREV C-SEC  2001,2004    2     COLONOSCOPY N/A 6/28/2021    Procedure: COLONOSCOPY (fv) - would like prep emailed;  Surgeon: Cris Avelar MD;  Location:  GI     DAVINCI HYSTERECTOMY SUPRACERVICAL  12/4/2012    benign pathProcedure: DAVINCI HYSTERECTOMY SUPRACERVICAL;  Davinci Supracervical Hysterectomy with Bilateral Salpingectomy, Cystoscopy ;  Surgeon: Selene Cardenas DO;  Location:  OR     ENT SURGERY  1975     EXCISE LESION BACK N/A 7/18/2019    Procedure: Wide Local Excision of Back Melanoma;  Surgeon: Susan Raman MD;  Location:  OR     HEAD & NECK SURGERY  1986    jaw surgery     LAPAROSCOPIC TUBAL LIGATION  2007     PROCEDURE  PLACEHOLDER PLASTIC N/A 7/18/2019    Procedure: Back Reconstruction With Local Tissue Rearrangement;  Surgeon: DIPAK Ybarra MD;  Location:  OR     [facility] NONSPECIFIC PROCEDURE  1975    tonsils     [facility] NONSPECIFIC PROCEDURE  1985    jaw surgery     MEDICATIONS  Current Outpatient Medications   Medication Sig Dispense Refill     fluticasone (FLONASE) 50 MCG/ACT spray Spray 1 spray into both nostrils 2 times daily as needed for rhinitis or allergies 16 g 1     hydrocortisone (WESTCORT) 0.2 % external cream Apply topically 2 times daily 15 g 2      ALLERGIES   No Known Allergies     SOCIAL HISTORY:  Smokes: No  EtOH: No  Wearing sunscreen and limiting sun exposure.     ROS:  Change in vision No  Headaches: no  Respiratory: No shortness of breath, dyspnea on exertion, cough, or hemoptysis   Cardiovascular: negative   Gastrointestinal: negative Abdominal pain: no  Musculoskeletal: negative Joint pain No Back pain: no  Psychiatric: negative  Hematologic/Lymphatic/Immunologic: negative  Endocrine: negative    EXAM  /85 (BP Location: Right arm, Patient Position: Sitting, Cuff Size: Adult Large)   Pulse 87   Temp 98.4  F (36.9  C) (Oral)   Resp 16   Wt 100.7 kg (221 lb 14.4 oz)   LMP 12/02/2012   SpO2 98%   BMI 35.24 kg/m     PHYSICAL EXAM  Respiratory: breathing non labored.   Skin: back wide local excision site unremarkable. Centrally there is an additional scar from dysplastic nevus resection. No nodularity, pigmentation, or satelitosis.   No clavicular, cervical, axillary or inguinal lymphadenopathy.     ASSESSMENT/PLAN:    Nancy Bronson is a 51 year old woman with truncal melanoma, nearly 2 years s/p surgery.     She is doing well without signs or symptoms of recurrence.     We reviewed that surveillance involves a history and physical exam every 6 months for the first 5 years, then annually, with imaging studies only indicated if symptoms arise.   We reviewed the signs and symptoms that could  arise in the setting of recurrent locoregional or metastatic disease. In addition, she will need to undergo regular dermatologic full skin survey and evaluation given that patients with a diagnosis of melanoma are at risk of recurrence (local and distant) and of subsequent de sherrill melanoma.  I also reviewed the importance of protection from sun exposure, including wearing long sleeves, hats, etc and also the use of sunscreen with SPF of at least 35, with frequent re-applications.      PLAN:  1. Follow up with Dr Richardson as scheduled  2. Follow up in 6 months    Bekah Crawford PA-C    20 minutes spent on the date of the encounter doing chart review, review of test results, interpretation of tests, patient visit and documentation.

## 2021-11-10 ENCOUNTER — OFFICE VISIT (OUTPATIENT)
Dept: SURGERY | Facility: CLINIC | Age: 51
End: 2021-11-10
Attending: PHYSICIAN ASSISTANT
Payer: COMMERCIAL

## 2021-11-10 VITALS
DIASTOLIC BLOOD PRESSURE: 85 MMHG | SYSTOLIC BLOOD PRESSURE: 125 MMHG | WEIGHT: 221.9 LBS | BODY MASS INDEX: 35.24 KG/M2 | OXYGEN SATURATION: 98 % | RESPIRATION RATE: 16 BRPM | TEMPERATURE: 98.4 F | HEART RATE: 87 BPM

## 2021-11-10 DIAGNOSIS — C43.59 MALIGNANT MELANOMA OF TORSO EXCLUDING BREAST (H): ICD-10-CM

## 2021-11-10 PROCEDURE — 99213 OFFICE O/P EST LOW 20 MIN: CPT | Performed by: PHYSICIAN ASSISTANT

## 2021-11-10 PROCEDURE — G0463 HOSPITAL OUTPT CLINIC VISIT: HCPCS

## 2021-11-10 ASSESSMENT — PAIN SCALES - GENERAL: PAINLEVEL: NO PAIN (0)

## 2021-11-10 NOTE — NURSING NOTE
"Oncology Rooming Note    November 10, 2021 10:58 AM   Nancy Bronson is a 51 year old female who presents for:    Chief Complaint   Patient presents with     Oncology Clinic Visit     MALIGNANT MELANOMA OF TORSO EXCLUDING BREAST     Initial Vitals: /85 (BP Location: Right arm, Patient Position: Sitting, Cuff Size: Adult Large)   Pulse 87   Temp 98.4  F (36.9  C) (Oral)   Resp 16   Wt 100.7 kg (221 lb 14.4 oz)   LMP 12/02/2012   SpO2 98%   BMI 35.24 kg/m   Estimated body mass index is 35.24 kg/m  as calculated from the following:    Height as of 6/25/21: 1.69 m (5' 6.54\").    Weight as of this encounter: 100.7 kg (221 lb 14.4 oz). Body surface area is 2.17 meters squared.  No Pain (0) Comment: Data Unavailable   Patient's last menstrual period was 12/02/2012.  Allergies reviewed: Yes  Medications reviewed: Yes    Medications: Medication refills not needed today.  Pharmacy name entered into [facility]:    [facility] [place], [place] - [address]  [id number] - [place], [place] - [address]  [address]ES    Clinical concerns: None.        Emy Llamas CMA            "

## 2021-11-10 NOTE — PATIENT INSTRUCTIONS
Nancy Bronson is a 51 year old woman with truncal melanoma, nearly 2 years s/p surgery.     She is doing well without signs or symptoms of recurrence.     We reviewed that surveillance involves a history and physical exam every 6 months for the first 5 years, then annually, with imaging studies only indicated if symptoms arise.   We reviewed the signs and symptoms that could arise in the setting of recurrent locoregional or metastatic disease. In addition, she will need to undergo regular dermatologic full skin survey and evaluation given that patients with a diagnosis of melanoma are at risk of recurrence (local and distant) and of subsequent de sherrill melanoma.  I also reviewed the importance of protection from sun exposure, including wearing long sleeves, hats, etc and also the use of sunscreen with SPF of at least 35, with frequent re-applications.      PLAN:  1. Follow up with Dr Richardson as scheduled  2. Follow up in 6 months

## 2021-11-10 NOTE — LETTER
11/10/2021         RE: Nancy Bronson  [address] [facility] [id number]        Dear Colleague,    Thank you for referring your patient, Nancy Bronson, to the [facility]. Please see a copy of my visit note below.    NEW CONSULTATION   Nov 10, 2021     Nancy Bronson is a 51 year old woman who presents with history of melanoma.     HPI:    She underwent a wide local excision of left back melanoma and left axillary sentinel lymph node biopsy 7/18/19 with Dr. Raman (Stage IIB).    Since her last visit, she has been doing well. She denies any headaches, dizziness, vision changes, abdominal pain, bowel habit changes, rectal bleeding, melena, chest pain, shortness of breath, or unintentional weight loss.  She has not noted any masses in either axilla bilaterally. She has good range of motion of her shoulders bilaterally and denies any upper extremity swelling.     She did have dysplastic nevi that have been excised on the back in March 2020 and July 2020.     Her last skin check was performed by Dr Richardson on 6/25/21.  No concerning lesions were found. Her next check is planned for [facility]    PAST MEDICAL HISTORY  Past Medical History:   Diagnosis Date     Anemia      NO ACTIVE PROBLEMS      PAST SURGICAL HISTORY   Past Surgical History:   Procedure Laterality Date     ABDOMEN SURGERY  2012    hysterectomy     BIOPSY NODE SENTINEL Left 7/18/2019    Procedure: [place] Lymph Node Mapping and Biopsy;  Surgeon: Susan Raman MD;  [facility] OR     C C-SEC ONLY,PREV C-SEC  2001,2004    2     COLONOSCOPY N/A 6/28/2021    Procedure: COLONOSCOPY (fv) - would like prep emailed;  Surgeon: Cris Avelar MD;  Location:  GI     DAVINCI HYSTERECTOMY SUPRACERVICAL  12/4/2012    benign pathProcedure: DAVINCI HYSTERECTOMY SUPRACERVICAL;  Davinci Supracervical Hysterectomy with Bilateral Salpingectomy, Cystoscopy ;  Surgeon: Selene Cardenas DO;  Location:  OR     ENT  SURGERY  1975     EXCISE LESION BACK N/A 7/18/2019    Procedure: Wide Local Excision of Back Melanoma;  Surgeon: Susan Raman MD;  Location: [facility] OR     HEAD & NECK SURGERY  1986    jaw surgery     LAPAROSCOPIC TUBAL LIGATION  2007     PROCEDURE PLACEHOLDER PLASTIC N/A 7/18/2019    Procedure: Back Reconstruction With Local Tissue Rearrangement;  Surgeon: DIPAK Ybarra MD;  Location:  OR     [facility] NONSPECIFIC PROCEDURE  1975    tonsils     [facility] NONSPECIFIC PROCEDURE  1985    jaw surgery     MEDICATIONS  Current Outpatient Medications   Medication Sig Dispense Refill     fluticasone (FLONASE) 50 MCG/ACT spray Spray 1 spray into both nostrils 2 times daily as needed for rhinitis or allergies 16 g 1     hydrocortisone (WESTCORT) 0.2 % external cream Apply topically 2 times daily 15 g 2      ALLERGIES   No Known Allergies     SOCIAL HISTORY:  Smokes: No  EtOH: No  Wearing sunscreen and limiting sun exposure.     ROS:  Change in vision No  Headaches: no  Respiratory: No shortness of breath, dyspnea on exertion, cough, or hemoptysis   Cardiovascular: negative   Gastrointestinal: negative Abdominal pain: no  Musculoskeletal: negative Joint pain No Back pain: no  Psychiatric: negative  Hematologic/Lymphatic/Immunologic: negative  Endocrine: negative    EXAM  /85 (BP Location: Right arm, Patient Position: Sitting, Cuff Size: Adult Large)   Pulse 87   Temp 98.4  F (36.9  C) (Oral)   Resp 16   Wt 100.7 kg (221 lb 14.4 oz)   LMP 12/02/2012   SpO2 98%   BMI 35.24 kg/m     PHYSICAL EXAM  Respiratory: breathing non labored.   Skin: back wide local excision site unremarkable. Centrally there is an additional scar from dysplastic nevus resection. No nodularity, pigmentation, or satelitosis.   No clavicular, cervical, axillary or inguinal lymphadenopathy.     ASSESSMENT/PLAN:    Nancy Bronson is a 51 year old woman with truncal melanoma, nearly 2 years s/p surgery.     She is doing well without signs or  symptoms of recurrence.     We reviewed that surveillance involves a history and physical exam every 6 months for the first 5 years, then annually, with imaging studies only indicated if symptoms arise.   We reviewed the signs and symptoms that could arise in the setting of recurrent locoregional or metastatic disease. In addition, she will need to undergo regular dermatologic full skin survey and evaluation given that patients with a diagnosis of melanoma are at risk of recurrence (local and distant) and of subsequent de sherrill melanoma.  I also reviewed the importance of protection from sun exposure, including wearing long sleeves, hats, etc and also the use of sunscreen with SPF of at least 35, with frequent re-applications.      PLAN:  1. Follow up with Dr Richardson as scheduled  2. Follow up in 6 months    Bekah Crawford PA-C    20 minutes spent on the date of the encounter doing chart review, review of test results, interpretation of tests, patient visit and documentation.

## 2022-01-07 ENCOUNTER — OFFICE VISIT (OUTPATIENT)
Dept: SURGERY | Facility: CLINIC | Age: 52
End: 2022-01-07
Attending: DERMATOLOGY
Payer: COMMERCIAL

## 2022-01-07 VITALS
SYSTOLIC BLOOD PRESSURE: 128 MMHG | BODY MASS INDEX: 34.12 KG/M2 | WEIGHT: 217.4 LBS | HEIGHT: 67 IN | DIASTOLIC BLOOD PRESSURE: 87 MMHG | OXYGEN SATURATION: 98 % | HEART RATE: 117 BPM | RESPIRATION RATE: 18 BRPM | TEMPERATURE: 98.3 F

## 2022-01-07 DIAGNOSIS — D22.9 MULTIPLE BENIGN NEVI: ICD-10-CM

## 2022-01-07 DIAGNOSIS — Z85.820 HISTORY OF MELANOMA: Primary | ICD-10-CM

## 2022-01-07 DIAGNOSIS — L73.8 SEBACEOUS HYPERPLASIA: ICD-10-CM

## 2022-01-07 DIAGNOSIS — Z86.018 HISTORY OF DYSPLASTIC NEVUS: ICD-10-CM

## 2022-01-07 DIAGNOSIS — L82.1 SEBORRHEIC KERATOSIS: ICD-10-CM

## 2022-01-07 DIAGNOSIS — D18.01 CHERRY ANGIOMA: ICD-10-CM

## 2022-01-07 DIAGNOSIS — L81.4 LENTIGINES: ICD-10-CM

## 2022-01-07 DIAGNOSIS — L91.8 SKIN TAG: ICD-10-CM

## 2022-01-07 PROCEDURE — 99213 OFFICE O/P EST LOW 20 MIN: CPT | Performed by: DERMATOLOGY

## 2022-01-07 PROCEDURE — G0463 HOSPITAL OUTPT CLINIC VISIT: HCPCS

## 2022-01-07 RX ORDER — LIDOCAINE HYDROCHLORIDE AND EPINEPHRINE 10; 10 MG/ML; UG/ML
1.5 INJECTION, SOLUTION INFILTRATION; PERINEURAL ONCE
Status: DISCONTINUED | OUTPATIENT
Start: 2022-01-07 | End: 2022-01-07

## 2022-01-07 ASSESSMENT — PAIN SCALES - GENERAL: PAINLEVEL: NO PAIN (0)

## 2022-01-07 ASSESSMENT — MIFFLIN-ST. JEOR: SCORE: 1626.36

## 2022-01-07 NOTE — PROGRESS NOTES
[facility] Dermatology Note  Encounter Date: Jan 7, 2022  Office Visit     Dermatology Problem List:  PLC/Melanoma Clinic Patient  TBP completed 3/26/21  Skin checks q6 months through 7/2023  1. History of melanoma, left back, Stage IIB (cT3b, cN0, cM0)   -Initial biopsy on 6/2019: BD at least 3.5 with ulceration and 10 mitoses/mm2  - s/p WLE with keystone flap and negative left axillary sentinel lymph node biopsy (7/18/2019) with Gwyn Raman and Tate   2. History of dysplastic nevi  -Moderately atypical nevi x2 of the medial left upper back and mid upper back, s/p biopsy 9/10/19 with pigment recurrence 12/2019, s/p excision 3/4/20  -Nevus with congenital features and junctional atypia, right upper back, s/p biopsy 3/4/20, pigment recurrence noted 6/12/20, re-excised 7/23/20  3. DF + EIC, left lateral chest, s/p excision 4/6/21  4. 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 She has two children ([place] in high school, Jany in college).  Family history: Father had unknown types of skin cancer. Patient and her father are no longer in contact for her to ask. No family history of pancreatic cancer.   ____________________________________________    Assessment & Plan:     # Melanoma, stage IIB, left back. No evidence of recurrence. Discussed risk of melanoma recurrence (with local or distant disease) and risk of additional primary melanomas. Explained routine schedule for follow up examinations in office and need for self skin checks monthly.   - ABCDs of melanoma were discussed and self skin checks were advised.   - Sun precaution was advised including the use of sunscreens of SPF 30 or higher, sun protective clothing, and avoidance of tanning beds.  - Recommended yearly  dental, gyn, and ophthalmology examinations.  - Recommended first degree relatives get yearly skin exams as well.  - *Reviewed last surg onc note from 11/10/21 - plan for follow up exam in 6 months.  - Digital dermoscopy compared to when taken with TBP. No significant changes today.   - Follow-up in 6 months for repeat FBSE.      # History of DN: No evidence of recurrence. While these are benign, they are a marker for increased melanoma risk.  - Recommended yearly skin checks.     # Sun damaged skin with solar lentigines: Chronic, stable.  - Recommend sunscreens SPF #30 or greater, protective clothing and avoidance of tanning beds.     # Benign skin findings including: seborrheic keratoses, cherry angioma, skin tags, sebaceous hyperplasia - minor, self limited problem  - No further intervention required. Patient to report changes.   - Patient reassured of the benign nature of these lesions.     # Multiple clinically benign nevi: Chronic, stable  - No further intervention required. Patient to report changes.   - Patient reassured of the benign nature of these lesions.    # Monitor: Nevus on right lower back. Unchanged on dermoscopy today. Will follow with dermoscopy imaging on [facility].    # EIC, right inframammary crease  At this time, the lesion would be difficult to excise. Recommended she reach out vis MyChart if the lesion becomes inflamed at which point we would consider a course of doxycycline to calm inflammation and then excision.     Procedures Performed:   None    Follow-up: 6 months for next skin check, sooner for concerns.    Staff and Scribe:        Scribe Disclosure:  I, Taisha Prasad, am serving as a scribe to document services personally performed by Melisa Richardson MD based on data collection and the provider's statements to me.     Provider Disclosure:   The documentation recorded by the scribe accurately reflects the services I personally performed and the decisions made by me.    Melisa  MD Debra    Department of Dermatology  [facility]: Phone: [phone], Fax:[phone]  [facility]: Phone: [phone], Fax: [phone]      ____________________________________________    CC: Oncology Clinic Visit (MELANOMA)    HPI:  Ms. Nancy Bronson is a(n) 51 year old female who presents today as a return patient for skin check.    Last visit 6/25/21 for skin check. At that time, a nevus at the right lower back was marked to monitor - it was slightly darker in color today [facility] but this could be difference in dermatoscopes used. Plan was to recheck at next visit.     Today, Elpidio notes a cyst on the right breast area (demonstrated on US on 6/22/21). The lesion started very firm and tender. She applied a hot compress to the area which expelled odorous drainage. It is no longer draining. Otherwise, no concerns today. No new health changes or concerns.     Patient is otherwise feeling well, without additional skin concerns. Denies any fevers, chills, cough, shortness of breath, or weight loss.    Labs Reviewed:  N/A    Physical Exam:  Vitals: LMP 12/02/2012   SKIN: Total skin, which includes the head/face, both arms, chest, back, abdomen,both legs, buttocks, digits and/or nails, was examined.  - Archuleta Type II  - Well healed flap on the upper to mid back. No pigment recurrence or nodularity.   - Scattered brown macules on sun exposed areas, particularly the shoulders.  - Three linear scars with no pigment recurrence on the back (one on the right, two on the left).  - Skin tags in axillae and at neck.  - Linear scar at left lateral chest from EIC removal.  - Scattered sebaceous hyperplasia on the face.  - There are dome shaped bright red papules on the face, scalp, and trunk.   - Multiple regular brown pigmented macules and papules. Some  are larger than 6mm in size but these are uniform. Some have borders that feather off in the periphery or are patchy on dermoscopy. None have significant atypia on dermoscopic examination today. On the right lower back at wait band line, nevus is symmetric and unchanged on dermoscopy from last visit. Will continue monitor.  - There are waxy stuck on tan to brown papules on the trunk.  - There is a visible pore in the right inframammary crease with associated firmness.   - No other lesions of concern on areas examined.   LYMPH NODES: No submandibular, cervical, supraclavicular, axillary, or inguinal lymphadenopathy palpable on examination.          Medications:  Current Outpatient Medications   Medication     fluticasone (FLONASE) 50 MCG/ACT spray     hydrocortisone (WESTCORT) 0.2 % external cream     Current Facility-Administered Medications   Medication     lidocaine 1% with EPINEPHrine 1:100,000 injection 1.5 mL     lidocaine 1% with EPINEPHrine 1:100,000 injection 1.5 mL      Past Medical History:   Patient Active Problem List   Diagnosis     Snoring     S/P hysterectomy     Anemia     Malignant melanoma of torso excluding breast (H)     Past Medical History:   Diagnosis Date     Anemia      NO ACTIVE PROBLEMS        CC Referred Self, MD  No address on file on close of this encounter.

## 2022-01-07 NOTE — NURSING NOTE
"Oncology Rooming Note    January 7, 2022 3:42 PM   Nancy Bronson is a 51 year old female who presents for:    Chief Complaint   Patient presents with     Oncology Clinic Visit     MELANOMA     Initial Vitals: /87 (BP Location: Right arm, Patient Position: Sitting, Cuff Size: Adult Large)   Pulse 117   Temp 98.3  F (36.8  C) (Oral)   Resp 18   Ht 1.69 m (5' 6.54\")   Wt 98.6 kg (217 lb 6.4 oz)   LMP 12/02/2012   SpO2 98%   BMI 34.53 kg/m   Estimated body mass index is 34.53 kg/m  as calculated from the following:    Height as of this encounter: 1.69 m (5' 6.54\").    Weight as of this encounter: 98.6 kg (217 lb 6.4 oz). Body surface area is 2.15 meters squared.  No Pain (0) Comment: Data Unavailable   Patient's last menstrual period was 12/02/2012.  Allergies reviewed: Yes  Medications reviewed: Yes    Medications: Medication refills not needed today.  Pharmacy name entered into [facility]:    [facility] [place], [place] - [address]  [id number] - [place], [place] - [address]    Clinical concerns: None.        Emy Llamas CMA            "

## 2022-01-07 NOTE — LETTER
1/7/2022         RE: Nancy Bronson  [address] [place] [facility] [address]        Dear Colleague,    Thank you for referring your patient, Nancy Bronson, to the [facility]. Please see a copy of my visit note below.    [facility] Dermatology Note  Encounter Date: Jan 7, 2022  Office Visit     Dermatology Problem List:  PLC/Melanoma Clinic Patient  TBP completed 3/26/21  Skin checks q6 months through 7/2023  1. History of melanoma, left back, Stage IIB (cT3b, cN0, cM0)   -Initial biopsy on 6/2019: BD at least 3.5 with ulceration and 10 mitoses/mm2  - s/p WLE with keystone flap and negative left axillary sentinel lymph node biopsy (7/18/2019) with Gwyn Raman and Tate   2. History of dysplastic nevi  -Moderately atypical nevi x2 of the medial left upper back and mid upper back, s/p biopsy 9/10/19 with pigment recurrence 12/2019, s/p excision 3/4/20  -Nevus with congenital features and junctional atypia, right upper back, s/p biopsy 3/4/20, pigment recurrence noted 6/12/20, re-excised 7/23/20  3. DF + EIC, left lateral chest, s/p excision 4/6/21  4. 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 She has two children ([place] in high school, Jany in college).  Family history: Father had unknown types of skin cancer. Patient and her father are no longer in contact for her to ask. No family history of pancreatic cancer.   ____________________________________________    Assessment & Plan:     # Melanoma, stage IIB, left back. No evidence of recurrence. Discussed risk of melanoma recurrence (with local or distant disease) and risk of additional primary melanomas. Explained routine schedule for follow up examinations in office and need for  self skin checks monthly.   - ABCDs of melanoma were discussed and self skin checks were advised.   - Sun precaution was advised including the use of sunscreens of SPF 30 or higher, sun protective clothing, and avoidance of tanning beds.  - Recommended yearly dental, gyn, and ophthalmology examinations.  - Recommended first degree relatives get yearly skin exams as well.  - *Reviewed last surg onc note from 11/10/21 - plan for follow up exam in 6 months.  - Digital dermoscopy compared to when taken with TBP. No significant changes today.   - Follow-up in 6 months for repeat FBSE.      # History of DN: No evidence of recurrence. While these are benign, they are a marker for increased melanoma risk.  - Recommended yearly skin checks.     # Sun damaged skin with solar lentigines: Chronic, stable.  - Recommend sunscreens SPF #30 or greater, protective clothing and avoidance of tanning beds.     # Benign skin findings including: seborrheic keratoses, cherry angioma, skin tags, sebaceous hyperplasia - minor, self limited problem  - No further intervention required. Patient to report changes.   - Patient reassured of the benign nature of these lesions.     # Multiple clinically benign nevi: Chronic, stable  - No further intervention required. Patient to report changes.   - Patient reassured of the benign nature of these lesions.    # Monitor: Nevus on right lower back. Unchanged on dermoscopy today. Will follow with dermoscopy imaging on [facility].    # EIC, right inframammary crease  At this time, the lesion would be difficult to excise. Recommended she reach out vis MyChart if the lesion becomes inflamed at which point we would consider a course of doxycycline to calm inflammation and then excision.     Procedures Performed:   None    Follow-up: 6 months for next skin check, sooner for concerns.    Staff and Scribe:        Scribe Disclosure:  I, Taisha Prasad, am serving as a scribe to document services personally  performed by Melisa Richardson MD based on data collection and the provider's statements to me.     Provider Disclosure:   The documentation recorded by the scribe accurately reflects the services I personally performed and the decisions made by me.    Melisa Richardson MD    Department of Dermatology  [facility]: Phone: [phone], Fax:[phone]  [facility]: Phone: [phone], Fax: [phone]      ____________________________________________    CC: Oncology Clinic Visit (MELANOMA)    HPI:  Ms. Nancy Bronson is a(n) 51 year old female who presents today as a return patient for skin check.    Last visit 6/25/21 for skin check. At that time, a nevus at the right lower back was marked to monitor - it was slightly darker in color today compared to dermoscopy image on [facility] but this could be difference in dermatoscopes used. Plan was to recheck at next visit.     Today, Elpidio notes a cyst on the right breast area (demonstrated on US on 6/22/21). The lesion started very firm and tender. She applied a hot compress to the area which expelled odorous drainage. It is no longer draining. Otherwise, no concerns today. No new health changes or concerns.     Patient is otherwise feeling well, without additional skin concerns. Denies any fevers, chills, cough, shortness of breath, or weight loss.    Labs Reviewed:  N/A    Physical Exam:  Vitals: LMP 12/02/2012   SKIN: Total skin, which includes the head/face, both arms, chest, back, abdomen,both legs, buttocks, digits and/or nails, was examined.  - Archuleta Type II  - Well healed flap on the upper to mid back. No pigment recurrence or nodularity.   - Scattered brown macules on sun exposed areas, particularly the shoulders.  - Three linear scars with no pigment recurrence on the back (one on the right, two on the left).  - Skin tags in  axillae and at neck.  - Linear scar at left lateral chest from EIC removal.  - Scattered sebaceous hyperplasia on the face.  - There are dome shaped bright red papules on the face, scalp, and trunk.   - Multiple regular brown pigmented macules and papules. Some are larger than 6mm in size but these are uniform. Some have borders that feather off in the periphery or are patchy on dermoscopy. None have significant atypia on dermoscopic examination today. On the right lower back at wait band line, nevus is symmetric and unchanged on dermoscopy from last visit. Will continue monitor.  - There are waxy stuck on tan to brown papules on the trunk.  - There is a visible pore in the right inframammary crease with associated firmness.   - No other lesions of concern on areas examined.   LYMPH NODES: No submandibular, cervical, supraclavicular, axillary, or inguinal lymphadenopathy palpable on examination.          Medications:  Current Outpatient Medications   Medication     fluticasone (FLONASE) 50 MCG/ACT spray     hydrocortisone (WESTCORT) 0.2 % external cream     Current Facility-Administered Medications   Medication     lidocaine 1% with EPINEPHrine 1:100,000 injection 1.5 mL     lidocaine 1% with EPINEPHrine 1:100,000 injection 1.5 mL      Past Medical History:   Patient Active Problem List   Diagnosis     Snoring     S/P hysterectomy     Anemia     Malignant melanoma of torso excluding breast (H)     Past Medical History:   Diagnosis Date     Anemia      NO ACTIVE PROBLEMS        CC Referred Self, MD  No address on file on close of this encounter.      Melisa Richardson MD

## 2022-04-08 ENCOUNTER — TELEPHONE (OUTPATIENT)
Dept: DERMATOLOGY | Facility: CLINIC | Age: 52
End: 2022-04-08
Payer: COMMERCIAL

## 2022-05-09 NOTE — PROGRESS NOTES
FOLLOW UP  May 11, 2022     Nancy Bronson is a 52 year old woman who presents with with history of melanoma.      HPI:     She underwent a wide local excision of left back melanoma and left axillary sentinel lymph node biopsy 7/18/19 with Dr. Raman (Stage IIB).     Since her last visit, she has been doing well. She denies any headaches, dizziness, vision changes, abdominal pain, bowel habit changes, rectal bleeding, melena, chest pain, shortness of breath, or unintentional weight loss.  She has not noted any masses in either axilla bilaterally. She has good range of motion of her shoulders bilaterally and denies any upper extremity swelling. She reports a left axillary bump along with a history of previous similar bumps in her groin and under her breast. This axillary bump does intermittently drain pus when pressure is applied. She also reports a new skin bump under her right eye that bled once.      Her last skin check was performed by Dr Richardson on 1/7/22. She is scheduled to see Dr. Castañeda in September.     PAST MEDICAL HISTORY  Past Medical History:   Diagnosis Date     Anemia      NO ACTIVE PROBLEMS        PAST SURGICAL HISTORY   Past Surgical History:   Procedure Laterality Date     ABDOMEN SURGERY  2012    hysterectomy     BIOPSY NODE SENTINEL Left 7/18/2019    Procedure: [place] Lymph Node Mapping and Biopsy;  Surgeon: Susan Raman MD;  Location: [facility] OR     COLONOSCOPY N/A 6/28/2021    Procedure: COLONOSCOPY (fv) - would like prep emailed;  Surgeon: Cris Avelar MD;  Location:  GI     DAVINCI HYSTERECTOMY SUPRACERVICAL  12/4/2012    benign pathProcedure: DAVINCI HYSTERECTOMY SUPRACERVICAL;  Davinci Supracervical Hysterectomy with Bilateral Salpingectomy, Cystoscopy ;  Surgeon: Selene Cardenas DO;  Location:  OR     ENT SURGERY  1975     EXCISE LESION BACK N/A 7/18/2019    Procedure: Wide Local Excision of Back Melanoma;  Surgeon: Susan Raman MD;  Location:  OR     HEAD  & NECK SURGERY  1986    jaw surgery     LAPAROSCOPIC TUBAL LIGATION  2007     PROCEDURE PLACEHOLDER PLASTIC N/A 7/18/2019    Procedure: Back Reconstruction With Local Tissue Rearrangement;  Surgeon: DIPAK Ybarra MD;  Location: [facility] OR     ZC C-SEC ONLY,PREV C-SEC  2001,2004    2     [facility] NONSPECIFIC PROCEDURE  1975    tonsils     [facility] NONSPECIFIC PROCEDURE  1985    jaw surgery     MEDICATIONS  Current Outpatient Medications   Medication Sig Dispense Refill     fluticasone (FLONASE) 50 MCG/ACT spray Spray 1 spray into both nostrils 2 times daily as needed for rhinitis or allergies (Patient not taking: Reported on 5/11/2022) 16 g 1     hydrocortisone (WESTCORT) 0.2 % external cream Apply topically 2 times daily (Patient not taking: Reported on 5/11/2022) 15 g 2      ALLERGIES   No Known Allergies     SOCIAL HISTORY:  Smokes: No  EtOH: No  Wearing sunscreen and limiting sun exposure.     ROS:  Change in vision No  Headaches: no  Respiratory: No shortness of breath, dyspnea on exertion, cough, or hemoptysis   Cardiovascular: negative   Gastrointestinal: negative Abdominal pain: no  Musculoskeletal: negative Joint pain No Back pain: no  Psychiatric: negative  Hematologic/Lymphatic/Immunologic: negative  Endocrine: negative    EXAM  BP (!) 130/90   Pulse 85   Temp 98  F (36.7  C) (Oral)   Resp 18   Wt 97.9 kg (215 lb 12.8 oz)   LMP 12/02/2012   SpO2 98%   BMI 34.27 kg/m     PHYSICAL EXAM  Respiratory: breathing non labored.   Skin: back wide local excision site unremarkable. Centrally on the back there is an additional scar from dysplastic nevus resection. No nodularity, pigmentation, or satelitosis. Left axillary scar well healed. There is a red/purplish lesion in the left axillary at the lateral portion of the scar, possible hidradenitis.   3 mm flesh colored raise lesion inferior to her right eye with pin point black central dot. The black dot easily removed. No ulceration or bleeding. Possible sebaceous  hyperplasia.   No clavicular, cervical, axillary or inguinal lymphadenopathy.     ASSESSMENT/PLAN:    Nancy Bronson is a 52 year old with truncal melanoma, 3 years s/p surgery.     No concerning findings on exam. She will follow up on the right face lesion with Dr. Castañeda. The left axillary lesion appears to be consistent with hidradenitis. She will follow up sooner than her planned follow up if there are any worsening changes.      We reviewed that surveillance involves a history and physical exam every 6 months for the first 5 years, then annually, with imaging studies only indicated if symptoms arise.   We reviewed the signs and symptoms that could arise in the setting of recurrent locoregional or metastatic disease. In addition, she will need to undergo regular dermatologic full skin survey and evaluation given that patients with a diagnosis of melanoma are at risk of recurrence (local and distant) and of subsequent de sherrill melanoma.  I also reviewed the importance of protection from sun exposure, including wearing long sleeves, hats, etc and also the use of sunscreen with SPF of at least 35, with frequent re-applications.      PLAN:  1. Follow up with Dermatology as scheduled  2. Follow up in 6 months        Bekah Crawford PA-C    20 minutes spent on the date of the encounter doing chart review, review of test results, interpretation of tests, patient visit and documentation.

## 2022-05-11 ENCOUNTER — OFFICE VISIT (OUTPATIENT)
Dept: SURGERY | Facility: CLINIC | Age: 52
End: 2022-05-11
Attending: PHYSICIAN ASSISTANT
Payer: COMMERCIAL

## 2022-05-11 VITALS
BODY MASS INDEX: 34.27 KG/M2 | HEART RATE: 85 BPM | DIASTOLIC BLOOD PRESSURE: 90 MMHG | RESPIRATION RATE: 18 BRPM | SYSTOLIC BLOOD PRESSURE: 130 MMHG | TEMPERATURE: 98 F | OXYGEN SATURATION: 98 % | WEIGHT: 215.8 LBS

## 2022-05-11 DIAGNOSIS — Z85.820 HISTORY OF MELANOMA: Primary | ICD-10-CM

## 2022-05-11 PROCEDURE — 99213 OFFICE O/P EST LOW 20 MIN: CPT | Performed by: PHYSICIAN ASSISTANT

## 2022-05-11 PROCEDURE — G0463 HOSPITAL OUTPT CLINIC VISIT: HCPCS

## 2022-05-11 ASSESSMENT — PAIN SCALES - GENERAL: PAINLEVEL: NO PAIN (0)

## 2022-05-11 NOTE — LETTER
5/11/2022     RE: Nancy Bronson  [address] [facility] [address]    Dear Colleague,    Thank you for referring your patient, Nancy Bronson, to the [facility]. Please see a copy of my visit note below.    FOLLOW UP  May 11, 2022     Nancy Bronson is a 52 year old woman who presents with with history of melanoma.      HPI:     She underwent a wide local excision of left back melanoma and left axillary sentinel lymph node biopsy 7/18/19 with Dr. Raman (Stage IIB).     Since her last visit, she has been doing well. She denies any headaches, dizziness, vision changes, abdominal pain, bowel habit changes, rectal bleeding, melena, chest pain, shortness of breath, or unintentional weight loss.  She has not noted any masses in either axilla bilaterally. She has good range of motion of her shoulders bilaterally and denies any upper extremity swelling. She reports a left axillary bump along with a history of previous similar bumps in her groin and under her breast. This axillary bump does intermittently drain pus when pressure is applied. She also reports a new skin bump under her right eye that bled once.      Her last skin check was performed by Dr Richardson on 1/7/22. She is scheduled to see Dr. Castañeda in September.     PAST MEDICAL HISTORY  Past Medical History:   Diagnosis Date     Anemia      NO ACTIVE PROBLEMS        PAST SURGICAL HISTORY   Past Surgical History:   Procedure Laterality Date     ABDOMEN SURGERY  2012    hysterectomy     BIOPSY NODE SENTINEL Left 7/18/2019    Procedure: [place] Lymph Node Mapping and Biopsy;  Surgeon: Susan Raman MD;  [facility]     COLONOSCOPY N/A 6/28/2021    Procedure: COLONOSCOPY (fv) - would like prep emailed;  Surgeon: Cris Avelar MD;  Location:  GI     DAVINCI HYSTERECTOMY SUPRACERVICAL  12/4/2012    benign pathProcedure: DAVINCI HYSTERECTOMY SUPRACERVICAL;  Davinci Supracervical Hysterectomy with Bilateral  Salpingectomy, Cystoscopy ;  Surgeon: Selene Cardenas DO;  Location: [facility] OR     ENT SURGERY  1975     EXCISE LESION BACK N/A 7/18/2019    Procedure: Wide Local Excision of Back Melanoma;  Surgeon: Susan Raman MD;  Location:  OR     HEAD & NECK SURGERY  1986    jaw surgery     LAPAROSCOPIC TUBAL LIGATION  2007     PROCEDURE PLACEHOLDER PLASTIC N/A 7/18/2019    Procedure: Back Reconstruction With Local Tissue Rearrangement;  Surgeon: DIPAK Ybarra MD;  Location:  OR     ZZC C-SEC ONLY,PREV C-SEC  2001,2004    2     Z NONSPECIFIC PROCEDURE  1975    tonsils     ZZC NONSPECIFIC PROCEDURE  1985    jaw surgery     MEDICATIONS  Current Outpatient Medications   Medication Sig Dispense Refill     fluticasone (FLONASE) 50 MCG/ACT spray Spray 1 spray into both nostrils 2 times daily as needed for rhinitis or allergies (Patient not taking: Reported on 5/11/2022) 16 g 1     hydrocortisone (WESTCORT) 0.2 % external cream Apply topically 2 times daily (Patient not taking: Reported on 5/11/2022) 15 g 2      ALLERGIES   No Known Allergies     SOCIAL HISTORY:  Smokes: No  EtOH: No  Wearing sunscreen and limiting sun exposure.     ROS:  Change in vision No  Headaches: no  Respiratory: No shortness of breath, dyspnea on exertion, cough, or hemoptysis   Cardiovascular: negative   Gastrointestinal: negative Abdominal pain: no  Musculoskeletal: negative Joint pain No Back pain: no  Psychiatric: negative  Hematologic/Lymphatic/Immunologic: negative  Endocrine: negative    EXAM  BP (!) 130/90   Pulse 85   Temp 98  F (36.7  C) (Oral)   Resp 18   Wt 97.9 kg (215 lb 12.8 oz)   LMP 12/02/2012   SpO2 98%   BMI 34.27 kg/m     PHYSICAL EXAM  Respiratory: breathing non labored.   Skin: back wide local excision site unremarkable. Centrally on the back there is an additional scar from dysplastic nevus resection. No nodularity, pigmentation, or satelitosis. Left axillary scar well healed. There is a red/purplish  lesion in the left axillary at the lateral portion of the scar, possible hidradenitis.   3 mm flesh colored raise lesion inferior to her right eye with pin point black central dot. The black dot easily removed. No ulceration or bleeding. Possible sebaceous hyperplasia.   No clavicular, cervical, axillary or inguinal lymphadenopathy.     ASSESSMENT/PLAN:    Nancy Bronson is a 52 year old with truncal melanoma, 3 years s/p surgery.     No concerning findings on exam. She will follow up on the right face lesion with Dr. Castañeda. The left axillary lesion appears to be consistent with hidradenitis. She will follow up sooner than her planned follow up if there are any worsening changes.      We reviewed that surveillance involves a history and physical exam every 6 months for the first 5 years, then annually, with imaging studies only indicated if symptoms arise.   We reviewed the signs and symptoms that could arise in the setting of recurrent locoregional or metastatic disease. In addition, she will need to undergo regular dermatologic full skin survey and evaluation given that patients with a diagnosis of melanoma are at risk of recurrence (local and distant) and of subsequent de sherrill melanoma.  I also reviewed the importance of protection from sun exposure, including wearing long sleeves, hats, etc and also the use of sunscreen with SPF of at least 35, with frequent re-applications.      PLAN:  1. Follow up with Dermatology as scheduled  2. Follow up in 6 months        Bekah Crawford PA-C    20 minutes spent on the date of the encounter doing chart review, review of test results, interpretation of tests, patient visit and documentation.

## 2022-05-11 NOTE — NURSING NOTE
"Oncology Rooming Note    May 11, 2022 10:50 AM   Nancy Bronson is a 52 year old female who presents for:    Chief Complaint   Patient presents with     Oncology Clinic Visit     Malignant melanoma of torso excluding breast     Initial Vitals: BP (!) 130/90   Pulse 85   Temp 98  F (36.7  C) (Oral)   Resp 18   Wt 97.9 kg (215 lb 12.8 oz)   LMP 12/02/2012   SpO2 98%   BMI 34.27 kg/m   Estimated body mass index is 34.27 kg/m  as calculated from the following:    Height as of 1/7/22: 1.69 m (5' 6.54\").    Weight as of this encounter: 97.9 kg (215 lb 12.8 oz). Body surface area is 2.14 meters squared.  No Pain (0) Comment: Data Unavailable   Patient's last menstrual period was 12/02/2012.  Allergies reviewed: Yes  Medications reviewed: Yes    Medications: Medication refills not needed today.  Pharmacy name entered into [facility]:    [facility] [place], [place] - [address]  [id number] - [place], [place] - [address]  ABDIRAHMAN MENESES    Clinical concerns: Has spot on right side of face under her eye that she would like assessed along with having her left armpit looked at.        Sue Pyle LPN            "

## 2022-08-04 ENCOUNTER — MYC MEDICAL ADVICE (OUTPATIENT)
Dept: FAMILY MEDICINE | Facility: CLINIC | Age: 52
End: 2022-08-04

## 2022-08-05 NOTE — TELEPHONE ENCOUNTER
PHQ-2 Score:     PHQ-2 ( 1999 [employer]) 8/4/2022 6/16/2021   Q1: Little interest or pleasure in doing things 0 0   Q2: Feeling down, depressed or hopeless 0 0   PHQ-2 Score 0 0   PHQ-2 Total Score (12-17 Years)- Positive if 3 or more points; Administer PHQ-A if positive - 0   Q1: Little interest or pleasure in doing things Not at all Not at all   Q2: Feeling down, depressed or hopeless Not at all Not at all   PHQ-2 Score 0 0       Summary:    Patient is due/failing the following:   phq-2, depression screening    Reviewed:    [] CARE EVERYWHERE  [] LAST OV NOTE   [] FYI TAB  [] MYCHART ACTIVE?  [] LAST PANEL ENCOUNTER  [] FUTURE APPTS  [] IMMUNIZATIONS  [] Media Tab            Action needed:   Depression screening    Type of outreach:    Sent [employer] message.                                                                               Anita Morley/JOHN  [place]

## 2022-08-20 ENCOUNTER — HEALTH MAINTENANCE LETTER (OUTPATIENT)
Age: 52
End: 2022-08-20

## 2022-09-08 ENCOUNTER — OFFICE VISIT (OUTPATIENT)
Dept: DERMATOLOGY | Facility: CLINIC | Age: 52
End: 2022-09-08
Payer: COMMERCIAL

## 2022-09-08 DIAGNOSIS — D18.01 ANGIOMA OF SKIN: ICD-10-CM

## 2022-09-08 DIAGNOSIS — Z85.820 HISTORY OF MELANOMA: Primary | ICD-10-CM

## 2022-09-08 DIAGNOSIS — L81.4 LENTIGO: ICD-10-CM

## 2022-09-08 DIAGNOSIS — D23.9 DERMAL NEVUS: ICD-10-CM

## 2022-09-08 DIAGNOSIS — D22.9 NEVUS: ICD-10-CM

## 2022-09-08 DIAGNOSIS — L82.1 SEBORRHEIC KERATOSIS: ICD-10-CM

## 2022-09-08 PROCEDURE — 99203 OFFICE O/P NEW LOW 30 MIN: CPT | Performed by: DERMATOLOGY

## 2022-09-08 NOTE — PROGRESS NOTES
Nancy Bronson is an extremely pleasant 52 year old year old female patient here today for hx of melanoma stage IIB .  Initial biopsy on 6/2019: BD at least 3.5 with ulceration and 10 mitoses/mm2 s/p WLE and negative left axillary sentinel lymph node biopsy (7/18/2019). Also has hx of atypical nevi.  She denies any new or changing skin lesions.  Patient has no other skin complaints today.  Remainder of the HPI, Meds, PMH, Allergies, FH, and SH was reviewed in chart.      Past Medical History:   Diagnosis Date     Anemia      Malignant melanoma (H)      NO ACTIVE PROBLEMS        Past Surgical History:   Procedure Laterality Date     ABDOMEN SURGERY  2012    hysterectomy     BIOPSY NODE SENTINEL Left 7/18/2019    Procedure: [place] Lymph Node Mapping and Biopsy;  Surgeon: Susan Raman MD;  Location:  OR     COLONOSCOPY N/A 6/28/2021    Procedure: COLONOSCOPY (fv) - would like prep emailed;  Surgeon: Cris Avelar MD;  Location:  GI     DAVINCI HYSTERECTOMY SUPRACERVICAL  12/4/2012    benign pathProcedure: DAVINCI HYSTERECTOMY SUPRACERVICAL;  Davinci Supracervical Hysterectomy with Bilateral Salpingectomy, Cystoscopy ;  Surgeon: Selene Cardenas DO;  Location:  OR     ENT SURGERY  1975     EXCISE LESION BACK N/A 7/18/2019    Procedure: Wide Local Excision of Back Melanoma;  Surgeon: Susan Raman MD;  Location:  OR     HEAD & NECK SURGERY  1986    jaw surgery     LAPAROSCOPIC TUBAL LIGATION  2007     PROCEDURE PLACEHOLDER PLASTIC N/A 7/18/2019    Procedure: Back Reconstruction With Local Tissue Rearrangement;  Surgeon: DIPAK Ybarra MD;  Location:  OR     ZZC C-SEC ONLY,PREV C-SEC  2001,2004    2     ZZC NONSPECIFIC PROCEDURE  1975    tonsils     ZZC NONSPECIFIC PROCEDURE  1985    jaw surgery        Family History   Problem Relation Age of Onset     Respiratory Father         sleep apnea, hypertension     Family History Negative Mother      Family History Negative Brother          1     Cancer Maternal Grandmother         Hodgkins      Cancer Maternal Grandfather          lung cancer-smoker     Cancer Paternal Grandfather         leukemia     Family History Negative Daughter      Cancer - colorectal Maternal Aunt         -at age of diagnosis -early 40's     Cancer Other         Hodgkins and  faternal twin brother as well      Colon Cancer Maternal Half-Sister      Breast Cancer No family hx of        Social History     Socioeconomic History     Marital status:      Spouse name: Juan José     Number of children: 1     Years of education: 13     Highest education level: Not on file   Occupational History     Occupation:      Comment: [employer].   Tobacco Use     Smoking status: Former Smoker     Smokeless tobacco: Never Used     Tobacco comment: quit in    Substance and Sexual Activity     Alcohol use: Yes     Comment: social     Drug use: No     Sexual activity: Not Currently     Partners: Male     Birth control/protection: Surgical   Other Topics Concern     Parent/sibling w/ CABG, MI or angioplasty before 65F 55M? No   Social History Narrative     Not on file     Social Determinants of Health     Financial Resource Strain: Not on file   Food Insecurity: Not on file   Transportation Needs: Not on file   Physical Activity: Not on file   Stress: Not on file   Social Connections: Not on file   Intimate Partner Violence: Not on file   Housing Stability: Not on file       Outpatient Encounter Medications as of 2022   Medication Sig Dispense Refill     fluticasone (FLONASE) 50 MCG/ACT spray Spray 1 spray into both nostrils 2 times daily as needed for rhinitis or allergies (Patient not taking: No sig reported) 16 g 1     hydrocortisone (WESTCORT) 0.2 % external cream Apply topically 2 times daily (Patient not taking: No sig reported) 15 g 2     No facility-administered encounter medications on file as of 2022.             O:   NAD, MARICRUZ,  Alert & Oriented, Mood & Affect wnl, Vitals stable   Here today alone   General appearance normal   Vitals stable   Alert, oriented and in no acute distress      Following lymph nodes palpated: Occipital, Cervical, Supraclavicular , axilla , inguinal no lad  pigmented macules on trunk and ext with regular borders and pigment networks   Largest lesion on left ab uniformly pigmented 7mm macule      Stuck on papules and brown macules on trunk and ext   Red papules on trunk  Flesh colored papules on trunk     The remainder of the full exam was normal; the following areas were examined:  conjunctiva/lids, oral mucosa, neck, peripheral vascular system, abdomen, lymph nodes, digits/nails, eccrine and apocrine glands, scalp/hair, face, neck, chest, abdomen, buttocks, back, RUE, LUE, RLE, LLE       Eyes: Conjunctivae/lids:Normal     ENT: Lips, buccal mucosa, tongue: normal    MSK:Normal    Cardiovascular: peripheral edema none    Pulm: Breathing Normal    Lymph Nodes: No Head and Neck Lymphadenopathy     Neuro/Psych: Orientation:Alert and Orientedx3 ; Mood/Affect:normal       A/P:  1. Seborrheic keratosis, lentigo, angioma, dermal nevus, hx of melanoma, nevi   MELANOMA DISCUSSED WITH PATIENT:  I discussed the specifics of tumor, prognosis, metachronous melanoma, self exam, and genetics with the patient. I explained the need for monthly skin exams including and taught the patient how to do this. Patient was asked about new or changing moles . I discussed with patient signs and symptoms that could arise in the setting of recurrent locoregional or metastatic disease. In addition, the need to undergo every 4 month dermatologic full skin survey and evaluation given that patients with a diagnosis of melanoma are at risk of recurrence (local and distant) and of subsequent de sherrill melanoma.    It was a pleasure speaking to Nancy Bronson today.  Previous clinic notes and pertinent laboratory tests were reviewed prior to Nancy QUESADA  Aiyana's visit.  Signs and Symptoms of skin cancer discussed with patient.  Patient encouraged to perform monthly skin exams.  UV precautions reviewed with patient.  Return to clinic 6 months

## 2022-09-08 NOTE — LETTER
9/8/2022         RE: Nancy Bronson  [address] [facility] [address]        Dear Colleague,    Thank you for referring your patient, Nancy Bronson, to the [facility]. Please see a copy of my visit note below.    Nancy Bronson is an extremely pleasant 52 year old year old female patient here today for hx of melanoma stage IIB .  Initial biopsy on 6/2019: BD at least 3.5 with ulceration and 10 mitoses/mm2 s/p WLE and negative left axillary sentinel lymph node biopsy (7/18/2019). Also has hx of atypical nevi.  She denies any new or changing skin lesions.  Patient has no other skin complaints today.  Remainder of the HPI, Meds, PMH, Allergies, FH, and SH was reviewed in chart.      Past Medical History:   Diagnosis Date     Anemia      Malignant melanoma (H)      NO ACTIVE PROBLEMS        Past Surgical History:   Procedure Laterality Date     ABDOMEN SURGERY  2012    hysterectomy     BIOPSY NODE SENTINEL Left 7/18/2019    Procedure: [place] Lymph Node Mapping and Biopsy;  Surgeon: Susan Raman MD;  Location:  OR     COLONOSCOPY N/A 6/28/2021    Procedure: COLONOSCOPY (fv) - would like prep emailed;  Surgeon: Cris Avelar MD;  Location:  GI     DAVINCI HYSTERECTOMY SUPRACERVICAL  12/4/2012    benign pathProcedure: DAVINCI HYSTERECTOMY SUPRACERVICAL;  Davinci Supracervical Hysterectomy with Bilateral Salpingectomy, Cystoscopy ;  Surgeon: Selene Cardenas DO;  Location:  OR     ENT SURGERY  1975     EXCISE LESION BACK N/A 7/18/2019    Procedure: Wide Local Excision of Back Melanoma;  Surgeon: Susan Raman MD;  Location:  OR     HEAD & NECK SURGERY  1986    jaw surgery     LAPAROSCOPIC TUBAL LIGATION  2007     PROCEDURE PLACEHOLDER PLASTIC N/A 7/18/2019    Procedure: Back Reconstruction With Local Tissue Rearrangement;  Surgeon: DIPAK Ybarra MD;  Location:  OR     [facility] C-SEC ONLY,PREV C-SEC  2001,2004    2     [facility] NONSPECIFIC  PROCEDURE      tonsils     ZZC NONSPECIFIC PROCEDURE      jaw surgery        Family History   Problem Relation Age of Onset     Respiratory Father         sleep apnea, hypertension     Family History Negative Mother      Family History Negative Brother         1     Cancer Maternal Grandmother         Hodgkins      Cancer Maternal Grandfather          lung cancer-smoker     Cancer Paternal Grandfather         leukemia     Family History Negative Daughter      Cancer - colorectal Maternal Aunt         -at age of diagnosis -early 40's     Cancer Other         Hodgkins and  faternal twin brother as well      Colon Cancer Maternal Half-Sister      Breast Cancer No family hx of        Social History     Socioeconomic History     Marital status:      Spouse name: Juan José     Number of children: 1     Years of education: 13     Highest education level: Not on file   Occupational History     Occupation:      Comment: [employer] [place].   Tobacco Use     Smoking status: Former Smoker     Smokeless tobacco: Never Used     Tobacco comment: quit in    Substance and Sexual Activity     Alcohol use: Yes     Comment: social     Drug use: No     Sexual activity: Not Currently     Partners: Male     Birth control/protection: Surgical   Other Topics Concern     Parent/sibling w/ CABG, MI or angioplasty before 65F 55M? No   Social History Narrative     Not on file     Social Determinants of Health     Financial Resource Strain: Not on file   Food Insecurity: Not on file   Transportation Needs: Not on file   Physical Activity: Not on file   Stress: Not on file   Social Connections: Not on file   Intimate Partner Violence: Not on file   Housing Stability: Not on file       Outpatient Encounter Medications as of 2022   Medication Sig Dispense Refill     fluticasone (FLONASE) 50 MCG/ACT spray Spray 1 spray into both nostrils 2 times daily as needed for rhinitis or allergies (Patient not  taking: No sig reported) 16 g 1     hydrocortisone (WESTCORT) 0.2 % external cream Apply topically 2 times daily (Patient not taking: No sig reported) 15 g 2     No facility-administered encounter medications on file as of 9/8/2022.             O:   NAD, WDWN, Alert & Oriented, Mood & Affect wnl, Vitals stable   Here today alone   General appearance normal   Vitals stable   Alert, oriented and in no acute distress      Following lymph nodes palpated: Occipital, Cervical, Supraclavicular , axilla , inguinal no lad  pigmented macules on trunk and ext with regular borders and pigment networks   Largest lesion on left ab uniformly pigmented 7mm macule      Stuck on papules and brown macules on trunk and ext   Red papules on trunk  Flesh colored papules on trunk     The remainder of the full exam was normal; the following areas were examined:  conjunctiva/lids, oral mucosa, neck, peripheral vascular system, abdomen, lymph nodes, digits/nails, eccrine and apocrine glands, scalp/hair, face, neck, chest, abdomen, buttocks, back, RUE, LUE, RLE, LLE       Eyes: Conjunctivae/lids:Normal     ENT: Lips, buccal mucosa, tongue: normal    MSK:Normal    Cardiovascular: peripheral edema none    Pulm: Breathing Normal    Lymph Nodes: No Head and Neck Lymphadenopathy     Neuro/Psych: Orientation:Alert and Orientedx3 ; Mood/Affect:normal       A/P:  1. Seborrheic keratosis, lentigo, angioma, dermal nevus, hx of melanoma, nevi   MELANOMA DISCUSSED WITH PATIENT:  I discussed the specifics of tumor, prognosis, metachronous melanoma, self exam, and genetics with the patient. I explained the need for monthly skin exams including and taught the patient how to do this. Patient was asked about new or changing moles . I discussed with patient signs and symptoms that could arise in the setting of recurrent locoregional or metastatic disease. In addition, the need to undergo every 4 month dermatologic full skin survey and evaluation given that  patients with a diagnosis of melanoma are at risk of recurrence (local and distant) and of subsequent de sherrill melanoma.    It was a pleasure speaking to Nancy Bronson today.  Previous clinic notes and pertinent laboratory tests were reviewed prior to Nancy Bronson's visit.  Signs and Symptoms of skin cancer discussed with patient.  Patient encouraged to perform monthly skin exams.  UV precautions reviewed with patient.  Return to clinic 6 months        Again, thank you for allowing me to participate in the care of your patient.        Sincerely,        Leonel Castañeda MD

## 2022-11-14 NOTE — PROGRESS NOTES
.FOLLOW UP  Nov 16, 2022     Nancy Bronson is a 52 year old woman who presents with with history of melanoma.      HPI:     She underwent a wide local excision of left back melanoma and left axillary sentinel lymph node biopsy 7/18/19 with Dr. Raman (Stage IIB T3bN0).     Since her last visit, she has been doing well. She denies any headaches, dizziness, vision changes, abdominal pain, bowel habit changes, rectal bleeding, melena, chest pain, shortness of breath, or unintentional weight loss.  She has not noted any masses in either axilla. She has good range of motion of her shoulders and denies any upper extremity swelling. She denies any new skin lesions or nodules.      Her last skin check was performed by Dr. Castañeda 9/8/22.     PAST MEDICAL HISTORY  Past Medical History:   Diagnosis Date     Anemia      Malignant melanoma (H)      NO ACTIVE PROBLEMS        PAST SURGICAL HISTORY   Past Surgical History:   Procedure Laterality Date     ABDOMEN SURGERY  2012    hysterectomy     BIOPSY NODE SENTINEL Left 7/18/2019    Procedure: [place] Lymph Node Mapping and Biopsy;  Surgeon: Susan Raman MD;  Location:  OR     COLONOSCOPY N/A 6/28/2021    Procedure: COLONOSCOPY (fv) - would like prep emailed;  Surgeon: Cris Avelar MD;  Location:  GI     DAVINCI HYSTERECTOMY SUPRACERVICAL  12/4/2012    benign pathProcedure: DAVINCI HYSTERECTOMY SUPRACERVICAL;  Davinci Supracervical Hysterectomy with Bilateral Salpingectomy, Cystoscopy ;  Surgeon: Selene Cardenas DO;  Location:  OR     ENT SURGERY  1975     EXCISE LESION BACK N/A 7/18/2019    Procedure: Wide Local Excision of Back Melanoma;  Surgeon: Susan Raman MD;  Location:  OR     HEAD & NECK SURGERY  1986    jaw surgery     LAPAROSCOPIC TUBAL LIGATION  2007     PROCEDURE PLACEHOLDER PLASTIC N/A 7/18/2019    Procedure: Back Reconstruction With Local Tissue Rearrangement;  Surgeon: DIPAK Ybarra MD;  Location:  OR     [facility]-SEC  ONLY,PREV C-SEC  2001,2004    2     [facility] NONSPECIFIC PROCEDURE  1975    tonsils     [facility] NONSPECIFIC PROCEDURE  1985    jaw surgery     MEDICATIONS  Current Outpatient Medications   Medication Sig Dispense Refill     fluticasone (FLONASE) 50 MCG/ACT spray Spray 1 spray into both nostrils 2 times daily as needed for rhinitis or allergies (Patient not taking: No sig reported) 16 g 1     hydrocortisone (WESTCORT) 0.2 % external cream Apply topically 2 times daily (Patient not taking: No sig reported) 15 g 2      ALLERGIES   No Known Allergies     SOCIAL HISTORY:  Smokes: No  EtOH: No  Wearing sunscreen and limiting sun exposure.     ROS:  Change in vision No  Headaches: no  Respiratory: No shortness of breath, dyspnea on exertion, cough, or hemoptysis   Cardiovascular: negative   Gastrointestinal: negative Abdominal pain: no  Musculoskeletal: negative Joint pain No Back pain: no  Psychiatric: negative  Hematologic/Lymphatic/Immunologic: negative  Endocrine: negative    EXAM  LMP 12/02/2012    PHYSICAL EXAM  Respiratory: breathing non labored.   Skin: back wide local excision site unremarkable. Centrally on the back there is an additional scar from dysplastic nevus resection. No nodularity, pigmentation, or satelitosis. Left axillary scar well healed.   No clavicular, cervical, axillary or inguinal lymphadenopathy.     ASSESSMENT/PLAN:    Nancy Bronson is a 52 year old with truncal melanoma, 3 years s/p surgery.     No concerning findings on exam. We reviewed that surveillance involves a history and physical exam every 6 months for the first 5 years, then annually, with imaging studies only indicated if symptoms arise.   We reviewed the signs and symptoms that could arise in the setting of recurrent locoregional or metastatic disease. In addition, she will need to undergo regular dermatologic full skin survey and evaluation given that patients with a diagnosis of melanoma are at risk of recurrence (local and distant)  and of subsequent de sherrill melanoma.  I also reviewed the importance of protection from sun exposure, including wearing long sleeves, hats, etc and also the use of sunscreen with SPF of at least 35, with frequent re-applications.      PLAN:  1. Follow up with Dermatology  2. Follow up in 6 months        Bekah Crawford PA-C    20 minutes spent on the date of the encounter doing chart review, review of test results, interpretation of tests, patient visit and documentation.

## 2022-11-16 ENCOUNTER — OFFICE VISIT (OUTPATIENT)
Dept: SURGERY | Facility: CLINIC | Age: 52
End: 2022-11-16
Attending: PHYSICIAN ASSISTANT
Payer: COMMERCIAL

## 2022-11-16 VITALS
RESPIRATION RATE: 16 BRPM | SYSTOLIC BLOOD PRESSURE: 117 MMHG | TEMPERATURE: 98.2 F | BODY MASS INDEX: 34.48 KG/M2 | OXYGEN SATURATION: 96 % | HEART RATE: 97 BPM | HEIGHT: 67 IN | DIASTOLIC BLOOD PRESSURE: 80 MMHG | WEIGHT: 219.7 LBS

## 2022-11-16 DIAGNOSIS — C43.59 MALIGNANT MELANOMA OF TORSO EXCLUDING BREAST (H): ICD-10-CM

## 2022-11-16 PROCEDURE — 99214 OFFICE O/P EST MOD 30 MIN: CPT | Performed by: PHYSICIAN ASSISTANT

## 2022-11-16 PROCEDURE — G0463 HOSPITAL OUTPT CLINIC VISIT: HCPCS

## 2022-11-16 ASSESSMENT — PAIN SCALES - GENERAL: PAINLEVEL: NO PAIN (0)

## 2022-11-16 NOTE — LETTER
11/16/2022         RE: Nancy Bronson  [address] [facility] [address]        Dear Colleague,    Thank you for referring your patient, Nancy Bronson, to the [facility]. Please see a copy of my visit note below.    .FOLLOW UP  Nov 16, 2022     Nancy Bronson is a 52 year old woman who presents with with history of melanoma.      HPI:     She underwent a wide local excision of left back melanoma and left axillary sentinel lymph node biopsy 7/18/19 with Dr. Raman (Stage IIB T3bN0).     Since her last visit, she has been doing well. She denies any headaches, dizziness, vision changes, abdominal pain, bowel habit changes, rectal bleeding, melena, chest pain, shortness of breath, or unintentional weight loss.  She has not noted any masses in either axilla. She has good range of motion of her shoulders and denies any upper extremity swelling. She denies any new skin lesions or nodules.      Her last skin check was performed by Dr. Castañeda 9/8/22.     PAST MEDICAL HISTORY  Past Medical History:   Diagnosis Date     Anemia      Malignant melanoma (H)      NO ACTIVE PROBLEMS        PAST SURGICAL HISTORY   Past Surgical History:   Procedure Laterality Date     ABDOMEN SURGERY  2012    hysterectomy     BIOPSY NODE SENTINEL Left 7/18/2019    Procedure: [place] Lymph Node Mapping and Biopsy;  Surgeon: Susan Raman MD;  [facility]     COLONOSCOPY N/A 6/28/2021    Procedure: COLONOSCOPY (fv) - would like prep emailed;  Surgeon: Cris Avelar MD;  Location:  GI     DAVINCI HYSTERECTOMY SUPRACERVICAL  12/4/2012    benign pathProcedure: DAVINCI HYSTERECTOMY SUPRACERVICAL;  Davinci Supracervical Hysterectomy with Bilateral Salpingectomy, Cystoscopy ;  Surgeon: Selene Cardenas DO;  Location:  OR     ENT SURGERY  1975     EXCISE LESION BACK N/A 7/18/2019    Procedure: Wide Local Excision of Back Melanoma;  Surgeon: Susan Raman MD;  Location:  OR     HEAD &  NECK SURGERY  1986    jaw surgery     LAPAROSCOPIC TUBAL LIGATION  2007     PROCEDURE PLACEHOLDER PLASTIC N/A 7/18/2019    Procedure: Back Reconstruction With Local Tissue Rearrangement;  Surgeon: DIPAK Ybarra MD;  Location:  OR     [facility] C-SEC ONLY,PREV C-SEC  2001,2004    2     [facility] NONSPECIFIC PROCEDURE  1975    tonsils     [facility] NONSPECIFIC PROCEDURE  1985    jaw surgery     MEDICATIONS  Current Outpatient Medications   Medication Sig Dispense Refill     fluticasone (FLONASE) 50 MCG/ACT spray Spray 1 spray into both nostrils 2 times daily as needed for rhinitis or allergies (Patient not taking: No sig reported) 16 g 1     hydrocortisone (WESTCORT) 0.2 % external cream Apply topically 2 times daily (Patient not taking: No sig reported) 15 g 2      ALLERGIES   No Known Allergies     SOCIAL HISTORY:  Smokes: No  EtOH: No  Wearing sunscreen and limiting sun exposure.     ROS:  Change in vision No  Headaches: no  Respiratory: No shortness of breath, dyspnea on exertion, cough, or hemoptysis   Cardiovascular: negative   Gastrointestinal: negative Abdominal pain: no  Musculoskeletal: negative Joint pain No Back pain: no  Psychiatric: negative  Hematologic/Lymphatic/Immunologic: negative  Endocrine: negative    EXAM  [facility] 12/02/2012    PHYSICAL EXAM  Respiratory: breathing non labored.   Skin: back wide local excision site unremarkable. Centrally on the back there is an additional scar from dysplastic nevus resection. No nodularity, pigmentation, or satelitosis. Left axillary scar well healed.   No clavicular, cervical, axillary or inguinal lymphadenopathy.     ASSESSMENT/PLAN:    Nancy Bronson is a 52 year old with truncal melanoma, 3 years s/p surgery.     No concerning findings on exam. We reviewed that surveillance involves a history and physical exam every 6 months for the first 5 years, then annually, with imaging studies only indicated if symptoms arise.   We reviewed the signs and symptoms that could  arise in the setting of recurrent locoregional or metastatic disease. In addition, she will need to undergo regular dermatologic full skin survey and evaluation given that patients with a diagnosis of melanoma are at risk of recurrence (local and distant) and of subsequent de sherrill melanoma.  I also reviewed the importance of protection from sun exposure, including wearing long sleeves, hats, etc and also the use of sunscreen with SPF of at least 35, with frequent re-applications.      PLAN:  1. Follow up with Dermatology  2. Follow up in 6 months    20 minutes spent on the date of the encounter doing chart review, review of test results, interpretation of tests, patient visit and documentation.        Again, thank you for allowing me to participate in the care of your patient.      Sincerely,    Bekah Crawford PA-C

## 2022-11-16 NOTE — NURSING NOTE
"Oncology Rooming Note    November 16, 2022 10:59 AM   Nancy Bronson is a 52 year old female who presents for:    Chief Complaint   Patient presents with     Oncology Clinic Visit     [facility] RETURN - MELANOMA OF TORSO     Initial Vitals: /80 (BP Location: Right arm, Patient Position: Chair, Cuff Size: Adult Large)   Pulse 97   Temp 98.2  F (36.8  C) (Oral)   Resp 16   Ht 1.69 m (5' 6.54\")   Wt 99.7 kg (219 lb 11.2 oz)   LMP 12/02/2012   SpO2 96%   BMI 34.89 kg/m   Estimated body mass index is 34.89 kg/m  as calculated from the following:    Height as of this encounter: 1.69 m (5' 6.54\").    Weight as of this encounter: 99.7 kg (219 lb 11.2 oz). Body surface area is 2.16 meters squared.  No Pain (0) Comment: Data Unavailable   Patient's last menstrual period was 12/02/2012.  Allergies reviewed: Yes  Medications reviewed: Yes    Medications: Medication refills not needed today.  Pharmacy name entered into [facility]:    [facility] [place], [place] - [address]  [id number] - [place], [place] - [facility] [address] [facility] [address]    Lino Levine LPN            "

## 2022-11-20 ENCOUNTER — HEALTH MAINTENANCE LETTER (OUTPATIENT)
Age: 52
End: 2022-11-20

## 2023-03-09 ENCOUNTER — OFFICE VISIT (OUTPATIENT)
Dept: FAMILY MEDICINE | Facility: CLINIC | Age: 53
End: 2023-03-09
Payer: COMMERCIAL

## 2023-03-09 DIAGNOSIS — L81.4 LENTIGINES: ICD-10-CM

## 2023-03-09 DIAGNOSIS — D18.01 CHERRY ANGIOMA: ICD-10-CM

## 2023-03-09 DIAGNOSIS — L82.1 SEBORRHEIC KERATOSES: ICD-10-CM

## 2023-03-09 DIAGNOSIS — C43.59 MALIGNANT MELANOMA OF TORSO EXCLUDING BREAST (H): Primary | ICD-10-CM

## 2023-03-09 DIAGNOSIS — Z87.898 HISTORY OF ATYPICAL NEVUS: ICD-10-CM

## 2023-03-09 DIAGNOSIS — D49.2 SKIN NEOPLASM: ICD-10-CM

## 2023-03-09 DIAGNOSIS — D22.9 MULTIPLE BENIGN NEVI: ICD-10-CM

## 2023-03-09 DIAGNOSIS — L91.8 SKIN TAG: ICD-10-CM

## 2023-03-09 DIAGNOSIS — L73.8 SEBACEOUS HYPERPLASIA: ICD-10-CM

## 2023-03-09 PROCEDURE — 88342 IMHCHEM/IMCYTCHM 1ST ANTB: CPT | Performed by: PATHOLOGY

## 2023-03-09 PROCEDURE — 99213 OFFICE O/P EST LOW 20 MIN: CPT | Mod: 25 | Performed by: PHYSICIAN ASSISTANT

## 2023-03-09 PROCEDURE — 88305 TISSUE EXAM BY PATHOLOGIST: CPT | Performed by: PATHOLOGY

## 2023-03-09 PROCEDURE — 11102 TANGNTL BX SKIN SINGLE LES: CPT | Performed by: PHYSICIAN ASSISTANT

## 2023-03-09 PROCEDURE — 88341 IMHCHEM/IMCYTCHM EA ADD ANTB: CPT | Performed by: PATHOLOGY

## 2023-03-09 NOTE — PROGRESS NOTES
[facility] Dermatology Note  Encounter Date: Mar 9, 2023  Office Visit      Dermatology Problem List:  Total Body Photography completed 3/26/21  Skin checks q6 months through 7/2023  0. NUB - R shin - s/p bx 3/9/23  1. History of melanoma, left back, Stage IIB (cT3b, cN0, cM0)   -Initial biopsy on 6/2019: BD at least 3.5 with ulceration and 10 mitoses/mm2  - s/p WLE with keystone flap and negative left axillary sentinel lymph node biopsy (7/18/2019) with Gwyn Raman and Tate   2. History of dysplastic nevi  -Moderately atypical nevi x2 of the medial left upper back and mid upper back, s/p biopsy 9/10/19 with pigment recurrence 12/2019, s/p excision 3/4/20  -Nevus with congenital features and junctional atypia, right upper back, s/p biopsy 3/4/20, pigment recurrence noted 6/12/20, re-excised 7/23/20  3. DF + EIC, left lateral chest, s/p excision 4/6/21  4. 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 She has two children (Kashmir in high school, Jany in college).    Family history: Father had unknown types of skin cancer. Patient and her father are no longer in contact for her to ask. No family history of pancreatic cancer.   ____________________________________________    Assessment & Plan:  # NUB - R shin - ddx: DN vs MM vs other  - shave bx today - see procedure below    # History of melanoma, stage IIB on the left back, no clinical evidence of recurrence.   - ABCDs of melanoma were discussed and self skin checks were advised.   - Sun precaution was advised including the use of sunscreens of SPF 30 or higher, sun protective clothing, and avoidance of tanning beds.  - Recommended yearly dental, gyn, and ophthalmology examinations.  - Recommended first degree relatives  get yearly skin exams as well.  - Follow-up in 6 months for repeat FBSE.     # History of DN: No evidence of recurrence.   - While these are benign, they are a marker for increased melanoma risk.  - Recommended yearly skin checks.    # Sun damaged skin with solar lentigines: Chronic, stable.  - Recommend sunscreens SPF #30 or greater, protective clothing and avoidance of tanning beds.     # Benign skin findings including: seborrheic keratoses, cherry angioma, skin tags, sebaceous hyperplasia - minor, self limited problem  - No further intervention required. Patient to report changes.   - Patient reassured of the benign nature of these lesions.     # Multiple clinically benign nevi: Chronic, stable  - No further intervention required. Patient to report changes.   - Patient reassured of the benign nature of these lesions.     # Monitor: Nevus on right lower back. Unchanged on dermoscopy today.      Procedures Performed:   - Shave biopsy procedure note, location(s): R shin. After discussion of benefits and risks including but not limited to bleeding, infection, scar, incomplete removal, recurrence, and non-diagnostic biopsy, written consent and photographs were obtained. The area was cleaned with isopropyl alcohol. 0.5mL of 1% lidocaine with epinephrine was injected to obtain adequate anesthesia of lesion(s). Shave biopsy at site(s) performed. Hemostasis was achieved with aluminium chloride. Petrolatum ointment and a sterile dressing were applied. The patient tolerated the procedure and no complications were noted. The patient was provided with verbal and written post care instructions.      Follow-up: 6 month(s) in-person, or earlier for new or changing lesions    Staff:     All risks, benefits and alternatives were discussed with patient.  Patient is in agreement and understands the assessment and plan.  All questions were answered.    Shea Marsh PA-C, MPAS  [facility] [place]  [facility] [place]: Phone: [phone], Fax: [phone]  [facility]: Phone: [phone],  Fax: [phone]  ____________________________________________    CC: Skin Check      HPI:  Ms. Nancy Bronson is a 52 year old female who presents today as a new patient for a FBSE. Hx Melanoma, stage IIB.  Also hx of DNs in the past. No unintended weight loss/gain, no night sweats or fevers. She has no areas of concern. Feeling well.  Patient is otherwise feeling well, without additional concerns.    Labs:  none    Physical Exam:  Vitals: LMP 12/02/2012   SKIN: Full skin, which includes the head/face, both arms, chest, back, abdomen,both legs, genitalia and/or groin buttocks, digits and/or nails, was examined.   - L shin- 4mm irregular tan macule  - Archuleta's skin type II  - Well healed flap on the upper to mid back. No pigment recurrence or nodularity.   - Scattered brown macules on sun exposed areas, particularly the shoulders.  - Three linear scars with no pigment recurrence on the back (one on the right, two on the left).  - Skin tags in axillae and at neck.  - Linear scar at left lateral chest from EIC removal.  - Scattered sebaceous hyperplasia on the face.  - There are dome shaped bright red papules on the face, scalp, and trunk.   - Multiple regular brown pigmented macules and papules. Some are larger than 6mm in size but these are uniform. Some have borders that feather off in the periphery or are patchy on dermoscopy. None have significant atypia on dermoscopic examination today.   -On the right lower back at wait band line, 4mm oval shaped nevus is symmetric and unchanged on dermoscopy from last visit. Will continue monitor.  - There are waxy stuck on tan to brown papules on the trunk.  - No other lesions of concern on areas examined.   LYMPH: Examination of the pre/post auricular, occipital, cervical, clavicular, axillary and inguinal lymph nodes was  negative.                Medications:  No current outpatient medications on file.     No current facility-administered medications for this visit.      Past Medical/Surgical History:   Patient Active Problem List   Diagnosis     Snoring     S/P hysterectomy     Anemia     Malignant melanoma of torso excluding breast (H)     Past Medical History:   Diagnosis Date     Anemia      Malignant melanoma (H)      NO ACTIVE PROBLEMS

## 2023-03-09 NOTE — LETTER
3/9/2023         RE: Nancy Bronson  [address] [address]        Dear Colleague,    Thank you for referring your patient, Nancy Bronson, to the [facility]. Please see a copy of my visit note below.    [facility] Dermatology Note  Encounter Date: Mar 9, 2023  Office Visit      Dermatology Problem List:  Total Body Photography completed 3/26/21  Skin checks q6 months through 7/2023  0. NUB - R shin - s/p bx 3/9/23  1. History of melanoma, left back, Stage IIB (cT3b, cN0, cM0)   -Initial biopsy on 6/2019: BD at least 3.5 with ulceration and 10 mitoses/mm2  - s/p WLE with keystone flap and negative left axillary sentinel lymph node biopsy (7/18/2019) with Gwyn Raman and Tate   2. History of dysplastic nevi  -Moderately atypical nevi x2 of the medial left upper back and mid upper back, s/p biopsy 9/10/19 with pigment recurrence 12/2019, s/p excision 3/4/20  -Nevus with congenital features and junctional atypia, right upper back, s/p biopsy 3/4/20, pigment recurrence noted 6/12/20, re-excised 7/23/20  3. DF + EIC, left lateral chest, s/p excision 4/6/21  4. 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 She has two children (Kashmir in high school, Jany in college).    Family history: Father had unknown types of skin cancer. Patient and her father are no longer in contact for her to ask. No family history of pancreatic cancer.   ____________________________________________    Assessment & Plan:  # NUB - R shin - ddx: DN vs MM vs other  - shave bx today - see procedure below    # History of melanoma, stage IIB on the left back, no clinical evidence of recurrence.   - ABCDs of melanoma were discussed and self skin checks were  advised.   - Sun precaution was advised including the use of sunscreens of SPF 30 or higher, sun protective clothing, and avoidance of tanning beds.  - Recommended yearly dental, gyn, and ophthalmology examinations.  - Recommended first degree relatives get yearly skin exams as well.  - Follow-up in 6 months for repeat FBSE.     # History of DN: No evidence of recurrence.   - While these are benign, they are a marker for increased melanoma risk.  - Recommended yearly skin checks.    # Sun damaged skin with solar lentigines: Chronic, stable.  - Recommend sunscreens SPF #30 or greater, protective clothing and avoidance of tanning beds.     # Benign skin findings including: seborrheic keratoses, cherry angioma, skin tags, sebaceous hyperplasia - minor, self limited problem  - No further intervention required. Patient to report changes.   - Patient reassured of the benign nature of these lesions.     # Multiple clinically benign nevi: Chronic, stable  - No further intervention required. Patient to report changes.   - Patient reassured of the benign nature of these lesions.     # Monitor: Nevus on right lower back. Unchanged on dermoscopy today.      Procedures Performed:   - Shave biopsy procedure note, location(s): R shin. After discussion of benefits and risks including but not limited to bleeding, infection, scar, incomplete removal, recurrence, and non-diagnostic biopsy, written consent and photographs were obtained. The area was cleaned with isopropyl alcohol. 0.5mL of 1% lidocaine with epinephrine was injected to obtain adequate anesthesia of lesion(s). Shave biopsy at site(s) performed. Hemostasis was achieved with aluminium chloride. Petrolatum ointment and a sterile dressing were applied. The patient tolerated the procedure and no complications were noted. The patient was provided with verbal and written post care instructions.      Follow-up: 6 month(s) in-person, or earlier for new or changing  lesions    Staff:     All risks, benefits and alternatives were discussed with patient.  Patient is in agreement and understands the assessment and plan.  All questions were answered.    Shea Marsh PA-C, MPAS  [facility] [place]: Phone: [phone], Fax: [phone]  [facility]: Phone: [phone],  Fax: [phone]  ____________________________________________    CC: Skin Check      HPI:  Ms. Nancy Bronson is a 52 year old female who presents today as a new patient for a FBSE. Hx Melanoma, stage IIB.  Also hx of DNs in the past. No unintended weight loss/gain, no night sweats or fevers. She has no areas of concern. Feeling well.  Patient is otherwise feeling well, without additional concerns.    Labs:  none    Physical Exam:  Vitals: LMP 12/02/2012   SKIN: Full skin, which includes the head/face, both arms, chest, back, abdomen,both legs, genitalia and/or groin buttocks, digits and/or nails, was examined.   - L shin- 4mm irregular tan macule  - Archuleta's skin type II  - Well healed flap on the upper to mid back. No pigment recurrence or nodularity.   - Scattered brown macules on sun exposed areas, particularly the shoulders.  - Three linear scars with no pigment recurrence on the back (one on the right, two on the left).  - Skin tags in axillae and at neck.  - Linear scar at left lateral chest from EIC removal.  - Scattered sebaceous hyperplasia on the face.  - There are dome shaped bright red papules on the face, scalp, and trunk.   - Multiple regular brown pigmented macules and papules. Some are larger than 6mm in size but these are uniform. Some have borders that feather off in the periphery or are patchy on dermoscopy. None have significant atypia on dermoscopic examination today.   -On the right lower back at wait band line, 4mm oval shaped nevus is symmetric and unchanged on dermoscopy from last visit. Will  continue monitor.  - There are waxy stuck on tan to brown papules on the trunk.  - No other lesions of concern on areas examined.   LYMPH: Examination of the pre/post auricular, occipital, cervical, clavicular, axillary and inguinal lymph nodes was negative.                Medications:  No current outpatient medications on file.     No current facility-administered medications for this visit.      Past Medical/Surgical History:   Patient Active Problem List   Diagnosis     Snoring     S/P hysterectomy     Anemia     Malignant melanoma of torso excluding breast (H)     Past Medical History:   Diagnosis Date     Anemia      Malignant melanoma (H)      NO ACTIVE PROBLEMS                           Again, thank you for allowing me to participate in the care of your patient.        Sincerely,        Shea Marsh PA-C

## 2023-03-14 LAB
PATH REPORT.COMMENTS IMP SPEC: NORMAL
PATH REPORT.FINAL DX SPEC: NORMAL
PATH REPORT.GROSS SPEC: NORMAL
PATH REPORT.MICROSCOPIC SPEC OTHER STN: NORMAL
PATH REPORT.RELEVANT HX SPEC: NORMAL

## 2023-03-15 ENCOUNTER — TELEPHONE (OUTPATIENT)
Dept: FAMILY MEDICINE | Facility: CLINIC | Age: 53
End: 2023-03-15
Payer: COMMERCIAL

## 2023-03-15 NOTE — TELEPHONE ENCOUNTER
----- Message from Shea Marsh PA-C sent at 3/15/2023  9:23 AM CDT -----  Please schedule for excision with me, will need to go over procedure with patient -thanks!    We received your biopsy results back. I wanted to let you know the biopsy of your shin showed a moderate to severe dysplastic (or atypical) nevus. Dysplastic nevi are moles that have atypical cells. Atypical cells are graded as mild, moderate, or severe. Mild and moderate are considered variants of normal. Severe dysplastic nevi we recommend a secondary procedure to ensure complete removal of the spot with a small margin. One of my nurses should reach out to you to set up a time to come back and have a little more skin taken. She can go over the procedure with you on the phone as well.      These are a marker for increased risk of melanoma and therefore we recommend next skin check in one year

## 2023-03-15 NOTE — TELEPHONE ENCOUNTER
S/w pt and gave Shea's message below and scheduled excision on 5/4 at 4 pm at  Skin clinic.  Advised Shea's message was also sent via my chart if pt wanted to read again.  Pt states understanding.    Laura HARRISON RN  [facility] [place]  [phone]

## 2023-05-04 ENCOUNTER — OFFICE VISIT (OUTPATIENT)
Dept: FAMILY MEDICINE | Facility: CLINIC | Age: 53
End: 2023-05-04
Payer: COMMERCIAL

## 2023-05-04 DIAGNOSIS — D23.9 DYSPLASTIC NEVUS: Primary | ICD-10-CM

## 2023-05-04 DIAGNOSIS — D23.71 DYSPLASTIC NEVUS OF RIGHT LOWER EXTREMITY: ICD-10-CM

## 2023-05-04 PROCEDURE — 11402 EXC TR-EXT B9+MARG 1.1-2 CM: CPT | Performed by: PHYSICIAN ASSISTANT

## 2023-05-04 PROCEDURE — 12032 INTMD RPR S/A/T/EXT 2.6-7.5: CPT | Performed by: PHYSICIAN ASSISTANT

## 2023-05-04 PROCEDURE — 88305 TISSUE EXAM BY PATHOLOGIST: CPT | Performed by: DERMATOLOGY

## 2023-05-04 ASSESSMENT — PAIN SCALES - GENERAL: PAINLEVEL: NO PAIN (0)

## 2023-05-04 NOTE — PATIENT INSTRUCTIONS
Excision/Mohs Wound Care Instructions  I will experience scar, altered skin color, bleeding, swelling, pain, crusting and redness. I may experience altered sensation. Risks are excessive bleeding, infection, muscle weakness, thick (hypertrophic or keloidal) scar, and recurrence. A second procedure may be recommended to obtain the best cosmetic or functional result.  Possible complications of any surgical procedure are bleeding, infection, scarring, alteration in skin color and sensation, muscle weakness in the area, wound dehiscence or seperation, or recurrence of the lesion or disease. On occasion, after healing, a secondary procedure or revision may be recommended in order to obtain the best cosmetic or functional result.   After your surgery, a pressure bandage will be placed over the area that has sutures. This will help prevent bleeding. Please follow these instructions until you come back to clinic for suture removal on 10-14, as they will help you to prevent complications as your wound heals.  For the First 48 hours After Surgery:  Leave the pressure bandage on and keep it dry. If it should come loose, you may retape it, but do not take it off.  Relax and take it easy. Do not do any vigorous exercise, heavy lifting, or bending forward. This could cause the wound to bleed.  Post-operative pain is usually mild. You may alternate between 1000 mg of Tylenol (acetaminophen) and 400 mg of Ibuprofen every 4 hours.  Do not take more than 4,000mg of acetaminophen in a 24 hour period or 3200 mg of Ibuprofen in a 24 hr period.  Avoid alcohol and vitamin E as these may increase your tendency to bleed.  You may put an ice pack around the bandaged area for 20 minutes every 2-3 hours. This may help reduce swelling, bruising, and pain. Make sure the ice pack is waterproof so that the pressure bandage does not get wet.   You may see a small amount of drainage or blood on your pressure bandage. This is normal. However, if  drainage or bleeding continues or saturates the bandage, you will need to apply firm pressure over the bandage with a washcloth for 15 minutes. If bleeding continues after applying pressure for 15 minutes then go to the nearest emergency room.  48 Hours After Surgery  Carefully remove the bandage and start daily wound care and dressing changes. You may also now shower and get the wound wet.  Daily Wound Care:  Wash wound with a mild soap and water.  Use caution when washing the wound, be gentle and do not let the forceful shower stream hit the wound directly.  Pat dry.  Apply Vaseline (from a new container or tube) over the suture line with a Q-tip. It is very important to keep the wound continuously moist, as wounds heal best in a moist environment.  Keep the site covered until sutures have either been removed or dissolved.  You can cover it with a Telfa (non-stick) dressing and tape or a band-aid.    If you are unable to keep wound covered, you must apply Vaseline every 2-3 hours (while awake) to ensure it is being kept moist for optimal healing. A dressing overnight is recommended to keep the area moist.  Call Us If:  You have pain that is not controlled with Tylenol/Ibuprofen  You have signs or symptoms of an infection, such as: fever over 100 degrees F, redness, warmth, or foul-smelling or yellow drainage from the wound.  Who should I call with questions?  [facility]: [phone]   [facility]: [phone]  For urgent needs outside of business hours call the [facility] at [phone] and ask to speak with the dermatology resident on call

## 2023-05-04 NOTE — PROGRESS NOTES
DERMATOLOGIC EXCISION SURGERY PROCEDURE NOTE     Encounter Date: May 4, 2023  Office Visit       Dermatology Problem List:  Total Body Photography completed 3/26/21  Skin checks q6 months through 7/2023  1. History of melanoma, left back, Stage IIB (cT3b, cN0, cM0)   -Initial biopsy on 6/2019: BD at least 3.5 with ulceration and 10 mitoses/mm2  - s/p WLE with keystone flap and negative left axillary sentinel lymph node biopsy (7/18/2019) with Gwyn Raman and Tate   2. History of dysplastic nevi  - Moderately atypical nevi x2 of the medial left upper back and mid upper back, s/p biopsy 9/10/19 with pigment recurrence 12/2019, s/p excision 3/4/20  - Nevus with congenital features and junctional atypia, right upper back, s/p biopsy 3/4/20, pigment recurrence noted 6/12/20, re-excised 7/23/20  - Severely dysplastic nevus - R shin - s/p excision 5/4/23  3. DF + EIC, left lateral chest, s/p excision 4/6/21  4. 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 She has two children (Kashmir in high school, Jany in college).    Family history: Father had unknown types of skin cancer. Patient and her father are no longer in contact for her to ask. No family history of pancreatic cancer.     NAME OF PROCEDURE: Excision with intermediate linear closure  Staff surgeon: Shea Marsh PA-C    PRE-OPERATIVE DIAGNOSIS:  Dysplastic nevus  POST-OPERATIVE DIAGNOSIS: Same   LOCATION: right shin  FINAL EXCISION SIZE(DEFECT SIZE): 1.4 cm  MARGIN: 0.5 cm  FINAL REPAIR LENGTH: 3.0 cm   ANESTHESIA: local 1% lidocaine    INDICATIONS: This patient presented with a 4mm severely dysplastic nevus on the R shin. Excision was indicated.   We discussed the principles of treatment and most likely complications including  bleeding, infection, scarring, alteration in skin color and sensation, wound dehiscence,muscle weakness in the area, or recurrence of the lesion or disease. We reviewed that on occasion, after healing, a secondary procedure or revision may be recommended in order to obtain the best cosmetic or functional result.   PROCEDURE: The patient was taken to the operative suite. Time-out was performed. The treatment area was anesthetized. The area was washed with Hibiclens, rinsed with saline and draped with sterile towels. The lesion was delineated and excised down to deep subcutaneous fat in a fusiform manner. Hemostasis was obtained by electrocoagulation.      REPAIR: An intermediate layered linear closure was selected as the procedure which would maximally preserve both function and cosmesis.    After the excision of the tumor, the area was carefully undermined. Hemostasis was obtained with electrocoagulation.  Closure was oriented so that the wound was in the patient's natural skin tension lines. The subcutaneous and dermal layers were then closed with 3-0 vicryl sutures. The epidermis was then carefully approximated along the length of the wound using 4-0 prolene simple running sutures.     The final wound length was 3 cm. A total of 3 ml of anesthesia was administered for all surgical sites. Estimated blood loss was less than 10 ml for all surgical sites. A sterile pressure dressing was applied and wound care instructions, with a written handout, were given. The patient was [facility] alert and ambulatory.    The patient elected for pathology results to automatically release and understands that the clinical staff will contact them as soon as possible to notify them of the results.   Follow up for suture removal in 7-10 days and in dermatology clinic on 9/14/23 as previously scheduled.   Staff Involved:  Staff Only      Scribe Disclosure:  Naseem PEREZ, am serving as a scribe  to document services personally performed by Shea Marsh PA-C based on data collection and the provider's statements to me.   Provider Disclosure:   The documentation recorded by the scribe accurately reflects the services I personally performed and the decisions made by me.    All risks, benefits and alternatives were discussed with patient.  Patient is in agreement and understands the assessment and plan.  All questions were answered.    Shea Marsh PA-C, [facility]  [facility] [place]: Phone: [phone], Fax: [phone]  [facility]: Phone: [phone],  Fax: [phone]  [facility]: Phone: [phone], Fax: [phone]

## 2023-05-04 NOTE — LETTER
5/4/2023         RE: Nancy Bronson  [address] [address]        Dear Colleague,    Thank you for referring your patient, Nancy Bronson, to the [facility]. Please see a copy of my visit note below.    DERMATOLOGIC EXCISION SURGERY PROCEDURE NOTE     Encounter Date: May 4, 2023  Office Visit       Dermatology Problem List:  Total Body Photography completed 3/26/21  Skin checks q6 months through 7/2023  1. History of melanoma, left back, Stage IIB (cT3b, cN0, cM0)   -Initial biopsy on 6/2019: BD at least 3.5 with ulceration and 10 mitoses/mm2  - s/p WLE with keystone flap and negative left axillary sentinel lymph node biopsy (7/18/2019) with Gwyn Raman and Tate   2. History of dysplastic nevi  - Moderately atypical nevi x2 of the medial left upper back and mid upper back, s/p biopsy 9/10/19 with pigment recurrence 12/2019, s/p excision 3/4/20  - Nevus with congenital features and junctional atypia, right upper back, s/p biopsy 3/4/20, pigment recurrence noted 6/12/20, re-excised 7/23/20  - Severely dysplastic nevus - R shin - s/p excision 5/4/23  3. DF + EIC, left lateral chest, s/p excision 4/6/21  4. 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 She has two children (Kashmir in high school, Jany in college).    Family history: Father had unknown types of skin cancer. Patient and her father are no longer in contact for her to ask. No family history of pancreatic cancer.     NAME OF PROCEDURE: Excision with intermediate linear closure  Staff surgeon: Shea Marsh PA-C    PRE-OPERATIVE DIAGNOSIS:  Dysplastic nevus  POST-OPERATIVE DIAGNOSIS: Same   LOCATION: right shin  FINAL EXCISION SIZE(DEFECT SIZE): 1.4 cm  MARGIN: 0.5 cm  FINAL REPAIR  LENGTH: 3.0 cm   ANESTHESIA: local 1% lidocaine    INDICATIONS: This patient presented with a 4mm severely dysplastic nevus on the R shin. Excision was indicated.   We discussed the principles of treatment and most likely complications including bleeding, infection, scarring, alteration in skin color and sensation, wound dehiscence,muscle weakness in the area, or recurrence of the lesion or disease. We reviewed that on occasion, after healing, a secondary procedure or revision may be recommended in order to obtain the best cosmetic or functional result.   PROCEDURE: The patient was taken to the operative suite. Time-out was performed. The treatment area was anesthetized. The area was washed with Hibiclens, rinsed with saline and draped with sterile towels. The lesion was delineated and excised down to deep subcutaneous fat in a fusiform manner. Hemostasis was obtained by electrocoagulation.      REPAIR: An intermediate layered linear closure was selected as the procedure which would maximally preserve both function and cosmesis.    After the excision of the tumor, the area was carefully undermined. Hemostasis was obtained with electrocoagulation.  Closure was oriented so that the wound was in the patient's natural skin tension lines. The subcutaneous and dermal layers were then closed with 3-0 vicryl sutures. The epidermis was then carefully approximated along the length of the wound using 4-0 prolene simple running sutures.     The final wound length was 3 cm. A total of 3 ml of anesthesia was administered for all surgical sites. Estimated blood loss was less than 10 ml for all surgical sites. A sterile pressure dressing was applied and wound care instructions, with a written handout, were given. The patient was discharged from the [facility] alert and ambulatory.    The patient elected for pathology results to automatically release and understands that the clinical staff will contact them as soon  as possible to notify them of the results.   Follow up for suture removal in 7-10 days and in dermatology clinic on 9/14/23 as previously scheduled.   Staff Involved:  Staff Only      Scribe Disclosure:  I, August Chatterjee, am serving as a scribe to document services personally performed by Shea Marsh PA-C based on data collection and the provider's statements to me.   Provider Disclosure:   The documentation recorded by the scribe accurately reflects the services I personally performed and the decisions made by me.    All risks, benefits and alternatives were discussed with patient.  Patient is in agreement and understands the assessment and plan.  All questions were answered.    Shea Marsh PA-C, [facility]  [facility] [employer]: Phone: [phone], Fax: [phone]  [facility]: Phone: [phone],  Fax: [phone]  [facility]: Phone: [phone], Fax: [phone]          Again, thank you for allowing me to participate in the care of your patient.        Sincerely,        Shea Marsh PA-C

## 2023-05-08 LAB
PATH REPORT.COMMENTS IMP SPEC: NORMAL
PATH REPORT.COMMENTS IMP SPEC: NORMAL
PATH REPORT.FINAL DX SPEC: NORMAL
PATH REPORT.GROSS SPEC: NORMAL
PATH REPORT.MICROSCOPIC SPEC OTHER STN: NORMAL
PATH REPORT.RELEVANT HX SPEC: NORMAL

## 2023-05-15 NOTE — PROGRESS NOTES
.FOLLOW UP  May 17, 2023     Nancy Bronson is a 53 year old woman who presents with with history of melanoma.      HPI:     She underwent a wide local excision of left back melanoma and left axillary sentinel lymph node biopsy with Dr. Raman and keystone flap with Dr. Ybarra 7/18/19 (Stage IIB T3bN0).     Since her last visit, she has been doing well. She denies any headaches, dizziness, vision changes, abdominal pain, bowel habit changes, rectal bleeding, melena, chest pain, shortness of breath, or unintentional weight loss.  She has not noted any masses in either axilla. She has good range of motion of her shoulders and denies any upper extremity swelling. She denies any new skin lesions or nodules.      Her last skin check was performed by Shea Marsh 5/4/23. She had an excision of a right shin dysplasic nevi.     PAST MEDICAL HISTORY  Past Medical History:   Diagnosis Date     Anemia      Malignant melanoma (H)      NO ACTIVE PROBLEMS      PAST SURGICAL HISTORY   Past Surgical History:   Procedure Laterality Date     ABDOMEN SURGERY  2012    hysterectomy     BIOPSY NODE SENTINEL Left 7/18/2019    Procedure: [place] Lymph Node Mapping and Biopsy;  Surgeon: Susan Raman MD;  Location:  OR     COLONOSCOPY N/A 6/28/2021    Procedure: COLONOSCOPY (fv) - would like prep emailed;  Surgeon: Cris Avelar MD;  Location:  GI     DAVINCI HYSTERECTOMY SUPRACERVICAL  12/4/2012    benign pathProcedure: DAVINCI HYSTERECTOMY SUPRACERVICAL;  Davinci Supracervical Hysterectomy with Bilateral Salpingectomy, Cystoscopy ;  Surgeon: Selene Cardenas DO;  Location:  OR     ENT SURGERY  1975     EXCISE LESION BACK N/A 7/18/2019    Procedure: Wide Local Excision of Back Melanoma;  Surgeon: Susan Raman MD;  Location:  OR     HEAD & NECK SURGERY  1986    jaw surgery     LAPAROSCOPIC TUBAL LIGATION  2007     PROCEDURE PLACEHOLDER PLASTIC N/A 7/18/2019    Procedure: Back Reconstruction With Local  Tissue Rearrangement;  Surgeon: DIPAK Ybarra MD;  Location:  OR     [facility] C-SEC ONLY,PREV C-SEC  2001,2004    2     [facility] NONSPECIFIC PROCEDURE  1975    tonsils     [facility] NONSPECIFIC PROCEDURE  1985    jaw surgery     MEDICATIONS  No current outpatient medications on file.      ALLERGIES   No Known Allergies     SOCIAL HISTORY:  Smokes: No  EtOH: No  Wearing sunscreen and limiting sun exposure.     ROS:  Change in vision No  Headaches: no  Respiratory: No shortness of breath, dyspnea on exertion, cough, or hemoptysis   Cardiovascular: negative   Gastrointestinal: negative Abdominal pain: no  Musculoskeletal: negative Joint pain No Back pain: no  Psychiatric: negative  Hematologic/Lymphatic/Immunologic: negative  Endocrine: negative    EXAM  LMP 12/02/2012    PHYSICAL EXAM  Respiratory: breathing non labored.   Skin: back wide local excision site unremarkable. Centrally on the back there is an additional scar from dysplastic nevus resection. No nodularity. Left axillary scar well healed.  Left shin with incision clean, dry and intact. She has a mild reaction to the bandage. Prolene suture was removed. Steri-strips were applied.    No clavicular, cervical, axillary or inguinal lymphadenopathy.   Abdomen is soft, nontender nondistended. No masses or hepatomegaly.     ASSESSMENT/PLAN:    Nancy Bronson is a 53 year old with truncal melanoma, 4 years s/p surgery.     No concerning findings on exam. We reviewed that surveillance involves a history and physical exam every 6 months for the first 5 years, then annually, with imaging studies only indicated if symptoms arise.   We reviewed the signs and symptoms that could arise in the setting of recurrent locoregional or metastatic disease. In addition, she will need to undergo regular dermatologic full skin survey and evaluation given that patients with a diagnosis of melanoma are at risk of recurrence (local and distant) and of subsequent de sherrill melanoma.  I also  reviewed the importance of protection from sun exposure, including wearing long sleeves, hats, etc and also the use of sunscreen with SPF of at least 35, with frequent re-applications.      PLAN:  1. Follow up with Dermatology  2. Follow up in 6 months: 11/2023        Bekah Crawford PA-C    20 minutes spent on the date of the encounter doing chart review, review of test results, interpretation of tests, patient visit and documentation.

## 2023-05-17 ENCOUNTER — OFFICE VISIT (OUTPATIENT)
Dept: SURGERY | Facility: CLINIC | Age: 53
End: 2023-05-17
Attending: PHYSICIAN ASSISTANT
Payer: COMMERCIAL

## 2023-05-17 VITALS
HEART RATE: 88 BPM | SYSTOLIC BLOOD PRESSURE: 128 MMHG | RESPIRATION RATE: 16 BRPM | WEIGHT: 225.1 LBS | OXYGEN SATURATION: 97 % | DIASTOLIC BLOOD PRESSURE: 81 MMHG | BODY MASS INDEX: 35.75 KG/M2 | TEMPERATURE: 98.4 F

## 2023-05-17 DIAGNOSIS — C43.59 MALIGNANT MELANOMA OF TORSO EXCLUDING BREAST (H): Primary | ICD-10-CM

## 2023-05-17 PROCEDURE — 99213 OFFICE O/P EST LOW 20 MIN: CPT | Performed by: PHYSICIAN ASSISTANT

## 2023-05-17 PROCEDURE — G0463 HOSPITAL OUTPT CLINIC VISIT: HCPCS | Performed by: PHYSICIAN ASSISTANT

## 2023-05-17 ASSESSMENT — PAIN SCALES - GENERAL: PAINLEVEL: NO PAIN (0)

## 2023-05-17 NOTE — NURSING NOTE
"Oncology Rooming Note    May 17, 2023 11:47 AM   Nancy Bronson is a 53 year old female who presents for:    Chief Complaint   Patient presents with     Oncology Clinic Visit     Return- melanoma     Initial Vitals: /81 (BP Location: Right arm, Patient Position: Sitting, Cuff Size: Adult Large)   Pulse 88   Temp 98.4  F (36.9  C) (Oral)   Resp 16   Wt 102.1 kg (225 lb 1.6 oz)   LMP 12/02/2012   SpO2 97%   BMI 35.75 kg/m   Estimated body mass index is 35.75 kg/m  as calculated from the following:    Height as of 11/16/22: 1.69 m (5' 6.54\").    Weight as of this encounter: 102.1 kg (225 lb 1.6 oz). Body surface area is 2.19 meters squared.  No Pain (0) Comment: Data Unavailable   Patient's last menstrual period was 12/02/2012.  Allergies reviewed: Yes  Medications reviewed: Yes    Medications: Medication refills not needed today.  Pharmacy name entered into [employer]:   [id number] - [place], [place] - [facility] [address] AT [facility] [address]    Clinical concerns: No new clinical concerns other than reason for visit today.      Liz Brooks            "

## 2023-05-17 NOTE — PATIENT INSTRUCTIONS
Nancy Bronson is a 53 year old with truncal melanoma, 4 years s/p surgery.     No concerning findings on exam. We reviewed that surveillance involves a history and physical exam every 6 months for the first 5 years, then annually, with imaging studies only indicated if symptoms arise.   We reviewed the signs and symptoms that could arise in the setting of recurrent locoregional or metastatic disease. In addition, she will need to undergo regular dermatologic full skin survey and evaluation given that patients with a diagnosis of melanoma are at risk of recurrence (local and distant) and of subsequent de sherrill melanoma.  I also reviewed the importance of protection from sun exposure, including wearing long sleeves, hats, etc and also the use of sunscreen with SPF of at least 35, with frequent re-applications.      PLAN:  Follow up with Dermatology  Follow up in 6 months

## 2023-05-17 NOTE — LETTER
5/17/2023         RE: Nancy Bronson  [address] [facility] [address]        Dear Colleague,    Thank you for referring your patient, Nancy Bronson, to the [facility]. Please see a copy of my visit note below.    .FOLLOW UP  May 17, 2023     Nancy Bronson is a 53 year old woman who presents with with history of melanoma.      HPI:     She underwent a wide local excision of left back melanoma and left axillary sentinel lymph node biopsy with Dr. Raman and keystone flap with Dr. Ybarra 7/18/19 (Stage IIB T3bN0).     Since her last visit, she has been doing well. She denies any headaches, dizziness, vision changes, abdominal pain, bowel habit changes, rectal bleeding, melena, chest pain, shortness of breath, or unintentional weight loss.  She has not noted any masses in either axilla. She has good range of motion of her shoulders and denies any upper extremity swelling. She denies any new skin lesions or nodules.      Her last skin check was performed by Shea Mrash 5/4/23. She had an excision of a right shin dysplasic nevi.     PAST MEDICAL HISTORY  Past Medical History:   Diagnosis Date    Anemia     Malignant melanoma (H)     NO ACTIVE PROBLEMS      PAST SURGICAL HISTORY   Past Surgical History:   Procedure Laterality Date    ABDOMEN SURGERY  2012    hysterectomy    BIOPSY NODE SENTINEL Left 7/18/2019    Procedure: [place] Lymph Node Mapping and Biopsy;  Surgeon: Susan Raman MD;  [facility]: [facility]    COLONOSCOPY N/A 6/28/2021    Procedure: COLONOSCOPY (fv) - would like prep emailed;  Surgeon: Cris Avelar MD;  Location:  GI    DAVINCI HYSTERECTOMY SUPRACERVICAL  12/4/2012    benign pathProcedure: DAVINCI HYSTERECTOMY SUPRACERVICAL;  Davinci Supracervical Hysterectomy with Bilateral Salpingectomy, Cystoscopy ;  Surgeon: Selene Cardenas DO;  Location:  OR    ENT SURGERY  1975    EXCISE LESION BACK N/A 7/18/2019    Procedure: Wide Local Excision of  Back Melanoma;  Surgeon: Susan Raman MD;  Location:  OR    HEAD & NECK SURGERY  1986    jaw surgery    LAPAROSCOPIC TUBAL LIGATION  2007    PROCEDURE PLACEHOLDER PLASTIC N/A 7/18/2019    Procedure: Back Reconstruction With Local Tissue Rearrangement;  Surgeon: DIPAK Ybarra MD;  Location:  OR    [facility] C-SEC ONLY,PREV C-SEC  2001,2004    2    [facility] NONSPECIFIC PROCEDURE  1975    tonsils    [facility] NONSPECIFIC PROCEDURE  1985    jaw surgery     MEDICATIONS  No current outpatient medications on file.      ALLERGIES   No Known Allergies     SOCIAL HISTORY:  Smokes: No  EtOH: No  Wearing sunscreen and limiting sun exposure.     ROS:  Change in vision No  Headaches: no  Respiratory: No shortness of breath, dyspnea on exertion, cough, or hemoptysis   Cardiovascular: negative   Gastrointestinal: negative Abdominal pain: no  Musculoskeletal: negative Joint pain No Back pain: no  Psychiatric: negative  Hematologic/Lymphatic/Immunologic: negative  Endocrine: negative    EXAM  LMP 12/02/2012    PHYSICAL EXAM  Respiratory: breathing non labored.   Skin: back wide local excision site unremarkable. Centrally on the back there is an additional scar from dysplastic nevus resection. No nodularity. Left axillary scar well healed.  Left shin with incision clean, dry and intact. She has a mild reaction to the bandage. Prolene suture was removed. Steri-strips were applied.    No clavicular, cervical, axillary or inguinal lymphadenopathy.   Abdomen is soft, nontender nondistended. No masses or hepatomegaly.     ASSESSMENT/PLAN:    Nancy Bronson is a 53 year old with truncal melanoma, 4 years s/p surgery.     No concerning findings on exam. We reviewed that surveillance involves a history and physical exam every 6 months for the first 5 years, then annually, with imaging studies only indicated if symptoms arise.   We reviewed the signs and symptoms that could arise in the setting of recurrent locoregional or metastatic  disease. In addition, she will need to undergo regular dermatologic full skin survey and evaluation given that patients with a diagnosis of melanoma are at risk of recurrence (local and distant) and of subsequent de sherrill melanoma.  I also reviewed the importance of protection from sun exposure, including wearing long sleeves, hats, etc and also the use of sunscreen with SPF of at least 35, with frequent re-applications.      PLAN:  Follow up with Dermatology  Follow up in 6 months: 11/2023        Bekah Crawford PA-C    20 minutes spent on the date of the encounter doing chart review, review of test results, interpretation of tests, patient visit and documentation.

## 2023-09-10 ENCOUNTER — HEALTH MAINTENANCE LETTER (OUTPATIENT)
Age: 53
End: 2023-09-10

## 2023-09-14 ENCOUNTER — OFFICE VISIT (OUTPATIENT)
Dept: FAMILY MEDICINE | Facility: CLINIC | Age: 53
End: 2023-09-14
Payer: COMMERCIAL

## 2023-09-14 DIAGNOSIS — D22.9 MULTIPLE BENIGN NEVI: ICD-10-CM

## 2023-09-14 DIAGNOSIS — C43.59 MALIGNANT MELANOMA OF TORSO EXCLUDING BREAST (H): Primary | ICD-10-CM

## 2023-09-14 DIAGNOSIS — L82.1 SEBORRHEIC KERATOSES: ICD-10-CM

## 2023-09-14 DIAGNOSIS — L81.4 LENTIGINES: ICD-10-CM

## 2023-09-14 DIAGNOSIS — D18.01 CHERRY ANGIOMA: ICD-10-CM

## 2023-09-14 DIAGNOSIS — Z87.898 HISTORY OF ATYPICAL NEVUS: ICD-10-CM

## 2023-09-14 DIAGNOSIS — L73.9 FOLLICULITIS: ICD-10-CM

## 2023-09-14 PROCEDURE — 99214 OFFICE O/P EST MOD 30 MIN: CPT | Performed by: PHYSICIAN ASSISTANT

## 2023-09-14 ASSESSMENT — PAIN SCALES - GENERAL: PAINLEVEL: NO PAIN (0)

## 2023-09-14 NOTE — PATIENT INSTRUCTIONS
Hibiclens/chlorhexidine soap    Patient Education       Proper skin care from [facility]:    -Eliminate harsh soaps as they strip the natural oils from the skin, often resulting in dry itchy skin ( i.e. Dial, Zest, Huyen Spring)  -Use mild soaps such as Cetaphil or Dove Sensitive Skin in the shower. You do not need to use soap on arms, legs, and trunk every time you shower unless visibly soiled.   -Avoid hot or cold showers.  -After showering, lightly dry off and apply moisturizing within 2-3 minutes. This will help trap moisture in the skin.   -Aggressive use of a moisturizer at least 1-2 times a day to the entire body (including -Vanicream, Cetaphil, Aquaphor or Cerave) and moisturize hands after every washing.  -We recommend using moisturizers that come in a tub that needs to be scooped out, not a pump. This has more of an oil base. It will hold moisture in your skin much better than a water base moisturizer. The above recommended are non-pore clogging.      Wear a sunscreen with at least SPF 30 on your face, ears, neck and V of the chest daily. Wear sunscreen on other areas of the body if those areas are exposed to the sun throughout the day. Sunscreens can contain physical and/or chemical blockers. Physical blockers are less likely to clog pores, these include zinc oxide and titanium dioxide. Reapply every two hour and after swimming.     Sunscreen examples: https://www.ewg.org/sunscreen/    UV radiation  UVA radiation remains constant throughout the day and throughout the year. It is a longer wavelength than UVB and therefore penetrates deeper into the skin leading to immediate and delayed tanning, photoaging, and skin cancer. 70-80% of UVA and UVB radiation occurs between the hours of 10am-2pm.  UVB radiation  UVB radiation causes the most harmful effects and is more significant during the summer months. However, snow and ice can reflect UVB radiation leading to skin damage during the winter months  as well. UVB radiation is responsible for tanning, burning, inflammation, delayed erythema (pinkness), pigmentation (brown spots), and skin cancer.     I recommend self monthly full body exams and yearly full body exams with a dermatology provider. If you develop a new or changing lesion please follow up for examination. Most skin cancers are pink and scaly or pink and pearly. However, we do see blue/brown/black skin cancers.  Consider the ABCDEs of melanoma when giving yourself your monthly full body exam ( don't forget the groin, buttocks, feet, toes, etc). A-asymmetry, B-borders, C-color, D-diameter, E-elevation or evolving. If you see any of these changes please follow up in clinic. If you cannot see your back I recommend purchasing a hand held mirror to use with a larger wall mirror.       Checking for Skin Cancer  You can find cancer early by checking your skin each month. There are 3 kinds of skin cancer. They are melanoma, basal cell carcinoma, and squamous cell carcinoma. Doing monthly skin checks is the best way to find new marks or skin changes. Follow the instructions below for checking your skin.   The ABCDEs of checking moles for melanoma   Check your moles or growths for signs of melanoma using ABCDE:   Asymmetry: the sides of the mole or growth don t match  Border: the edges are ragged, notched, or blurred  Color: the color within the mole or growth varies  Diameter: the mole or growth is larger than 6 mm (size of a pencil eraser)  Evolving: the size, shape, or color of the mole or growth is changing (evolving is not shown in the images below)    Checking for other types of skin cancer  Basal cell carcinoma or squamous cell carcinoma have symptoms such as:     A spot or mole that looks different from all other marks on your skin  Changes in how an area feels, such as itching, tenderness, or pain  Changes in the skin's surface, such as oozing, bleeding, or scaliness  A sore that does not heal  New  swelling or redness beyond the border of a mole    Who s at risk?  Anyone can get skin cancer. But you are at greater risk if you have:   Fair skin, light-colored hair, or light-colored eyes  Many moles or abnormal moles on your skin  A history of sunburns from sunlight or tanning beds  A family history of skin cancer  A history of exposure to radiation or chemicals  A weakened immune system  If you have had skin cancer in the past, you are at risk for recurring skin cancer.   How to check your skin  Do your monthly skin checkups in front of a full-length mirror. Check all parts of your body, including your:   Head (ears, face, neck, and scalp)  Torso (front, back, and sides)  Arms (tops, undersides, upper, and lower armpits)  Hands (palms, backs, and fingers, including under the nails)  Buttocks and genitals  Legs (front, back, and sides)  Feet (tops, soles, toes, including under the nails, and between toes)  If you have a lot of moles, take digital photos of them each month. Make sure to take photos both up close and from a distance. These can help you see if any moles change over time.   Most skin changes are not cancer. But if you see any changes in your skin, call your doctor right away. Only he or she can diagnose a problem. If you have skin cancer, seeing your doctor can be the first step toward getting the treatment that could save your life.   [employer] last reviewed this educational content on 4/1/2019 2000-2020 [employer]. [address] [place] [address], [place] [address]. All rights reserved. This information is not intended as a substitute for professional medical care. Always follow your healthcare professional's instructions.       When should I call my doctor?  If you are worsening or not improving, please, contact us or seek urgent care as noted below.     Who should I call with questions (adults)?  [facility] (adult and pediatric): [phone]  [place]  [facility] (adult): [phone]  [facility] ([place], [place], [place] and [place]) [phone]  For urgent needs outside of business hours call the [facility] at [phone] and ask for the dermatology resident on call to be paged  If this is a medical emergency and you are unable to reach an ER, Call 911      If you need a prescription refill, please contact your pharmacy. Refills are approved or denied by our Physicians during normal business hours, Monday through Fridays  Per office policy, refills will not be granted if you have not been seen within the past year (or sooner depending on your child's condition)

## 2023-09-14 NOTE — PROGRESS NOTES
[facility] Dermatology Note  Encounter Date: Sep 14, 2023  Office Visit      Dermatology Problem List:  Total Body Photography completed 3/26/21  Skin checks q6 months through 7/2023  1. History of melanoma, left back, Stage IIB (cT3b, cN0, cM0)   -Initial biopsy on 6/2019: BD at least 3.5 with ulceration and 10 mitoses/mm2  - s/p WLE with keystone flap and negative left axillary sentinel lymph node biopsy (7/18/2019) with Gwyn Raman and Tate   2. History of dysplastic nevi  - Severely dysplastic nevus - R shin - s/p excision 5/4/23  - Moderately atypical nevi x2 of the medial left upper back and mid upper back, s/p biopsy 9/10/19 with pigment recurrence 12/2019, s/p excision 3/4/20  - Nevus with congenital features and junctional atypia, right upper back, s/p biopsy 3/4/20, pigment recurrence noted 6/12/20, re-excised 7/23/20  3. DF + EIC, left lateral chest, s/p excision 4/6/21  4. EIC, right inframammary crease  - Ultrasound consistent 6/22/21, inflamed 12/201 then resolved, consider excision if recurrent inflammation  5. 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 She has two children ([place] in high school, Jany in college).    Family history: Father had unknown types of skin cancer. Patient and her father are no longer in contact for her to ask. No family history of pancreatic cancer.   ____________________________________________    Assessment & Plan:  # History of melanoma on the L back, no clinical evidence of recurrence.   - ABCDEs: Counseled ABCDEs of melanoma: Asymmetry, Border (irregularity), Color (not uniform, changes in color), Diameter (greater than 6 mm which is about the size of a pencil eraser), and Evolving (any changes in preexisting moles).  - Sun protection:  Counseled SPF30+ sunscreen, UPF clothing, sun avoidance, tanning bed avoidance.   - Recommended yearly dental, ophthalmology, and gynecology exams.  - Recommended skin exams for all first-degree relatives.  - Recommended follow up is 6 months    # Follicuitis +/- inflammatory cyst - L axilla/groin - cyst of L axilla, not inflamed today, occasionally cystic type lesions in the groin - lesion in axilla not flaring today  - consider early HS vs folliculitis as she notes flaring of the groin with certain pants that rub  - start Hibiclens soap 1-2 weekly for prevention  - future: ILK to axilla if flared vs excision. If needs excision would send to gen surg    # Hx of DNs  - continue annual or bi annual skin exams, edu on increased risk for melanoma  - Signs and Symptoms of non-melanoma skin cancer and ABCDEs of melanoma reviewed with patient. Patient encouraged to perform monthly self skin exams and educated on how to perform them. UV precautions reviewed with patient. Patient was asked about new or changing moles/lesions on body.   - Sunscreen: Apply 20 minutes prior to going outdoors and reapply every two hours, when wet or sweating. We recommend using an SPF 30 or higher, and to use one that is water resistant.       # Benign findings: multiple benign nevi, lentigines, cherry angiomas, SKs  - edu on benign etiology  - Signs and Symptoms of non-melanoma skin cancer and ABCDEs of melanoma reviewed with patient. Patient encouraged to perform monthly self skin exams and educated on how to perform them. UV precautions reviewed with patient. Patient was asked about new or changing moles/lesions on body.   - Sunscreen: Apply 20 minutes prior to going outdoors and reapply every two hours, when wet or sweating. We recommend using an SPF 30 or higher, and to use one that is water resistant.     - RTC for changes    Procedures Performed:   none    Follow-up: 6 month(s) in-person, or earlier for new or changing lesions    Staff:      All risks, benefits and alternatives were discussed with patient.  Patient is in agreement and understands the assessment and plan.  All questions were answered.    Shea Marsh PA-C, MPAS  [facility]: Phone: [phone], Fax: [phone]  [facility]: Phone: [phone],  Fax: [phone]  [facility]: Phone: [phone], Fax: [phone]  ____________________________________________    CC: Skin Check (FBSE, c/o spot in left axilla)      HPI:  Ms. Nancy Bronson is a 53 year old female who presents today as a return patient for FBSE. Last seen by myself for excision of a DN on the shin. Hx melanoma. Notes lesion in the L underarm which drains occasionally both blood and what appears to be pus. Occasionally gets raised bumps in groin but this seems to be more mediated by the pants she is wearing/friction The lesion on the L arm pit never fully resolves. Not tender or inflamed today.    Patient is otherwise feeling well, without additional concerns.    Labs:  none    Physical Exam:  Vitals: LMP 12/02/2012   SKIN: Full skin, which includes the head/face, both arms, chest, back, abdomen,both legs, genitalia and/or groin buttocks, digits and/or nails, was examined.   - Archuleta's skin type I-II, >100 nevi  - There are dome shaped bright red papules on the trunk.   - Multiple regular brown pigmented macules and papules are identified on the trunk and extremities.   - Scattered brown macules on sun exposed areas.  - There are waxy stuck on tan to brown papules on the trunk.   - There is no erythema, telangectasias, nodularity, or pigmentation on the L back.   - L axilla with palpable cystic type lesion, no drainage or opening in skin, no tunneling   - brown macules in L inguinal fold consistent with PIH. No papules or pustules.   - No other lesions of concern on areas examined.   LYMPH:  Examination of the pre/post auricular, occipital, cervical, clavicular, axillary and inguinal lymph nodes was negative.    Medications:  No current outpatient medications on file.     No current facility-administered medications for this visit.      Past Medical/Surgical History:   Patient Active Problem List   Diagnosis    Snoring    S/P hysterectomy    Anemia    Malignant melanoma of torso excluding breast (H)     Past Medical History:   Diagnosis Date    Anemia     Malignant melanoma (H)     NO ACTIVE PROBLEMS                   '

## 2023-09-14 NOTE — LETTER
9/14/2023         RE: Nancy Bronson  [address] [facility] [address]        Dear Colleague,    Thank you for referring your patient, Nancy Bronson, to the [facility]. Please see a copy of my visit note below.    [facility] Dermatology Note  Encounter Date: Sep 14, 2023  Office Visit      Dermatology Problem List:  Total Body Photography completed 3/26/21  Skin checks q6 months through 7/2023  1. History of melanoma, left back, Stage IIB (cT3b, cN0, cM0)   -Initial biopsy on 6/2019: BD at least 3.5 with ulceration and 10 mitoses/mm2  - s/p WLE with keystone flap and negative left axillary sentinel lymph node biopsy (7/18/2019) with Gwyn Raman and Tate   2. History of dysplastic nevi  - Severely dysplastic nevus - R shin - s/p excision 5/4/23  - Moderately atypical nevi x2 of the medial left upper back and mid upper back, s/p biopsy 9/10/19 with pigment recurrence 12/2019, s/p excision 3/4/20  - Nevus with congenital features and junctional atypia, right upper back, s/p biopsy 3/4/20, pigment recurrence noted 6/12/20, re-excised 7/23/20  3. DF + EIC, left lateral chest, s/p excision 4/6/21  4. EIC, right inframammary crease  - Ultrasound consistent 6/22/21, inflamed 12/201 then resolved, consider excision if recurrent inflammation  5. 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 She has two children ([place] in high school, Jany in college).    Family history: Father had unknown types of skin cancer. Patient and her father are no longer in contact for her to ask. No family history of pancreatic cancer.   ____________________________________________    Assessment & Plan:  # History of melanoma on the L back, no clinical evidence of recurrence.   -  ABCDEs: Counseled ABCDEs of melanoma: Asymmetry, Border (irregularity), Color (not uniform, changes in color), Diameter (greater than 6 mm which is about the size of a pencil eraser), and Evolving (any changes in preexisting moles).  - Sun protection: Counseled SPF30+ sunscreen, UPF clothing, sun avoidance, tanning bed avoidance.   - Recommended yearly dental, ophthalmology, and gynecology exams.  - Recommended skin exams for all first-degree relatives.  - Recommended follow up is 6 months    # Follicuitis +/- inflammatory cyst - L axilla/groin - cyst of L axilla, not inflamed today, occasionally cystic type lesions in the groin - lesion in axilla not flaring today  - consider early HS vs folliculitis as she notes flaring of the groin with certain pants that rub  - start Hibiclens soap 1-2 weekly for prevention  - future: ILK to axilla if flared vs excision. If needs excision would send to gen surg    # Hx of DNs  - continue annual or bi annual skin exams, edu on increased risk for melanoma  - Signs and Symptoms of non-melanoma skin cancer and ABCDEs of melanoma reviewed with patient. Patient encouraged to perform monthly self skin exams and educated on how to perform them. UV precautions reviewed with patient. Patient was asked about new or changing moles/lesions on body.   - Sunscreen: Apply 20 minutes prior to going outdoors and reapply every two hours, when wet or sweating. We recommend using an SPF 30 or higher, and to use one that is water resistant.       # Benign findings: multiple benign nevi, lentigines, cherry angiomas, SKs  - edu on benign etiology  - Signs and Symptoms of non-melanoma skin cancer and ABCDEs of melanoma reviewed with patient. Patient encouraged to perform monthly self skin exams and educated on how to perform them. UV precautions reviewed with patient. Patient was asked about new or changing moles/lesions on body.   - Sunscreen: Apply 20 minutes prior to going outdoors and reapply every  two hours, when wet or sweating. We recommend using an SPF 30 or higher, and to use one that is water resistant.     - RTC for changes    Procedures Performed:   none    Follow-up: 6 month(s) in-person, or earlier for new or changing lesions    Staff:     All risks, benefits and alternatives were discussed with patient.  Patient is in agreement and understands the assessment and plan.  All questions were answered.    Shea Marsh PA-C, MPAS  [facility] [place]: Phone: [phone], Fax: [phone]  [facility]: Phone: [phone],  Fax: [phone]  [facility]: Phone: [phone], Fax: [phone]  ____________________________________________    CC: Skin Check (FBSE, c/o spot in left axilla)      HPI:  Ms. Nancy Bronson is a 53 year old female who presents today as a return patient for FBSE. Last seen by myself for excision of a DN on the shin. Hx melanoma. Notes lesion in the L underarm which drains occasionally both blood and what appears to be pus. Occasionally gets raised bumps in groin but this seems to be more mediated by the pants she is wearing/friction The lesion on the L arm pit never fully resolves. Not tender or inflamed today.    Patient is otherwise feeling well, without additional concerns.    Labs:  none    Physical Exam:  Vitals: LMP 12/02/2012   SKIN: Full skin, which includes the head/face, both arms, chest, back, abdomen,both legs, genitalia and/or groin buttocks, digits and/or nails, was examined.   - Archuleta's skin type I-II, >100 nevi  - There are dome shaped bright red papules on the trunk.   - Multiple regular brown pigmented macules and papules are identified on the trunk and extremities.   - Scattered brown macules on sun exposed areas.  - There are waxy stuck on tan to brown papules on the trunk.   - There is no erythema, telangectasias, nodularity, or pigmentation on the  L back.   - L axilla with palpable cystic type lesion, no drainage or opening in skin, no tunneling   - brown macules in L inguinal fold consistent with PIH. No papules or pustules.   - No other lesions of concern on areas examined.   LYMPH: Examination of the pre/post auricular, occipital, cervical, clavicular, axillary and inguinal lymph nodes was negative.    Medications:  No current outpatient medications on file.     No current facility-administered medications for this visit.      Past Medical/Surgical History:   Patient Active Problem List   Diagnosis     Snoring     S/P hysterectomy     Anemia     Malignant melanoma of torso excluding breast (H)     Past Medical History:   Diagnosis Date     Anemia      Malignant melanoma (H)      NO ACTIVE PROBLEMS                   '      Again, thank you for allowing me to participate in the care of your patient.        Sincerely,        Shea Marsh PA-C

## 2023-12-22 ENCOUNTER — MYC MEDICAL ADVICE (OUTPATIENT)
Dept: FAMILY MEDICINE | Facility: CLINIC | Age: 53
End: 2023-12-22
Payer: COMMERCIAL

## 2023-12-22 NOTE — TELEPHONE ENCOUNTER
Patient Quality Outreach    Patient is due for the following:   Breast Cancer Screening - Mammogram    Next Steps:   No follow up needed at this time.    Type of outreach:    Sent [employer] message.    Next Steps:  Reach out within 90 days via [employer].    Max number of attempts reached: No. Will try again in 90 days if patient still on fail list.    Questions for provider review:    None           Julienne Mclean CMA  Chart routed to Care Team.

## 2024-01-02 NOTE — PROGRESS NOTES
.FOLLOW UP  Eric 3, 2024     Nancy Bronson is a 53 year old woman who presents with with history of melanoma.      HPI:     She underwent a wide local excision of left back melanoma and left axillary sentinel lymph node biopsy with Dr. Raman and keystone flap with Dr. Ybarra 7/18/19 (Stage IIB T3bN0).     Since her last visit, she has been doing well. She denies any headaches, dizziness, vision changes, abdominal pain, bowel habit changes, rectal bleeding, melena, chest pain, shortness of breath, or unintentional weight loss.  She has not noted any masses in either axilla. She has good range of motion of her shoulders and denies any upper extremity swelling. She denies any new skin lesions or nodules.     She reports recent dark wax with some discomfort from her left ear. No fever. She has a history of recurrent ear infection and hearing loss. She denies any hearing changes.      Her last skin check was performed by Shea Marsh 9/14//23. 6 month follow up was recommended.     PAST MEDICAL HISTORY  Past Medical History:   Diagnosis Date    Anemia     Malignant melanoma (H)     NO ACTIVE PROBLEMS      PAST SURGICAL HISTORY   Past Surgical History:   Procedure Laterality Date    ABDOMEN SURGERY  2012    hysterectomy    BIOPSY NODE SENTINEL Left 7/18/2019    Procedure: [place] Lymph Node Mapping and Biopsy;  Surgeon: Susan Raman MD;  Location:  OR    COLONOSCOPY N/A 6/28/2021    Procedure: COLONOSCOPY (fv) - would like prep emailed;  Surgeon: Cris Avelar MD;  Location:  GI    DAVINCI HYSTERECTOMY SUPRACERVICAL  12/4/2012    benign pathProcedure: DAVINCI HYSTERECTOMY SUPRACERVICAL;  Davinci Supracervical Hysterectomy with Bilateral Salpingectomy, Cystoscopy ;  Surgeon: Selene Cardenas DO;  Location:  OR    ENT SURGERY  1975    EXCISE LESION BACK N/A 7/18/2019    Procedure: Wide Local Excision of Back Melanoma;  Surgeon: Susan Raman MD;  Location:  OR    HEAD & NECK SURGERY  1986     jaw surgery    LAPAROSCOPIC TUBAL LIGATION  2007    PROCEDURE PLACEHOLDER PLASTIC N/A 7/18/2019    Procedure: Back Reconstruction With Local Tissue Rearrangement;  Surgeon: DIPAK Ybarra MD;  Location:  OR    [facility] C-SEC ONLY,PREV C-SEC  2001,2004    2    [facility] NONSPECIFIC PROCEDURE  1975    tonsils    [facility] NONSPECIFIC PROCEDURE  1985    jaw surgery     MEDICATIONS  Current Outpatient Medications   Medication Sig Dispense Refill    chlorhexidine (HIBICLENS) 4 % liquid Use as body wash to underarms and groin 1-2x weekly, more frequently with flares 236 mL 3      ALLERGIES   No Known Allergies     SOCIAL HISTORY:  Smokes: No  EtOH: No  Wearing sunscreen and limiting sun exposure.     ROS:  Change in vision No  Headaches: no  Respiratory: No shortness of breath, dyspnea on exertion, cough, or hemoptysis   Cardiovascular: negative   Gastrointestinal: negative Abdominal pain: no  Musculoskeletal: negative Joint pain No Back pain: no  Psychiatric: negative  Hematologic/Lymphatic/Immunologic: negative  Endocrine: negative    EXAM  /84 (BP Location: Right arm, Patient Position: Sitting, Cuff Size: Adult Regular)   Pulse 94   Temp 98.4  F (36.9  C) (Oral)   Resp 12   Wt 99.7 kg (219 lb 12.8 oz)   LMP 12/02/2012   SpO2 98%   BMI 34.91 kg/m     PHYSICAL EXAM  Respiratory: breathing non labored.   Ear: right ear canal and ear drum normal. Left ear drum with bulging, no erythema. There is dark earwax in the ear canal. No erythema.   Skin: Numerous pigment skin lesions. Back wide local excision site unremarkable. Centrally on the back there is an additional scar from dysplastic nevus resection. No nodularity. Left axillary scar well healed with dark scar secondary to hidradenitis.  No clavicular, cervical, axillary or inguinal lymphadenopathy.   Abdomen is soft, nontender nondistended. No masses or hepatomegaly.     ASSESSMENT/PLAN:    Nancy Bronson is a 53 year old with history of back melanoma, 4.5 years  s/p wide local excision.      No concerning findings on exam. We reviewed that surveillance involves a history and physical exam every 6 months for the first 5 years, then annually, with imaging studies only indicated if symptoms arise.   We reviewed the signs and symptoms that could arise in the setting of recurrent locoregional or metastatic disease. In addition, she will need to undergo regular dermatologic full skin survey and evaluation given that patients with a diagnosis of melanoma are at risk of recurrence (local and distant) and of subsequent de sherrill melanoma.  I also reviewed the importance of protection from sun exposure, including wearing long sleeves, hats, etc and also the use of sunscreen with SPF of at least 35, with frequent re-applications.      PLAN:  Follow up with ENT.   Follow up with Dermatology  Follow up in 6 months: 7/2024          Bekah Crawford PA-C    20 minutes spent on the date of the encounter doing chart review, review of test results, interpretation of tests, patient visit and documentation.

## 2024-01-03 ENCOUNTER — ONCOLOGY VISIT (OUTPATIENT)
Dept: SURGERY | Facility: CLINIC | Age: 54
End: 2024-01-03
Attending: PHYSICIAN ASSISTANT
Payer: COMMERCIAL

## 2024-01-03 VITALS
SYSTOLIC BLOOD PRESSURE: 118 MMHG | OXYGEN SATURATION: 98 % | WEIGHT: 219.8 LBS | RESPIRATION RATE: 12 BRPM | TEMPERATURE: 98.4 F | BODY MASS INDEX: 34.91 KG/M2 | HEART RATE: 94 BPM | DIASTOLIC BLOOD PRESSURE: 84 MMHG

## 2024-01-03 DIAGNOSIS — Z85.820 HISTORY OF MELANOMA: Primary | ICD-10-CM

## 2024-01-03 PROCEDURE — 99213 OFFICE O/P EST LOW 20 MIN: CPT | Performed by: PHYSICIAN ASSISTANT

## 2024-01-03 ASSESSMENT — PAIN SCALES - GENERAL: PAINLEVEL: NO PAIN (0)

## 2024-01-03 NOTE — NURSING NOTE
"Oncology Rooming Note    January 3, 2024 9:00 AM   Nancy Bronson is a 53 year old female who presents for:    Chief Complaint   Patient presents with    Oncology Clinic Visit     Initial Vitals: /84 (BP Location: Right arm, Patient Position: Sitting, Cuff Size: Adult Regular)   Pulse 94   Temp 98.4  F (36.9  C) (Oral)   Resp 12   Wt 99.7 kg (219 lb 12.8 oz)   LMP 12/02/2012   SpO2 98%   BMI 34.91 kg/m   Estimated body mass index is 34.91 kg/m  as calculated from the following:    Height as of 11/16/22: 1.69 m (5' 6.54\").    Weight as of this encounter: 99.7 kg (219 lb 12.8 oz). Body surface area is 2.16 meters squared.  No Pain (0) Comment: Data Unavailable   Patient's last menstrual period was 12/02/2012.  Allergies reviewed: Yes  Medications reviewed: Yes    Medications: Medication refills not needed today.  Pharmacy name entered into [facility]:    [facility] [place], [place] - [address]  [id number] [place], [place] - [facility] [address] AT [facility] [address]    Frailty Screening:   Is the patient here for a new oncology consult visit in cancer care? 2. No      Clinical concerns: none.       Cheng Montaño              "

## 2024-01-03 NOTE — LETTER
1/3/2024         RE: Nancy Bronson  [address] [facility] [address]        Dear Colleague,    Thank you for referring your patient, Nancy Bronson, to the [facility]. Please see a copy of my visit note below.    .FOLLOW UP  Eric 3, 2024     Nancy Bronson is a 53 year old woman who presents with with history of melanoma.      HPI:     She underwent a wide local excision of left back melanoma and left axillary sentinel lymph node biopsy with Dr. Raman and keystone flap with Dr. Ybarra 7/18/19 (Stage IIB T3bN0).     Since her last visit, she has been doing well. She denies any headaches, dizziness, vision changes, abdominal pain, bowel habit changes, rectal bleeding, melena, chest pain, shortness of breath, or unintentional weight loss.  She has not noted any masses in either axilla. She has good range of motion of her shoulders and denies any upper extremity swelling. She denies any new skin lesions or nodules.     She reports recent dark wax with some discomfort from her left ear. No fever. She has a history of recurrent ear infection and hearing loss. She denies any hearing changes.      Her last skin check was performed by Shea Marsh 9/14//23. 6 month follow up was recommended.     PAST MEDICAL HISTORY  Past Medical History:   Diagnosis Date    Anemia     Malignant melanoma (H)     NO ACTIVE PROBLEMS      PAST SURGICAL HISTORY   Past Surgical History:   Procedure Laterality Date    ABDOMEN SURGERY  2012    hysterectomy    BIOPSY NODE SENTINEL Left 7/18/2019    Procedure: [place] Lymph Node Mapping and Biopsy;  Surgeon: Susan Raman MD;  [facility]    COLONOSCOPY N/A 6/28/2021    Procedure: COLONOSCOPY (fv) - would like prep emailed;  Surgeon: Cris Avelar MD;  Location:  GI    DAVINCI HYSTERECTOMY SUPRACERVICAL  12/4/2012    benign pathProcedure: DAVINCI HYSTERECTOMY SUPRACERVICAL;  Davinci Supracervical Hysterectomy with Bilateral Salpingectomy,  Cystoscopy ;  Surgeon: Selene Cardenas DO;  Location:  OR    ENT SURGERY  1975    EXCISE LESION BACK N/A 7/18/2019    Procedure: Wide Local Excision of Back Melanoma;  Surgeon: Susan Raman MD;  Location:  OR    HEAD & NECK SURGERY  1986    jaw surgery    LAPAROSCOPIC TUBAL LIGATION  2007    PROCEDURE PLACEHOLDER PLASTIC N/A 7/18/2019    Procedure: Back Reconstruction With Local Tissue Rearrangement;  Surgeon: DIPAK Ybarra MD;  Location:  OR    [facility] C-SEC ONLY,PREV C-SEC  2001,2004    2    [facility] NONSPECIFIC PROCEDURE  1975    tonsils    [facility] NONSPECIFIC PROCEDURE  1985    jaw surgery     MEDICATIONS  Current Outpatient Medications   Medication Sig Dispense Refill    chlorhexidine (HIBICLENS) 4 % liquid Use as body wash to underarms and groin 1-2x weekly, more frequently with flares 236 mL 3      ALLERGIES   No Known Allergies     SOCIAL HISTORY:  Smokes: No  EtOH: No  Wearing sunscreen and limiting sun exposure.     ROS:  Change in vision No  Headaches: no  Respiratory: No shortness of breath, dyspnea on exertion, cough, or hemoptysis   Cardiovascular: negative   Gastrointestinal: negative Abdominal pain: no  Musculoskeletal: negative Joint pain No Back pain: no  Psychiatric: negative  Hematologic/Lymphatic/Immunologic: negative  Endocrine: negative    EXAM  /84 (BP Location: Right arm, Patient Position: Sitting, Cuff Size: Adult Regular)   Pulse 94   Temp 98.4  F (36.9  C) (Oral)   Resp 12   Wt 99.7 kg (219 lb 12.8 oz)   LMP 12/02/2012   SpO2 98%   BMI 34.91 kg/m     PHYSICAL EXAM  Respiratory: breathing non labored.   Ear: right ear canal and ear drum normal. Left ear drum with bulging, no erythema. There is dark earwax in the ear canal. No erythema.   Skin: Numerous pigment skin lesions. Back wide local excision site unremarkable. Centrally on the back there is an additional scar from dysplastic nevus resection. No nodularity. Left axillary scar well healed.  No clavicular,  cervical, axillary or inguinal lymphadenopathy.   Abdomen is soft, nontender nondistended. No masses or hepatomegaly.     ASSESSMENT/PLAN:    Nancy Bronson is a 53 year old with history of back melanoma, 4.5 years s/p wide local excision.      No concerning findings on exam. We reviewed that surveillance involves a history and physical exam every 6 months for the first 5 years, then annually, with imaging studies only indicated if symptoms arise.   We reviewed the signs and symptoms that could arise in the setting of recurrent locoregional or metastatic disease. In addition, she will need to undergo regular dermatologic full skin survey and evaluation given that patients with a diagnosis of melanoma are at risk of recurrence (local and distant) and of subsequent de sherrill melanoma.  I also reviewed the importance of protection from sun exposure, including wearing long sleeves, hats, etc and also the use of sunscreen with SPF of at least 35, with frequent re-applications.      PLAN:  Follow up with ENT.   Follow up with Dermatology  Follow up in 6 months: 7/2024        Bekah Crawford PA-C    20 minutes spent on the date of the encounter doing chart review, review of test results, interpretation of tests, patient visit and documentation.

## 2024-01-03 NOTE — PATIENT INSTRUCTIONS
Nancy Bronson is a 53 year old with history of back melanoma, 4.5 years s/p wide local excision.      No concerning findings on exam. We reviewed that surveillance involves a history and physical exam every 6 months for the first 5 years, then annually, with imaging studies only indicated if symptoms arise.   We reviewed the signs and symptoms that could arise in the setting of recurrent locoregional or metastatic disease. In addition, she will need to undergo regular dermatologic full skin survey and evaluation given that patients with a diagnosis of melanoma are at risk of recurrence (local and distant) and of subsequent de sherrill melanoma.  I also reviewed the importance of protection from sun exposure, including wearing long sleeves, hats, etc and also the use of sunscreen with SPF of at least 35, with frequent re-applications.      PLAN:  Follow up with Dermatology  Follow up in 6 months: 7/2024

## 2024-01-28 ENCOUNTER — HEALTH MAINTENANCE LETTER (OUTPATIENT)
Age: 54
End: 2024-01-28

## 2024-03-14 ENCOUNTER — OFFICE VISIT (OUTPATIENT)
Dept: FAMILY MEDICINE | Facility: CLINIC | Age: 54
End: 2024-03-14
Payer: COMMERCIAL

## 2024-03-14 DIAGNOSIS — L81.4 LENTIGINES: ICD-10-CM

## 2024-03-14 DIAGNOSIS — D18.01 CHERRY ANGIOMA: ICD-10-CM

## 2024-03-14 DIAGNOSIS — D49.2 NEOPLASM OF SKIN: ICD-10-CM

## 2024-03-14 DIAGNOSIS — Z85.820 HISTORY OF MELANOMA: ICD-10-CM

## 2024-03-14 DIAGNOSIS — Z87.898 HISTORY OF ATYPICAL NEVUS: ICD-10-CM

## 2024-03-14 DIAGNOSIS — L82.1 SEBORRHEIC KERATOSES: ICD-10-CM

## 2024-03-14 DIAGNOSIS — L73.9 FOLLICULITIS: ICD-10-CM

## 2024-03-14 DIAGNOSIS — D22.9 MULTIPLE BENIGN NEVI: Primary | ICD-10-CM

## 2024-03-14 PROCEDURE — 88305 TISSUE EXAM BY PATHOLOGIST: CPT | Performed by: DERMATOLOGY

## 2024-03-14 PROCEDURE — 11102 TANGNTL BX SKIN SINGLE LES: CPT | Performed by: PHYSICIAN ASSISTANT

## 2024-03-14 PROCEDURE — 99213 OFFICE O/P EST LOW 20 MIN: CPT | Mod: 25 | Performed by: PHYSICIAN ASSISTANT

## 2024-03-14 ASSESSMENT — PAIN SCALES - GENERAL: PAINLEVEL: NO PAIN (0)

## 2024-03-14 NOTE — LETTER
3/14/2024         RE: Nancy Bronson  [address] [facility] [address]        Dear Colleague,    Thank you for referring your patient, Nancy Bronson, to the [facility]. Please see a copy of my visit note below.    [facility] Dermatology Note  Encounter Date: Mar 14, 2024  Office Visit      Dermatology Problem List:  FBSE: 3/14/24    #NUB, S/p Biopsy performed on 3/14/24  Total Body Photography completed 3/26/21  1. History of melanoma, left back, Stage IIB (cT3b, cN0, cM0)   -Initial biopsy on 6/2019: BD at least 3.5 with ulceration and 10 mitoses/mm2  - s/p WLE with keystone flap and negative left axillary sentinel lymph node biopsy (7/18/2019) with Gwyn Raman and Tate   2. History of dysplastic nevi  - Severely dysplastic nevus - R shin - s/p excision 5/4/23  - Moderately atypical nevi x2 of the medial left upper back and mid upper back, s/p biopsy 9/10/19 with pigment recurrence 12/2019, s/p excision 3/4/20  - Nevus with congenital features and junctional atypia, right upper back, s/p biopsy 3/4/20, pigment recurrence noted 6/12/20, re-excised 7/23/20  3. DF + EIC, left lateral chest, s/p excision 4/6/21  4. EIC, right inframammary crease  - Ultrasound consistent 6/22/21, inflamed 12/201 then resolved, consider excision if recurrent inflammation  5. 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 She has two children ([place] in high school, Jany in college).    Family history: Father had unknown types of skin cancer. Patient and her father are no longer in contact for her to ask. No family history of pancreatic cancer.  ____________________________________________    Assessment & Plan:  # Folliculitis  - Stable, continue use of Hibiclens as needed.      # Neoplasm of unspecified behavior of the skin (D49.2) on the R lower back. The differential diagnosis includes DN vs MM vs other. .   - Shave biopsy performed today, see procedure note below.   - Photographed today     # HX of Melanoma  - ABCDEs: Counseled ABCDEs of melanoma: Asymmetry, Border (irregularity), Color (not uniform, changes in color), Diameter (greater than 6 mm which is about the size of a pencil eraser), and Evolving (any changes in preexisting moles).  - Sun protection: Counseled SPF30+ sunscreen, UPF clothing, sun avoidance, tanning bed avoidance.   - Recommended yearly dental, ophthalmology, and gynecology exams.  - Recommended skin exams for all first-degree relatives.  - Recommended follow up is 6 months    #Hx of DN  - Sunscreen: Apply 20 minutes prior to going outdoors and reapply every two hours, when wet or sweating. We recommend using an SPF 30 or higher, and to use one that is water resistant.     - Advised to monitor for changing, non-healing, bleeding, painful, changing, or otherwise symptomatic lesions  - Continue annual skin exams    # Benign findings: multiple benign nevi, lentigines, cherry angiomas, SKs  - edu on benign etiology  - Signs and Symptoms of non-melanoma skin cancer and ABCDEs of melanoma reviewed with patient. Patient encouraged to perform monthly self skin exams and educated on how to perform them. UV precautions reviewed with patient. Patient was asked about new or changing moles/lesions on body.   - Sunscreen: Apply 20 minutes prior to going outdoors and reapply every two hours, when wet or sweating. We recommend using an SPF 30 or higher, and to use one that is water resistant.     - RTC for changes      Procedures Performed:   - Shave biopsy procedure note, location(s): see above. After discussion of benefits and risks including but not limited to bleeding, infection, scar, incomplete removal, recurrence, and non-diagnostic biopsy, written consent and  photographs were obtained. The area was cleaned with isopropyl alcohol. 0.5mL of 1% lidocaine with epinephrine was injected to obtain adequate anesthesia of lesion(s). Shave biopsy at site(s) performed. Hemostasis was achieved with aluminium chloride. Petrolatum ointment and a sterile dressing were applied. The patient tolerated the procedure and no complications were noted. The patient was provided with verbal and written post care instructions.      Follow-up: pending path results    Staff and scribe:    Carole PEREZ, am serving as a scribe to document services personally performed by Shea Marsh PA-C based on data collection and the provider's statements to me.    All risks, benefits and alternatives were discussed with patient.  Patient is in agreement and understands the assessment and plan.  All questions were answered.    Shea Marsh PA-C, [facility]  [facility]: Phone: [phone], Fax: [phone]  [facility]: Phone: [phone],  Fax: [phone]  [facility]: Phone: [phone], Fax: [phone]  ____________________________________________    CC: RECHECK (6 month recheck Malignant melanoma)      Reviewed patients past medical history and pertinent chart review prior to patient's visit today.     HPI:  Ms. Nancy Bronson is a 53 year old female who presents today as a return patient for [facility].     Today patient reported a spot of concern on her back. Patient noted some change in appearance. She continues to use Hibiclens on her groin and under her arms.     Has a hx of MM.    Patient is otherwise feeling well, without additional concerns.    Labs:  N/a    Physical Exam:  Vitals: [facility] 12/02/2012   SKIN: Full skin, which includes the head/face, both arms, chest, back, abdomen,both legs, genitalia and/or groin buttocks, digits and/or nails, was examined.   - Archuleta's skin type II,  has <100 nevi  - There are dome shaped bright red papules on the trunk.   - Multiple regular brown pigmented macules and papules are identified on the trunk and extremities.   - Scattered brown macules on sun exposed areas.  - There are waxy stuck on tan to brown papules on the trunk.    -There are well healed surgical scars without erythema, nodularity or telangiectasias on the prior MM site. NERD  LYMPH: Examination of the pre/post auricular, occipital, cervical, clavicular, axillary and inguinal lymph nodes was negative.  - R lower back there is a 5 mm brown macule with speckling in the periphery.   - No other lesions of concern on areas examined.               Medications:  Current Outpatient Medications   Medication     chlorhexidine (HIBICLENS) 4 % liquid     No current facility-administered medications for this visit.      Past Medical/Surgical History:   Patient Active Problem List   Diagnosis     Snoring     S/P hysterectomy     Anemia     Malignant melanoma of torso excluding breast (H)     Past Medical History:   Diagnosis Date     Anemia      Malignant melanoma (H)      NO ACTIVE PROBLEMS                         Again, thank you for allowing me to participate in the care of your patient.        Sincerely,        Shea Marsh PA-C

## 2024-03-14 NOTE — PATIENT INSTRUCTIONS
Patient Education       Proper skin care from [facility]:    -Eliminate harsh soaps as they strip the natural oils from the skin, often resulting in dry itchy skin ( i.e. Dial, Zest, [place] Spring)  -Use mild soaps such as Cetaphil or Dove Sensitive Skin in the shower. You do not need to use soap on arms, legs, and trunk every time you shower unless visibly soiled.   -Avoid hot or cold showers.  -After showering, lightly dry off and apply moisturizing within 2-3 minutes. This will help trap moisture in the skin.   -Aggressive use of a moisturizer at least 1-2 times a day to the entire body (including -Vanicream, Cetaphil, Aquaphor or Cerave) and moisturize hands after every washing.  -We recommend using moisturizers that come in a tub that needs to be scooped out, not a pump. This has more of an oil base. It will hold moisture in your skin much better than a water base moisturizer. The above recommended are non-pore clogging.      Wear a sunscreen with at least SPF 30 on your face, ears, neck and V of the chest daily. Wear sunscreen on other areas of the body if those areas are exposed to the sun throughout the day. Sunscreens can contain physical and/or chemical blockers. Physical blockers are less likely to clog pores, these include zinc oxide and titanium dioxide. Reapply every two hour and after swimming.     Sunscreen examples: https://www.ewg.org/sunscreen/    UV radiation  UVA radiation remains constant throughout the day and throughout the year. It is a longer wavelength than UVB and therefore penetrates deeper into the skin leading to immediate and delayed tanning, photoaging, and skin cancer. 70-80% of UVA and UVB radiation occurs between the hours of 10am-2pm.  UVB radiation  UVB radiation causes the most harmful effects and is more significant during the summer months. However, snow and ice can reflect UVB radiation leading to skin damage during the winter months as well. UVB radiation is  responsible for tanning, burning, inflammation, delayed erythema (pinkness), pigmentation (brown spots), and skin cancer.     I recommend self monthly full body exams and yearly full body exams with a dermatology provider. If you develop a new or changing lesion please follow up for examination. Most skin cancers are pink and scaly or pink and pearly. However, we do see blue/brown/black skin cancers.  Consider the ABCDEs of melanoma when giving yourself your monthly full body exam ( don't forget the groin, buttocks, feet, toes, etc). A-asymmetry, B-borders, C-color, D-diameter, E-elevation or evolving. If you see any of these changes please follow up in clinic. If you cannot see your back I recommend purchasing a hand held mirror to use with a larger wall mirror.       Checking for Skin Cancer  You can find cancer early by checking your skin each month. There are 3 kinds of skin cancer. They are melanoma, basal cell carcinoma, and squamous cell carcinoma. Doing monthly skin checks is the best way to find new marks or skin changes. Follow the instructions below for checking your skin.   The ABCDEs of checking moles for melanoma   Check your moles or growths for signs of melanoma using ABCDE:   Asymmetry: the sides of the mole or growth don t match  Border: the edges are ragged, notched, or blurred  Color: the color within the mole or growth varies  Diameter: the mole or growth is larger than 6 mm (size of a pencil eraser)  Evolving: the size, shape, or color of the mole or growth is changing (evolving is not shown in the images below)    Checking for other types of skin cancer  Basal cell carcinoma or squamous cell carcinoma have symptoms such as:     A spot or mole that looks different from all other marks on your skin  Changes in how an area feels, such as itching, tenderness, or pain  Changes in the skin's surface, such as oozing, bleeding, or scaliness  A sore that does not heal  New swelling or redness beyond  the border of a mole    Who s at risk?  Anyone can get skin cancer. But you are at greater risk if you have:   Fair skin, light-colored hair, or light-colored eyes  Many moles or abnormal moles on your skin  A history of sunburns from sunlight or tanning beds  A family history of skin cancer  A history of exposure to radiation or chemicals  A weakened immune system  If you have had skin cancer in the past, you are at risk for recurring skin cancer.   How to check your skin  Do your monthly skin checkups in front of a full-length mirror. Check all parts of your body, including your:   Head (ears, face, neck, and scalp)  Torso (front, back, and sides)  Arms (tops, undersides, upper, and lower armpits)  Hands (palms, backs, and fingers, including under the nails)  Buttocks and genitals  Legs (front, back, and sides)  Feet (tops, soles, toes, including under the nails, and between toes)  If you have a lot of moles, take digital photos of them each month. Make sure to take photos both up close and from a distance. These can help you see if any moles change over time.   Most skin changes are not cancer. But if you see any changes in your skin, call your doctor right away. Only he or she can diagnose a problem. If you have skin cancer, seeing your doctor can be the first step toward getting the treatment that could save your life.   [employer] last reviewed this educational content on 4/1/2019 2000-2020 [employer]. [address] [place] [address], [place] [address]. All rights reserved. This information is not intended as a substitute for professional medical care. Always follow your healthcare professional's instructions.       When should I call my doctor?  If you are worsening or not improving, please, contact us or seek urgent care as noted below.     Who should I call with questions (adults)?  [facility] (adult and pediatric): [phone]  [facility]  [place] (adult): [phone]  [facility] ([place], [place], [place] and [place]) [phone]  For urgent needs outside of business hours call the [facility] at [phone] and ask for the dermatology resident on call to be paged  If this is a medical emergency and you are unable to reach an ER, Call [phone]      If you need a prescription refill, please contact your pharmacy. Refills are approved or denied by our Physicians during normal business hours, Monday through Fridays  Per office policy, refills will not be granted if you have not been seen within the past year (or sooner depending on your child's condition) Patient Education       Proper skin care from [facility]:    -Eliminate harsh soaps as they strip the natural oils from the skin, often resulting in dry itchy skin ( i.e. Dial, Zest, [place])  -Use mild soaps such as Cetaphil or Dove Sensitive Skin in the shower. You do not need to use soap on arms, legs, and trunk every time you shower unless visibly soiled.   -Avoid hot or cold showers.  -After showering, lightly dry off and apply moisturizing within 2-3 minutes. This will help trap moisture in the skin.   -Aggressive use of a moisturizer at least 1-2 times a day to the entire body (including -Vanicream, Cetaphil, Aquaphor or Cerave) and moisturize hands after every washing.  -We recommend using moisturizers that come in a tub that needs to be scooped out, not a pump. This has more of an oil base. It will hold moisture in your skin much better than a water base moisturizer. The above recommended are non-pore clogging.      Wear a sunscreen with at least SPF 30 on your face, ears, neck and V of the chest daily. Wear sunscreen on other areas of the body if those areas are exposed to the sun throughout the day. Sunscreens can contain physical and/or chemical blockers. Physical blockers are less likely to clog pores, these include zinc oxide and  titanium dioxide. Reapply every two hour and after swimming.     Sunscreen examples: https://www.ewg.org/sunscreen/    UV radiation  UVA radiation remains constant throughout the day and throughout the year. It is a longer wavelength than UVB and therefore penetrates deeper into the skin leading to immediate and delayed tanning, photoaging, and skin cancer. 70-80% of UVA and UVB radiation occurs between the hours of 10am-2pm.  UVB radiation  UVB radiation causes the most harmful effects and is more significant during the summer months. However, snow and ice can reflect UVB radiation leading to skin damage during the winter months as well. UVB radiation is responsible for tanning, burning, inflammation, delayed erythema (pinkness), pigmentation (brown spots), and skin cancer.     I recommend self monthly full body exams and yearly full body exams with a dermatology provider. If you develop a new or changing lesion please follow up for examination. Most skin cancers are pink and scaly or pink and pearly. However, we do see blue/brown/black skin cancers.  Consider the ABCDEs of melanoma when giving yourself your monthly full body exam ( don't forget the groin, buttocks, feet, toes, etc). A-asymmetry, B-borders, C-color, D-diameter, E-elevation or evolving. If you see any of these changes please follow up in clinic. If you cannot see your back I recommend purchasing a hand held mirror to use with a larger wall mirror.       Checking for Skin Cancer  You can find cancer early by checking your skin each month. There are 3 kinds of skin cancer. They are melanoma, basal cell carcinoma, and squamous cell carcinoma. Doing monthly skin checks is the best way to find new marks or skin changes. Follow the instructions below for checking your skin.   The ABCDEs of checking moles for melanoma   Check your moles or growths for signs of melanoma using ABCDE:   Asymmetry: the sides of the mole or growth don t match  Border: the edges  are ragged, notched, or blurred  Color: the color within the mole or growth varies  Diameter: the mole or growth is larger than 6 mm (size of a pencil eraser)  Evolving: the size, shape, or color of the mole or growth is changing (evolving is not shown in the images below)    Checking for other types of skin cancer  Basal cell carcinoma or squamous cell carcinoma have symptoms such as:     A spot or mole that looks different from all other marks on your skin  Changes in how an area feels, such as itching, tenderness, or pain  Changes in the skin's surface, such as oozing, bleeding, or scaliness  A sore that does not heal  New swelling or redness beyond the border of a mole    Who s at risk?  Anyone can get skin cancer. But you are at greater risk if you have:   Fair skin, light-colored hair, or light-colored eyes  Many moles or abnormal moles on your skin  A history of sunburns from sunlight or tanning beds  A family history of skin cancer  A history of exposure to radiation or chemicals  A weakened immune system  If you have had skin cancer in the past, you are at risk for recurring skin cancer.   How to check your skin  Do your monthly skin checkups in front of a full-length mirror. Check all parts of your body, including your:   Head (ears, face, neck, and scalp)  Torso (front, back, and sides)  Arms (tops, undersides, upper, and lower armpits)  Hands (palms, backs, and fingers, including under the nails)  Buttocks and genitals  Legs (front, back, and sides)  Feet (tops, soles, toes, including under the nails, and between toes)  If you have a lot of moles, take digital photos of them each month. Make sure to take photos both up close and from a distance. These can help you see if any moles change over time.   Most skin changes are not cancer. But if you see any changes in your skin, call your doctor right away. Only he or she can diagnose a problem. If you have skin cancer, seeing your doctor can be the first  step toward getting the treatment that could save your life.   [employer] last reviewed this educational content on 4/1/2019 2000-2020 [employer]. [address] [place] [address], [place] [address]. All rights reserved. This information is not intended as a substitute for professional medical care. Always follow your healthcare professional's instructions.       When should I call my doctor?  If you are worsening or not improving, please, contact us or seek urgent care as noted below.     Who should I call with questions (adults)?  [facility] (adult and pediatric): [phone]  [facility] (adult): [phone]  [facility] ([facility] and [place]) [phone]  For urgent needs outside of business hours call the [facility] at [phone] and ask for the dermatology resident on call to be paged  If this is a medical emergency and you are unable to reach an ER, Call [phone]      If you need a prescription refill, please contact your pharmacy. Refills are approved or denied by our Physicians during normal business hours, Monday through Fridays  Per office policy, refills will not be granted if you have not been seen within the past year (or sooner depending on your child's condition)       Wound Care After a Biopsy    What is a skin biopsy?  A skin biopsy allows the doctor to examine a very small piece of tissue under the microscope to determine the diagnosis and the best treatment for the skin condition. A local anesthetic (numbing medicine)  is injected with a very small needle into the skin area to be tested. A small piece of skin is taken from the area. Sometimes a suture (stitch) is used.     What are the risks of a skin biopsy?  I will experience scar, bleeding, swelling, pain, crusting and redness. I may experience incomplete removal or recurrence. Risks of this procedure are excessive  bleeding, bruising, infection, nerve damage, numbness, thick (hypertrophic or keloidal) scar and non-diagnostic biopsy.    How should I care for my wound for the first 24 hours?  Keep the wound dry and covered for 24 hours  If it bleeds, hold direct pressure on the area for 15 minutes. If bleeding does not stop then go to the emergency room  Avoid strenuous exercise the first 1-2 days or as your doctor instructs you    How should I care for the wound after 24 hours?  After 24 hours, remove the bandage  You may bathe or shower as normal  If you had a scalp biopsy, you can shampoo as usual and can use shower water to clean the biopsy site daily  Clean the wound twice a day with gentle soap and water  Do not scrub, be gentle  Apply white petroleum/Vaseline after cleaning the wound with a cotton swab or a clean finger, and keep the site covered with a Bandaid /bandage. Bandages are not necessary with a scalp biopsy  If you are unable to cover the site with a Bandaid /bandage, re-apply ointment 2-3 times a day to keep the site moist. Moisture will help with healing  Avoid strenuous activity for first 1-2 days  Avoid lakes, rivers, pools, and oceans until the stitches are removed or the site is healed    How do I clean my wound?  Wash hands thoroughly with soap or use hand  before all wound care  Clean the wound with gentle soap and water  Apply white petroleum/Vaseline  to wound after it is clean  Replace the Bandaid /bandage to keep the wound covered for the first few days or as instructed by your doctor  If you had a scalp biopsy, warm shower water to the area on a daily basis should suffice    What should I use to clean my wound?   Cotton-tipped applicators (Qtips )  White petroleum jelly (Vaseline ). Use a clean new container and use Q-tips to apply.  Bandaids   as needed  Gentle soap     How should I care for my wound long term?  Do not get your wound dirty  Keep up with wound care for one week or until the  area is healed.  A small scab will form and fall off by itself when the area is completely healed. The area will be red and will become pink in color as it heals. Sun protection is very important for how your scar will turn out. Sunscreen with an SPF 30 or greater is recommended once the area is healed.  If you have stitches, stitches need to be removed in 10 days. You may return to our clinic for this or you may have it done locally at your doctor s office.  You should have some soreness but it should be mild and slowly go away over several days. Talk to your doctor about using tylenol for pain,    When should I call my doctor?  If you have increased:   Pain or swelling  Pus or drainage (clear or slightly yellow drainage is ok)  Temperature over 100F  Spreading redness or warmth around wound    When will I hear about my results?  The biopsy results can take 2-3 weeks to come back. The clinic will call you with the results, send you a [employer] message, or have you schedule a follow-up clinic or phone time to discuss the results. Contact our clinics if you do not hear from us in 3 weeks.     Who should I call with questions?  [facility]: [phone]   [facility]: [phone]  For urgent needs outside of business hours call the [facility] at [phone] and ask for the dermatology resident on call

## 2024-03-14 NOTE — PROGRESS NOTES
[facility] Note  Encounter Date: Mar 14, 2024  Office Visit      Dermatology Problem List:  FBSE: 3/14/24    #NUB, S/p Biopsy performed on 3/14/24  Total Body Photography completed 3/26/21  1. History of melanoma, left back, Stage IIB (cT3b, cN0, cM0)   -Initial biopsy on 6/2019: BD at least 3.5 with ulceration and 10 mitoses/mm2  - s/p WLE with keystone flap and negative left axillary sentinel lymph node biopsy (7/18/2019) with Gwyn Raman and Tate   2. History of dysplastic nevi  - Severely dysplastic nevus - R shin - s/p excision 5/4/23  - Moderately atypical nevi x2 of the medial left upper back and mid upper back, s/p biopsy 9/10/19 with pigment recurrence 12/2019, s/p excision 3/4/20  - Nevus with congenital features and junctional atypia, right upper back, s/p biopsy 3/4/20, pigment recurrence noted 6/12/20, re-excised 7/23/20  3. DF + EIC, left lateral chest, s/p excision 4/6/21  4. EIC, right inframammary crease  - Ultrasound consistent 6/22/21, inflamed 12/201 then resolved, consider excision if recurrent inflammation  5. 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 She has two children ([place] in high school, Jany in college).    Family history: Father had unknown types of skin cancer. Patient and her father are no longer in contact for her to ask. No family history of pancreatic cancer.  ____________________________________________    Assessment & Plan:  # Folliculitis  - Stable, continue use of Hibiclens as needed.     # Neoplasm of unspecified behavior of the skin (D49.2) on the R lower back. The differential diagnosis includes DN vs MM vs other. .   - Shave biopsy performed today, see procedure note below.   - Photographed today     # HX of Melanoma  - ABCDEs:  Counseled ABCDEs of melanoma: Asymmetry, Border (irregularity), Color (not uniform, changes in color), Diameter (greater than 6 mm which is about the size of a pencil eraser), and Evolving (any changes in preexisting moles).  - Sun protection: Counseled SPF30+ sunscreen, UPF clothing, sun avoidance, tanning bed avoidance.   - Recommended yearly dental, ophthalmology, and gynecology exams.  - Recommended skin exams for all first-degree relatives.  - Recommended follow up is 6 months    #Hx of DN  - Sunscreen: Apply 20 minutes prior to going outdoors and reapply every two hours, when wet or sweating. We recommend using an SPF 30 or higher, and to use one that is water resistant.     - Advised to monitor for changing, non-healing, bleeding, painful, changing, or otherwise symptomatic lesions  - Continue annual skin exams    # Benign findings: multiple benign nevi, lentigines, cherry angiomas, SKs  - edu on benign etiology  - Signs and Symptoms of non-melanoma skin cancer and ABCDEs of melanoma reviewed with patient. Patient encouraged to perform monthly self skin exams and educated on how to perform them. UV precautions reviewed with patient. Patient was asked about new or changing moles/lesions on body.   - Sunscreen: Apply 20 minutes prior to going outdoors and reapply every two hours, when wet or sweating. We recommend using an SPF 30 or higher, and to use one that is water resistant.     - RTC for changes      Procedures Performed:   - Shave biopsy procedure note, location(s): see above. After discussion of benefits and risks including but not limited to bleeding, infection, scar, incomplete removal, recurrence, and non-diagnostic biopsy, written consent and photographs were obtained. The area was cleaned with isopropyl alcohol. 0.5mL of 1% lidocaine with epinephrine was injected to obtain adequate anesthesia of lesion(s). Shave biopsy at site(s) performed. Hemostasis was achieved with aluminium chloride.  Petrolatum ointment and a sterile dressing were applied. The patient tolerated the procedure and no complications were noted. The patient was provided with verbal and written post care instructions.      Follow-up: pending path results    Staff and scribe:    Carole PEREZ, am serving as a scribe to document services personally performed by Shea Marsh PA-C based on data collection and the provider's statements to me.    All risks, benefits and alternatives were discussed with patient.  Patient is in agreement and understands the assessment and plan.  All questions were answered.    Shea Marsh PA-C, [facility]  [facility]: Phone: [phone], Fax: [phone]  [facility]: Phone: [phone],  Fax: [phone]  [facility]: Phone: [phone], Fax: [phone]  ____________________________________________    CC: RECHECK (6 month recheck Malignant melanoma)      Reviewed patients past medical history and pertinent chart review prior to patient's visit today.     HPI:  Ms. Nancy Bronson is a 53 year old female who presents today as a return patient for FBS.     Today patient reported a spot of concern on her back. Patient noted some change in appearance. She continues to use Hibiclens on her groin and under her arms.     Has a hx of MM.    Patient is otherwise feeling well, without additional concerns.    Labs:  N/a    Physical Exam:  Vitals: LMP 12/02/2012   SKIN: Full skin, which includes the head/face, both arms, chest, back, abdomen,both legs, genitalia and/or groin buttocks, digits and/or nails, was examined.   - Archuleta's skin type II, has <100 nevi  - There are dome shaped bright red papules on the trunk.   - Multiple regular brown pigmented macules and papules are identified on the trunk and extremities.   - Scattered brown macules on sun exposed areas.  - There are waxy stuck on  tan to brown papules on the trunk.    -There are well healed surgical scars without erythema, nodularity or telangiectasias on the prior MM site. NERD  LYMPH: Examination of the pre/post auricular, occipital, cervical, clavicular, axillary and inguinal lymph nodes was negative.  - R lower back there is a 5 mm brown macule with speckling in the periphery.   - No other lesions of concern on areas examined.               Medications:  Current Outpatient Medications   Medication    chlorhexidine (HIBICLENS) 4 % liquid     No current facility-administered medications for this visit.      Past Medical/Surgical History:   Patient Active Problem List   Diagnosis    Snoring    S/P hysterectomy    Anemia    Malignant melanoma of torso excluding breast (H)     Past Medical History:   Diagnosis Date    Anemia     Malignant melanoma (H)     NO ACTIVE PROBLEMS

## 2024-05-02 ENCOUNTER — MYC MEDICAL ADVICE (OUTPATIENT)
Dept: FAMILY MEDICINE | Facility: CLINIC | Age: 54
End: 2024-05-02
Payer: COMMERCIAL

## 2024-05-02 NOTE — TELEPHONE ENCOUNTER
Patient Quality Outreach    Patient is due for the following:   Breast Cancer Screening - Mammogram    Next Steps:   No follow up needed at this time.    Type of outreach:    Sent [employer] message.    Next Steps:  Reach out within 90 days via [employer].    Max number of attempts reached: No. Will try again in 90 days if patient still on fail list.    Questions for provider review:    None           Julienne Mclean CMA  Chart routed to Care Team.

## 2024-09-04 ENCOUNTER — OFFICE VISIT (OUTPATIENT)
Dept: FAMILY MEDICINE | Facility: CLINIC | Age: 54
End: 2024-09-04
Payer: COMMERCIAL

## 2024-09-04 VITALS
HEIGHT: 65 IN | HEART RATE: 86 BPM | SYSTOLIC BLOOD PRESSURE: 116 MMHG | DIASTOLIC BLOOD PRESSURE: 79 MMHG | TEMPERATURE: 98.3 F | WEIGHT: 219.4 LBS | BODY MASS INDEX: 36.55 KG/M2 | OXYGEN SATURATION: 96 % | RESPIRATION RATE: 20 BRPM

## 2024-09-04 DIAGNOSIS — Z13.1 SCREENING FOR DIABETES MELLITUS: ICD-10-CM

## 2024-09-04 DIAGNOSIS — Z13.220 SCREENING, LIPID: ICD-10-CM

## 2024-09-04 DIAGNOSIS — R06.83 SNORING: ICD-10-CM

## 2024-09-04 DIAGNOSIS — Z13.29 SCREENING FOR THYROID DISORDER: ICD-10-CM

## 2024-09-04 DIAGNOSIS — Z00.00 ROUTINE GENERAL MEDICAL EXAMINATION AT A HEALTH CARE FACILITY: Primary | ICD-10-CM

## 2024-09-04 DIAGNOSIS — Z12.4 CERVICAL CANCER SCREENING: ICD-10-CM

## 2024-09-04 DIAGNOSIS — C43.59 MALIGNANT MELANOMA OF TORSO EXCLUDING BREAST (H): ICD-10-CM

## 2024-09-04 DIAGNOSIS — Z12.31 VISIT FOR SCREENING MAMMOGRAM: ICD-10-CM

## 2024-09-04 DIAGNOSIS — Z13.0 SCREENING FOR BLOOD DISEASE: ICD-10-CM

## 2024-09-04 LAB
ALBUMIN SERPL BCG-MCNC: 4 G/DL (ref 3.5–5.2)
ALP SERPL-CCNC: 107 U/L (ref 40–150)
ALT SERPL W P-5'-P-CCNC: 16 U/L (ref 0–50)
ANION GAP SERPL CALCULATED.3IONS-SCNC: 10 MMOL/L (ref 7–15)
AST SERPL W P-5'-P-CCNC: 22 U/L (ref 0–45)
BILIRUB SERPL-MCNC: 0.4 MG/DL
BUN SERPL-MCNC: 9 MG/DL (ref 6–20)
CALCIUM SERPL-MCNC: 9.3 MG/DL (ref 8.8–10.4)
CHLORIDE SERPL-SCNC: 104 MMOL/L (ref 98–107)
CHOLEST SERPL-MCNC: 163 MG/DL
CREAT SERPL-MCNC: 0.95 MG/DL (ref 0.51–0.95)
EGFRCR SERPLBLD CKD-EPI 2021: 71 ML/MIN/1.73M2
ERYTHROCYTE [DISTWIDTH] IN BLOOD BY AUTOMATED COUNT: 16.1 % (ref 10–15)
FASTING STATUS PATIENT QL REPORTED: YES
FASTING STATUS PATIENT QL REPORTED: YES
GLUCOSE SERPL-MCNC: 89 MG/DL (ref 70–99)
HBA1C MFR BLD: 5.8 % (ref 0–5.6)
HCO3 SERPL-SCNC: 27 MMOL/L (ref 22–29)
HCT VFR BLD AUTO: 43.6 % (ref 35–47)
HDLC SERPL-MCNC: 39 MG/DL
HGB BLD-MCNC: 13.6 G/DL (ref 11.7–15.7)
LDLC SERPL CALC-MCNC: 105 MG/DL
MCH RBC QN AUTO: 27.6 PG (ref 26.5–33)
MCHC RBC AUTO-ENTMCNC: 31.2 G/DL (ref 31.5–36.5)
MCV RBC AUTO: 89 FL (ref 78–100)
NONHDLC SERPL-MCNC: 124 MG/DL
PLATELET # BLD AUTO: 302 10E3/UL (ref 150–450)
POTASSIUM SERPL-SCNC: 4 MMOL/L (ref 3.4–5.3)
PROT SERPL-MCNC: 7.4 G/DL (ref 6.4–8.3)
RBC # BLD AUTO: 4.92 10E6/UL (ref 3.8–5.2)
SODIUM SERPL-SCNC: 141 MMOL/L (ref 135–145)
TRIGL SERPL-MCNC: 93 MG/DL
TSH SERPL DL<=0.005 MIU/L-ACNC: 2.57 UIU/ML (ref 0.3–4.2)
WBC # BLD AUTO: 9.7 10E3/UL (ref 4–11)

## 2024-09-04 PROCEDURE — 83036 HEMOGLOBIN GLYCOSYLATED A1C: CPT | Performed by: PHYSICIAN ASSISTANT

## 2024-09-04 PROCEDURE — G0145 SCR C/V CYTO,THINLAYER,RESCR: HCPCS | Performed by: PHYSICIAN ASSISTANT

## 2024-09-04 PROCEDURE — 80053 COMPREHEN METABOLIC PANEL: CPT | Performed by: PHYSICIAN ASSISTANT

## 2024-09-04 PROCEDURE — 87624 HPV HI-RISK TYP POOLED RSLT: CPT | Performed by: PHYSICIAN ASSISTANT

## 2024-09-04 PROCEDURE — 85027 COMPLETE CBC AUTOMATED: CPT | Performed by: PHYSICIAN ASSISTANT

## 2024-09-04 PROCEDURE — 36415 COLL VENOUS BLD VENIPUNCTURE: CPT | Performed by: PHYSICIAN ASSISTANT

## 2024-09-04 PROCEDURE — 84443 ASSAY THYROID STIM HORMONE: CPT | Performed by: PHYSICIAN ASSISTANT

## 2024-09-04 PROCEDURE — 80061 LIPID PANEL: CPT | Performed by: PHYSICIAN ASSISTANT

## 2024-09-04 PROCEDURE — 99396 PREV VISIT EST AGE 40-64: CPT | Performed by: PHYSICIAN ASSISTANT

## 2024-09-04 ASSESSMENT — PAIN SCALES - GENERAL: PAINLEVEL: NO PAIN (0)

## 2024-09-04 NOTE — PROGRESS NOTES
"Preventive Care Visit  [facility]  Ramona Ann Aaseby-Aguilera, PA-C, Family Medicine  Sep 4, 2024      Assessment & Plan     Routine general medical examination at a health care facility  Age and gender appropriate preventive care and screenings are discussed.  Particular attention to personal preventive care and age appropriate lifestyle including the incorporation of healthy diet and physical activity is made       Visit for screening mammogram    - MA Screening Bilateral w/ Joés; Future    Cervical cancer screening    - HPV and Gynecologic Cytology Panel - Recommended Age 30 - 65 Years    Screening for diabetes mellitus    - Comprehensive metabolic panel  - Hemoglobin A1c    Screening, lipid    - Lipid Profile    Screening for thyroid disorder    - TSH with free T4 reflex    Screening for blood disease    - CBC with platelets    Snoring  Will refer for sleep study  - Adult Sleep Eval & Management  Referral; Future    Malignant melanoma of torso excluding breast (H)  Sees derm every 6 months             BMI  Estimated body mass index is 36.51 kg/m  as calculated from the following:    Height as of this encounter: 1.651 m (5' 5\").    Weight as of this encounter: 99.5 kg (219 lb 6.4 oz).   Weight management plan: Discussed healthy diet and exercise guidelines    Counseling  Appropriate preventive services were addressed with this patient via screening, questionnaire, or discussion as appropriate for fall prevention, nutrition, physical activity, Tobacco-use cessation, social engagement, weight loss and cognition.  Checklist reviewing preventive services available has been given to the patient.  Reviewed patient's diet, addressing concerns and/or questions.   She is at risk for psychosocial distress and has been provided with information to reduce risk.           Terry Magallanes is a 54 year old, presenting for the following:  Physical (fasting)        9/4/2024     8:44 AM "   Additional Questions   Roomed by Janusz   Accompanied by self        Health Care Directive  Patient does not have a Health Care Directive or Living Will: Discussed advance care planning with patient; information given to patient to review.    HPI    Snoring:  was referred to sleep study before covid and didn't follow through. States daughter saids she snores loudly and concstant          9/1/2024   General Health   How would you rate your overall physical health? (!) FAIR   Feel stress (tense, anxious, or unable to sleep) Only a little      (!) STRESS CONCERN      9/1/2024   Nutrition   Three or more servings of calcium each day? Yes   Diet: Regular (no restrictions)   How many servings of fruit and vegetables per day? (!) 0-1   How many sweetened beverages each day? (!) 2            9/1/2024   Exercise   Days per week of moderate/strenous exercise 0 days   Average minutes spent exercising at this level 0 min      (!) EXERCISE CONCERN      9/1/2024   Social Factors   Frequency of gathering with friends or relatives Once a week   Worry food won't last until get money to buy more No   Food not last or not have enough money for food? No   Do you have housing? (Housing is defined as stable permanent housing and does not include staying ouside in a car, in a tent, in an abandoned building, in an overnight shelter, or couch-surfing.) Yes   Are you worried about losing your housing? No   Lack of transportation? No   Unable to get utilities (heat,electricity)? No            9/4/2024   Fall Risk   Gait Speed Test (Document in seconds) 1.89   Gait Speed Test Interpretation Less than or equal to 5.00 seconds - PASS             9/1/2024   Dental   Dentist two times every year? Yes            9/1/2024   TB Screening   Were you born outside of the [place]? No            Today's PHQ-2 Score:       9/4/2024     8:38 AM   PHQ-2 ( 1999 [employer])   Q1: Little interest or pleasure in doing things 0   Q2: Feeling down, depressed or  hopeless 0   PHQ-2 Score 0   Q1: Little interest or pleasure in doing things Not at all   Q2: Feeling down, depressed or hopeless Not at all   PHQ-2 Score 0           9/1/2024   Substance Use   Alcohol more than 3/day or more than 7/wk No   Do you use any other substances recreationally? No        Social History     Tobacco Use    Smoking status: Former    Smokeless tobacco: Never    Tobacco comments:     quit in 1995   Vaping Use    Vaping status: Never Used   Substance Use Topics    Alcohol use: Not Currently     Comment: I have a drink every once in awhile    Drug use: Never          Mammogram Screening - Mammogram every 1-2 years updated in Health Maintenance based on mutual decision making        9/1/2024   STI Screening   New sexual partner(s) since last STI/HIV test? No        History of abnormal Pap smear: No - age 30-64 HPV with reflex Pap every 5 years recommended        Latest Ref Rng & Units 4/1/2019     9:43 AM 4/1/2019     9:30 AM 4/21/2014    12:00 AM   PAP / HPV   PAP (Historical)  NIL   NIL    HPV 16 DNA NEG^Negative  Negative     HPV 18 DNA NEG^Negative  Negative     Other HR HPV NEG^Negative  Negative       ASCVD Risk   The 10-year ASCVD risk score (Gerardo KAN, et al., 2019) is: 1.6%    Values used to calculate the score:      Age: 54 years      Sex: Female      Is Non- : No      Diabetic: No      Tobacco smoker: No      Systolic Blood Pressure: 116 mmHg      Is BP treated: No      HDL Cholesterol: 43 mg/dL      Total Cholesterol: 170 mg/dL           Reviewed and updated as needed this visit by Provider                    BP Readings from Last 3 Encounters:   09/04/24 116/79   01/03/24 118/84   05/17/23 128/81    Wt Readings from Last 3 Encounters:   09/04/24 99.5 kg (219 lb 6.4 oz)   01/03/24 99.7 kg (219 lb 12.8 oz)   05/17/23 102.1 kg (225 lb 1.6 oz)                  Patient Active Problem List   Diagnosis    Snoring    S/P hysterectomy    Anemia    Malignant  melanoma of torso excluding breast (H)    Neoplasm of skin     Past Surgical History:   Procedure Laterality Date    ABDOMEN SURGERY  2012    hysterectomy    BIOPSY  2019    Removal of melanoma    BIOPSY NODE SENTINEL Left 2019    Procedure: Chambers Lymph Node Mapping and Biopsy;  Surgeon: Susan Raman MD;  Location: [facility] OR    COLONOSCOPY N/A 2021    Procedure: COLONOSCOPY (fv) - would like prep emailed;  Surgeon: Cris Avelar MD;  Location:  GI    DAVINCI HYSTERECTOMY SUPRACERVICAL  2012    benign pathProcedure: DAVINCI HYSTERECTOMY SUPRACERVICAL;  Davinci Supracervical Hysterectomy with Bilateral Salpingectomy, Cystoscopy ;  Surgeon: Selene Cardenas DO;  Location:  OR    ENT SURGERY  1975    EXCISE LESION BACK N/A 2019    Procedure: Wide Local Excision of Back Melanoma;  Surgeon: Susan Raman MD;  Location:  OR    HEAD & NECK SURGERY  1986    jaw surgery    LAPAROSCOPIC TUBAL LIGATION  2007    PROCEDURE PLACEHOLDER PLASTIC N/A 2019    Procedure: Back Reconstruction With Local Tissue Rearrangement;  Surgeon: DIPAK Ybarra MD;  Location:  OR    ZZC C-SEC ONLY,PREV C-SEC  ,    2    ZZC NONSPECIFIC PROCEDURE  1975    tonsils    ZZC NONSPECIFIC PROCEDURE  1985    jaw surgery       Social History     Tobacco Use    Smoking status: Former    Smokeless tobacco: Never    Tobacco comments:     quit in    Substance Use Topics    Alcohol use: Not Currently     Comment: I have a drink every once in awhile     Family History   Problem Relation Age of Onset    Respiratory Father         sleep apnea, hypertension    Family History Negative Mother     Osteoporosis Mother     Family History Negative Brother         1    Cancer Maternal Grandmother         Hodgkins     Cancer Maternal Grandfather          lung cancer-smoker    Other Cancer Maternal Grandfather     Cancer Paternal Grandfather  "        leukemia    Family History Negative Daughter     Cancer - colorectal Maternal Aunt         -at age of diagnosis -early 40's    Cancer Other         Hodgkins and  faternal twin brother as well     Other Cancer Other     Colon Cancer Maternal Half-Sister     Colon Cancer Other     Breast Cancer No family hx of          Current Outpatient Medications   Medication Sig Dispense Refill    chlorhexidine (HIBICLENS) 4 % liquid Use as body wash to underarms and groin 1-2x weekly, more frequently with flares 236 mL 3     No Known Allergies  Recent Labs   Lab Test 24  0938 21  0749 19  1410 19  0930   A1C 5.8*  --   --   --    LDL  --  110*  --  81   HDL  --  43*  --  40*   TRIG  --  85  --  118   ALT  --  30  --  19   CR  --  1.01 0.90 0.88   GFRESTIMATED  --  64 75 78   GFRESTBLACK  --  75 87 90   POTASSIUM  --  4.1 4.3 3.7   TSH  --   --   --  2.71          Review of Systems  CONSTITUTIONAL: NEGATIVE for fever, chills, change in weight  INTEGUMENTARY/SKIN: NEGATIVE for worrisome rashes, moles or lesions  EYES: NEGATIVE for vision changes or irritation  ENT/MOUTH: NEGATIVE for ear, mouth and throat problems  RESP: NEGATIVE for significant cough or SOB  BREAST: NEGATIVE for masses, tenderness or discharge  CV: NEGATIVE for chest pain, palpitations or peripheral edema  GI: NEGATIVE for nausea, abdominal pain, heartburn, or change in bowel habits  : NEGATIVE for frequency, dysuria, or hematuria  MUSCULOSKELETAL: NEGATIVE for significant arthralgias or myalgia  NEURO: NEGATIVE for weakness, dizziness or paresthesias  ENDOCRINE: NEGATIVE for temperature intolerance, skin/hair changes  HEME: NEGATIVE for bleeding problems  PSYCHIATRIC: NEGATIVE for changes in mood or affect     Objective    Exam  /79 (BP Location: Right arm, Patient Position: Sitting, Cuff Size: Adult Large)   Pulse 86   Temp 98.3  F (36.8  C) (Oral)   Resp 20   Ht 1.651 m (5' 5\")   Wt 99.5 kg (219 lb 6.4 oz)   " "LMP 12/02/2012   SpO2 96%   BMI 36.51 kg/m     Estimated body mass index is 36.51 kg/m  as calculated from the following:    Height as of this encounter: 1.651 m (5' 5\").    Weight as of this encounter: 99.5 kg (219 lb 6.4 oz).    Physical Exam  GENERAL: alert and no distress  EYES: Eyes grossly normal to inspection, PERRL and conjunctivae and sclerae normal  HENT: ear canals and TM's normal, nose and mouth without ulcers or lesions  NECK: no adenopathy, no asymmetry, masses, or scars  RESP: lungs clear to auscultation - no rales, rhonchi or wheezes  BREAST: normal without masses, tenderness or nipple discharge and no palpable axillary masses or adenopathy  CV: regular rate and rhythm, normal S1 S2, no S3 or S4, no murmur, click or rub, no peripheral edema  ABDOMEN: soft, nontender, no hepatosplenomegaly, no masses and bowel sounds normal  MS: no gross musculoskeletal defects noted, no edema  SKIN: multiple  mole - covering entire body of various sizes, colors and shapes.   NEURO: Normal strength and tone, mentation intact and speech normal  PSYCH: mentation appears normal, affect normal/bright        Signed Electronically by: Ramona Ann Aaseby-Aguilera, PA-C    "

## 2024-09-04 NOTE — PATIENT INSTRUCTIONS
Patient Education   Preventive Care Advice   This is general advice given by our system to help you stay healthy. However, your care team may have specific advice just for you. Please talk to your care team about your preventive care needs.  Nutrition  Eat 5 or more servings of fruits and vegetables each day.  Try wheat bread, brown rice and whole grain pasta (instead of white bread, rice, and pasta).  Get enough calcium and vitamin D. Check the label on foods and aim for 100% of the RDA (recommended daily allowance).  Lifestyle  Exercise at least 150 minutes each week  (30 minutes a day, 5 days a week).  Do muscle strengthening activities 2 days a week. These help control your weight and prevent disease.  No smoking.  Wear sunscreen to prevent skin cancer.  Have a dental exam and cleaning every 6 months.  Yearly exams  See your health care team every year to talk about:  Any changes in your health.  Any medicines your care team has prescribed.  Preventive care, family planning, and ways to prevent chronic diseases.  Shots (vaccines)   HPV shots (up to age 26), if you've never had them before.  Hepatitis B shots (up to age 59), if you've never had them before.  COVID-19 shot: Get this shot when it's due.  Flu shot: Get a flu shot every year.  Tetanus shot: Get a tetanus shot every 10 years.  Pneumococcal, hepatitis A, and RSV shots: Ask your care team if you need these based on your risk.  Shingles shot (for age 50 and up)  General health tests  Diabetes screening:  Starting at age 35, Get screened for diabetes at least every 3 years.  If you are younger than age 35, ask your care team if you should be screened for diabetes.  Cholesterol test: At age 39, start having a cholesterol test every 5 years, or more often if advised.  Bone density scan (DEXA): At age 50, ask your care team if you should have this scan for osteoporosis (brittle bones).  Hepatitis C: Get tested at least once in your life.  STIs (sexually  transmitted infections)  Before age 24: Ask your care team if you should be screened for STIs.  After age 24: Get screened for STIs if you're at risk. You are at risk for STIs (including HIV) if:  You are sexually active with more than one person.  You don't use condoms every time.  You or a partner was diagnosed with a sexually transmitted infection.  If you are at risk for HIV, ask about PrEP medicine to prevent HIV.  Get tested for HIV at least once in your life, whether you are at risk for HIV or not.  Cancer screening tests  Cervical cancer screening: If you have a cervix, begin getting regular cervical cancer screening tests starting at age 21.  Breast cancer scan (mammogram): If you've ever had breasts, begin having regular mammograms starting at age 40. This is a scan to check for breast cancer.  Colon cancer screening: It is important to start screening for colon cancer at age 45.  Have a colonoscopy test every 10 years (or more often if you're at risk) Or, ask your provider about stool tests like a FIT test every year or Cologuard test every 3 years.  To learn more about your testing options, visit:   .  For help making a decision, visit:   https://bit.ly/ap47675.  Prostate cancer screening test: If you have a prostate, ask your care team if a prostate cancer screening test (PSA) at age 55 is right for you.  Lung cancer screening: If you are a current or former smoker ages 50 to 80, ask your care team if ongoing lung cancer screenings are right for you.  For informational purposes only. Not to replace the advice of your health care provider. Copyright   2023 [facility]. All rights reserved. Clinically reviewed by the [facility] Program. [employer] 126063 - REV 01/24.  Preventing Falls: Care Instructions  Injuries and health problems such as trouble walking or poor eyesight can increase your risk of falling. So can some medicines. But there are things you can do to help  "prevent falls. You can exercise to get stronger. You can also arrange your home to make it safer.    Talk to your doctor about the medicines you take. Ask if any of them increase the risk of falls and whether they can be changed or stopped.   Try to exercise regularly. It can help improve your strength and balance. This can help lower your risk of falling.     Practice fall safety and prevention.    Wear low-heeled shoes that fit well and give your feet good support. Talk to your doctor if you have foot problems that make this hard.  Carry a cellphone or wear a medical alert device that you can use to call for help.  Use stepladders instead of chairs to reach high objects. Don't climb if you're at risk for falls. Ask for help, if needed.  Wear the correct eyeglasses, if you need them.    Make your home safer.    Remove rugs, cords, clutter, and furniture from walkways.  Keep your house well lit. Use night-lights in hallways and bathrooms.  Install and use sturdy handrails on stairways.  Wear nonskid footwear, even inside. Don't walk barefoot or in socks without shoes.    Be safe outside.    Use handrails, curb cuts, and ramps whenever possible.  Keep your hands free by using a shoulder bag or backpack.  Try to walk in well-lit areas. Watch out for uneven ground, changes in pavement, and debris.  Be careful in the winter. Walk on the grass or gravel when sidewalks are slippery. Use de-icer on steps and walkways. Add non-slip devices to shoes.    Put grab bars and nonskid mats in your shower or tub and near the toilet. Try to use a shower chair or bath bench when bathing.   Get into a tub or shower by putting in your weaker leg first. Get out with your strong side first. Have a phone or medical alert device in the bathroom with you.   Where can you learn more?  Go to https://www.[employer].net/patiented  Enter G117 in the search box to learn more about \"Preventing Falls: Care Instructions.\"  Current as of: July 17, " 2023               Content Version: 14.0    9441-0504 [email or web address].   Care instructions adapted under license by your healthcare professional. If you have questions about a medical condition or this instruction, always ask your healthcare professional. [email or web address] disclaims any warranty or liability for your use of this information.

## 2024-09-06 LAB
HPV HR 12 DNA CVX QL NAA+PROBE: NEGATIVE
HPV16 DNA CVX QL NAA+PROBE: NEGATIVE
HPV18 DNA CVX QL NAA+PROBE: NEGATIVE
HUMAN PAPILLOMA VIRUS FINAL DIAGNOSIS: NORMAL

## 2024-09-10 LAB
BKR AP ASSOCIATED HPV REPORT: NORMAL
BKR LAB AP GYN ADEQUACY: NORMAL
BKR LAB AP GYN INTERPRETATION: NORMAL
BKR LAB AP PREVIOUS ABNORMAL: NORMAL
PATH REPORT.COMMENTS IMP SPEC: NORMAL
PATH REPORT.COMMENTS IMP SPEC: NORMAL
PATH REPORT.RELEVANT HX SPEC: NORMAL

## 2024-09-12 ENCOUNTER — OFFICE VISIT (OUTPATIENT)
Dept: DERMATOLOGY | Facility: CLINIC | Age: 54
End: 2024-09-12
Payer: COMMERCIAL

## 2024-09-12 DIAGNOSIS — D22.9 MULTIPLE BENIGN NEVI: ICD-10-CM

## 2024-09-12 DIAGNOSIS — D18.01 CHERRY ANGIOMA: ICD-10-CM

## 2024-09-12 DIAGNOSIS — D49.2 NEOPLASM OF SKIN: ICD-10-CM

## 2024-09-12 DIAGNOSIS — L82.1 SK (SEBORRHEIC KERATOSIS): ICD-10-CM

## 2024-09-12 DIAGNOSIS — D23.9 DYSPLASTIC NEVUS: ICD-10-CM

## 2024-09-12 DIAGNOSIS — C43.59 MALIGNANT MELANOMA OF TORSO EXCLUDING BREAST (H): Primary | ICD-10-CM

## 2024-09-12 DIAGNOSIS — L81.4 LENTIGINES: ICD-10-CM

## 2024-09-12 PROCEDURE — 99213 OFFICE O/P EST LOW 20 MIN: CPT | Mod: 25 | Performed by: PHYSICIAN ASSISTANT

## 2024-09-12 PROCEDURE — 88305 TISSUE EXAM BY PATHOLOGIST: CPT | Performed by: PATHOLOGY

## 2024-09-12 PROCEDURE — 88342 IMHCHEM/IMCYTCHM 1ST ANTB: CPT | Performed by: PATHOLOGY

## 2024-09-12 PROCEDURE — 11102 TANGNTL BX SKIN SINGLE LES: CPT | Performed by: PHYSICIAN ASSISTANT

## 2024-09-12 ASSESSMENT — PAIN SCALES - GENERAL: PAINLEVEL: NO PAIN (0)

## 2024-09-12 NOTE — LETTER
9/12/2024      Nancy Bronson  [address] [facility] [address]      Dear Colleague,    Thank you for referring your patient, Nancy Bronson, to the [facility]. Please see a copy of my visit note below.    [facility] Dermatology Note  Encounter Date: Sep 12, 2024  Office Visit      Dermatology Problem List:  FBSE: 9/1/24    #NUB R perineum, S/p Biopsy performed, S/p Biopsy performed on 9/12/24:Pending biopsy  Total Body Photography completed 3/26/21  1. History of melanoma, left back, Stage IIB (cT3b, cN0, cM0)   -Initial biopsy on 6/2019: BD at least 3.5 with ulceration and 10 mitoses/mm2  - s/p WLE with keystone flap and negative left axillary sentinel lymph node biopsy (7/18/2019) with Gwyn Raman and Tate   2. History of dysplastic nevi  - Severely dysplastic nevus - R shin - s/p excision 5/4/23  - Moderately atypical nevi x2 of the medial left upper back and mid upper back, s/p biopsy 9/10/19 with pigment recurrence 12/2019, s/p excision 3/4/20  - Nevus with congenital features and junctional atypia, right upper back, s/p biopsy 3/4/20, pigment recurrence noted 6/12/20, re-excised 7/23/20  3. DF + EIC, left lateral chest, s/p excision 4/6/21  4. EIC, right inframammary crease  - Ultrasound consistent 6/22/21, inflamed 12/201 then resolved, consider excision if recurrent inflammation  5. 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 She has two children ([place] in high school, Jany in college).    Family history: Father had unknown types of skin cancer. Patient and her father are no longer in contact for her to ask. No family history of pancreatic cancer.  ____________________________________________    Assessment & Plan:    # Neoplasm of unspecified  behavior of the skin (D49.2) on the R perineum. The differential diagnosis includes MM vs evolving SK vs BLK vs other   - Shave biopsy performed today, see procedure note below.   - Photographed today     # HX of Melanoma  - ABCDEs: Counseled ABCDEs of melanoma: Asymmetry, Border (irregularity), Color (not uniform, changes in color), Diameter (greater than 6 mm which is about the size of a pencil eraser), and Evolving (any changes in preexisting moles).  - Sun protection: Counseled SPF30+ sunscreen, UPF clothing, sun avoidance, tanning bed avoidance.   - Recommended yearly dental, ophthalmology, and gynecology exams.  - Recommended skin exams for all first-degree relatives.  - Recommended follow up is 6 months    # Hx of DN   - Signs and Symptoms of non-melanoma skin cancer and ABCDEs of melanoma reviewed with patient. Patient encouraged to perform monthly self skin exams and educated on how to perform them. UV precautions reviewed with patient. Patient was asked about new or changing moles/lesions on body.   - Sunscreen: Apply 20 minutes prior to going outdoors and reapply every two hours, when wet or sweating. We recommend using an SPF 30 or higher, and to use one that is water resistant.       # Benign findings: multiple benign nevi, lentigines, cherry angiomas, SKs  - edu on benign etiology  - Signs and Symptoms of non-melanoma skin cancer and ABCDEs of melanoma reviewed with patient. Patient encouraged to perform monthly self skin exams and educated on how to perform them. UV precautions reviewed with patient. Patient was asked about new or changing moles/lesions on body.   - Sunscreen: Apply 20 minutes prior to going outdoors and reapply every two hours, when wet or sweating. We recommend using an SPF 30 or higher, and to use one that is water resistant.     - RTC for changes    Procedures Performed:   - Shave biopsy procedure note, location(s): see above. After discussion of benefits and risks including but  not limited to bleeding, infection, scar, incomplete removal, recurrence, and non-diagnostic biopsy, written consent and photographs were obtained. The area was cleaned with isopropyl alcohol. 0.5mL of 1% lidocaine with epinephrine was injected to obtain adequate anesthesia of lesion(s). Shave biopsy at site(s) performed. Hemostasis was achieved with aluminium chloride. Petrolatum ointment and a sterile dressing were applied. The patient tolerated the procedure and no complications were noted. The patient was provided with verbal and written post care instructions.      Follow-up: pending path results    Staff and scribe:    Scribe Disclosure:   I, MICHI HARRIS, am serving as a scribe; to document services personally performed by Shea Marsh PA-C -based on data collection and the provider's statements to me.     Provider Disclosure:  I agree with above History, Review of Systems, Physical exam and Plan.  I have reviewed the content of the documentation and have edited it as needed. I have personally performed the services documented here and the documentation accurately represents those services and the decisions I have made.      Electronically signed by:    All risks, benefits and alternatives were discussed with patient.  Patient is in agreement and understands the assessment and plan.  All questions were answered.    Shea Marsh PA-C, MPAS  [facility]: Phone: [phone], Fax: [phone]  [facility]: Phone: [phone],  Fax: [phone]  [facility]: Phone: [phone], Fax: [phone]  ____________________________________________    CC: Skin Check (FBSC)      Reviewed patients past medical history and pertinent chart review prior to patient's visit today.     HPI:  Ms. Nancy Bronson is a 54 year old female who presents today as a return patient for FBSC. Today patient reported a  spot of concern . Spot was found by her PCP where her PCP photgraphed spot on patients phone to show me today. Patient did not know it was there. There ae no associated sx.    Has a hx of Melanoma     Patient is otherwise feeling well, without additional concerns.    Labs:  N/A    Physical Exam:  Vitals: LMP 12/02/2012   SKIN: Total skin excluding the undergarment areas was performed. The exam included the head/face, neck, both arms, chest, back, abdomen, both legs, digits and/or nails.    - -Archuleta's skin type II, has <100 nevi  - There are dome shaped bright red papules on the trunk.   - Multiple regular brown pigmented macules and papules are identified on the trunk and extremities.   - Scattered brown macules on sun exposed areas.  - There are waxy stuck on tan to brown papules on the trunk.    LYMPH: Examination of the pre/post auricular, occipital, cervical, clavicular, axillary and inguinal lymph nodes was negative.  -There are well healed surgical scars without erythema, nodularity or telangiectasias on the prior    - R perineum there is a 1 cm tan grey macule  - No other lesions of concern on areas examined.         Medications:  Current Outpatient Medications   Medication Sig Dispense Refill     chlorhexidine (HIBICLENS) 4 % liquid Use as body wash to underarms and groin 1-2x weekly, more frequently with flares 236 mL 3     No current facility-administered medications for this visit.      Past Medical/Surgical History:   Patient Active Problem List   Diagnosis     Snoring     S/P hysterectomy     Anemia     Malignant melanoma of torso excluding breast (H)     Neoplasm of skin     Past Medical History:   Diagnosis Date     Anemia      Malignant melanoma (H)      Neoplasm of skin 03/14/2024     NO ACTIVE PROBLEMS                         Again, thank you for allowing me to participate in the care of your patient.        Sincerely,        Shea Marsh PA-C

## 2024-09-12 NOTE — PATIENT INSTRUCTIONS
Wound Care After a Biopsy    What is a skin biopsy?  A skin biopsy allows the doctor to examine a very small piece of tissue under the microscope to determine the diagnosis and the best treatment for the skin condition. A local anesthetic (numbing medicine)  is injected with a very small needle into the skin area to be tested. A small piece of skin is taken from the area. Sometimes a suture (stitch) is used.     What are the risks of a skin biopsy?  I will experience scar, bleeding, swelling, pain, crusting and redness. I may experience incomplete removal or recurrence. Risks of this procedure are excessive bleeding, bruising, infection, nerve damage, numbness, thick (hypertrophic or keloidal) scar and non-diagnostic biopsy.    How should I care for my wound for the first 24 hours?  Keep the wound dry and covered for 24 hours  If it bleeds, hold direct pressure on the area for 15 minutes. If bleeding does not stop then go to the emergency room  Avoid strenuous exercise the first 1-2 days or as your doctor instructs you    How should I care for the wound after 24 hours?  After 24 hours, remove the bandage  You may bathe or shower as normal  If you had a scalp biopsy, you can shampoo as usual and can use shower water to clean the biopsy site daily  Clean the wound twice a day with gentle soap and water  Do not scrub, be gentle  Apply white petroleum/Vaseline after cleaning the wound with a cotton swab or a clean finger, and keep the site covered with a Bandaid /bandage. Bandages are not necessary with a scalp biopsy  If you are unable to cover the site with a Bandaid /bandage, re-apply ointment 2-3 times a day to keep the site moist. Moisture will help with healing  Avoid strenuous activity for first 1-2 days  Avoid lakes, rivers, pools, and oceans until the stitches are removed or the site is healed    How do I clean my wound?  Wash hands thoroughly with soap or use hand  before all wound care  Clean the  wound with gentle soap and water  Apply white petroleum/Vaseline  to wound after it is clean  Replace the Bandaid /bandage to keep the wound covered for the first few days or as instructed by your doctor  If you had a scalp biopsy, warm shower water to the area on a daily basis should suffice    What should I use to clean my wound?   Cotton-tipped applicators (Qtips )  White petroleum jelly (Vaseline ). Use a clean new container and use Q-tips to apply.  Bandaids   as needed  Gentle soap     How should I care for my wound long term?  Do not get your wound dirty  Keep up with wound care for one week or until the area is healed.  A small scab will form and fall off by itself when the area is completely healed. The area will be red and will become pink in color as it heals. Sun protection is very important for how your scar will turn out. Sunscreen with an SPF 30 or greater is recommended once the area is healed.  If you have stitches, stitches need to be removed in 10 days. You may return to our clinic for this or you may have it done locally at your doctor s office.  You should have some soreness but it should be mild and slowly go away over several days. Talk to your doctor about using tylenol for pain,    When should I call my doctor?  If you have increased:   Pain or swelling  Pus or drainage (clear or slightly yellow drainage is ok)  Temperature over 100F  Spreading redness or warmth around wound    When will I hear about my results?  The biopsy results can take 2-3 weeks to come back. The clinic will call you with the results, send you a dermSearcht message, or have you schedule a follow-up clinic or phone time to discuss the results. Contact our clinics if you do not hear from us in 3 weeks.     Who should I call with questions?  [facility]: [phone]   [facility]: [phone]  For urgent needs outside of business hours call the [facility]  at [phone] and ask for the dermatology resident on call            Proper skin care from [facility]:    -Eliminate harsh soaps as they strip the natural oils from the skin, often resulting in dry itchy skin ( i.e. Dial, Zest, [place] Spring)  -Use mild soaps such as Cetaphil or Dove Sensitive Skin in the shower. You do not need to use soap on arms, legs, and trunk every time you shower unless visibly soiled.   -Avoid hot or cold showers.  -After showering, lightly dry off and apply moisturizing within 2-3 minutes. This will help trap moisture in the skin.   -Aggressive use of a moisturizer at least 1-2 times a day to the entire body (including -Vanicream, Cetaphil, Aquaphor or Cerave) and moisturize hands after every washing.  -We recommend using moisturizers that come in a tub that needs to be scooped out, not a pump. This has more of an oil base. It will hold moisture in your skin much better than a water base moisturizer. The above recommended are non-pore clogging.      Wear a sunscreen with at least SPF 30 on your face, ears, neck and V of the chest daily. Wear sunscreen on other areas of the body if those areas are exposed to the sun throughout the day. Sunscreens can contain physical and/or chemical blockers. Physical blockers are less likely to clog pores, these include zinc oxide and titanium dioxide. Reapply every two hour and after swimming.     Sunscreen examples: https://www.ewg.org/sunscreen/    UV radiation  UVA radiation remains constant throughout the day and throughout the year. It is a longer wavelength than UVB and therefore penetrates deeper into the skin leading to immediate and delayed tanning, photoaging, and skin cancer. 70-80% of UVA and UVB radiation occurs between the hours of 10am-2pm.  UVB radiation  UVB radiation causes the most harmful effects and is more significant during the summer months. However, snow and ice can reflect UVB radiation leading to skin damage during  the winter months as well. UVB radiation is responsible for tanning, burning, inflammation, delayed erythema (pinkness), pigmentation (brown spots), and skin cancer.     I recommend self monthly full body exams and yearly full body exams with a dermatology provider. If you develop a new or changing lesion please follow up for examination. Most skin cancers are pink and scaly or pink and pearly. However, we do see blue/brown/black skin cancers.  Consider the ABCDEs of melanoma when giving yourself your monthly full body exam ( don't forget the groin, buttocks, feet, toes, etc). A-asymmetry, B-borders, C-color, D-diameter, E-elevation or evolving. If you see any of these changes please follow up in clinic. If you cannot see your back I recommend purchasing a hand held mirror to use with a larger wall mirror.       Checking for Skin Cancer  You can find cancer early by checking your skin each month. There are 3 kinds of skin cancer. They are melanoma, basal cell carcinoma, and squamous cell carcinoma. Doing monthly skin checks is the best way to find new marks or skin changes. Follow the instructions below for checking your skin.   The ABCDEs of checking moles for melanoma   Check your moles or growths for signs of melanoma using ABCDE:   Asymmetry: the sides of the mole or growth don t match  Border: the edges are ragged, notched, or blurred  Color: the color within the mole or growth varies  Diameter: the mole or growth is larger than 6 mm (size of a pencil eraser)  Evolving: the size, shape, or color of the mole or growth is changing (evolving is not shown in the images below)    Checking for other types of skin cancer  Basal cell carcinoma or squamous cell carcinoma have symptoms such as:     A spot or mole that looks different from all other marks on your skin  Changes in how an area feels, such as itching, tenderness, or pain  Changes in the skin's surface, such as oozing, bleeding, or scaliness  A sore that does  not heal  New swelling or redness beyond the border of a mole    Who s at risk?  Anyone can get skin cancer. But you are at greater risk if you have:   Fair skin, light-colored hair, or light-colored eyes  Many moles or abnormal moles on your skin  A history of sunburns from sunlight or tanning beds  A family history of skin cancer  A history of exposure to radiation or chemicals  A weakened immune system  If you have had skin cancer in the past, you are at risk for recurring skin cancer.   How to check your skin  Do your monthly skin checkups in front of a full-length mirror. Check all parts of your body, including your:   Head (ears, face, neck, and scalp)  Torso (front, back, and sides)  Arms (tops, undersides, upper, and lower armpits)  Hands (palms, backs, and fingers, including under the nails)  Buttocks and genitals  Legs (front, back, and sides)  Feet (tops, soles, toes, including under the nails, and between toes)  If you have a lot of moles, take digital photos of them each month. Make sure to take photos both up close and from a distance. These can help you see if any moles change over time.   Most skin changes are not cancer. But if you see any changes in your skin, call your doctor right away. Only he or she can diagnose a problem. If you have skin cancer, seeing your doctor can be the first step toward getting the treatment that could save your life.   [employer] last reviewed this educational content on 4/1/2019 2000-2020 [employer]. [address] [place] [address], [place] [address]. All rights reserved. This information is not intended as a substitute for professional medical care. Always follow your healthcare professional's instructions.       When should I call my doctor?  If you are worsening or not improving, please, contact us or seek urgent care as noted below.     Who should I call with questions (adults)?    [facility] [place] [phone]  For urgent needs  outside of business hours call the [facility] at [phone] and ask for the dermatology resident on call to be paged  If this is a medical emergency and you are unable to reach an ER, Call [phone]      If you need a prescription refill, please contact your pharmacy. Refills are approved or denied by our Physicians during normal business hours, Monday through Fridays  Per office policy, refills will not be granted if you have not been seen within the past year (or sooner depending on your child's condition)

## 2024-09-12 NOTE — PROGRESS NOTES
[facility] Dermatology Note  Encounter Date: Sep 12, 2024  Office Visit      Dermatology Problem List:  FBSE: 9/1/24    #NUB R perineum, S/p Biopsy performed, S/p Biopsy performed on 9/12/24:Pending biopsy  Total Body Photography completed 3/26/21  1. History of melanoma, left back, Stage IIB (cT3b, cN0, cM0)   -Initial biopsy on 6/2019: BD at least 3.5 with ulceration and 10 mitoses/mm2  - s/p WLE with keystone flap and negative left axillary sentinel lymph node biopsy (7/18/2019) with Gwyn Raman and Tate   2. History of dysplastic nevi  - Severely dysplastic nevus - R shin - s/p excision 5/4/23  - Moderately atypical nevi x2 of the medial left upper back and mid upper back, s/p biopsy 9/10/19 with pigment recurrence 12/2019, s/p excision 3/4/20  - Nevus with congenital features and junctional atypia, right upper back, s/p biopsy 3/4/20, pigment recurrence noted 6/12/20, re-excised 7/23/20  3. DF + EIC, left lateral chest, s/p excision 4/6/21  4. EIC, right inframammary crease  - Ultrasound consistent 6/22/21, inflamed 12/201 then resolved, consider excision if recurrent inflammation  5. 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 She has two children ([place] in high school, Jany in college).    Family history: Father had unknown types of skin cancer. Patient and her father are no longer in contact for her to ask. No family history of pancreatic cancer.  ____________________________________________    Assessment & Plan:    # Neoplasm of unspecified behavior of the skin (D49.2) on the R perineum. The differential diagnosis includes MM vs evolving SK vs BLK vs other   - Shave biopsy performed today, see procedure note below.   - Photographed today     # HX of Melanoma  - ABCDEs: Counseled  ABCDEs of melanoma: Asymmetry, Border (irregularity), Color (not uniform, changes in color), Diameter (greater than 6 mm which is about the size of a pencil eraser), and Evolving (any changes in preexisting moles).  - Sun protection: Counseled SPF30+ sunscreen, UPF clothing, sun avoidance, tanning bed avoidance.   - Recommended yearly dental, ophthalmology, and gynecology exams.  - Recommended skin exams for all first-degree relatives.  - Recommended follow up is 6 months    # Hx of DN   - Signs and Symptoms of non-melanoma skin cancer and ABCDEs of melanoma reviewed with patient. Patient encouraged to perform monthly self skin exams and educated on how to perform them. UV precautions reviewed with patient. Patient was asked about new or changing moles/lesions on body.   - Sunscreen: Apply 20 minutes prior to going outdoors and reapply every two hours, when wet or sweating. We recommend using an SPF 30 or higher, and to use one that is water resistant.       # Benign findings: multiple benign nevi, lentigines, cherry angiomas, SKs  - edu on benign etiology  - Signs and Symptoms of non-melanoma skin cancer and ABCDEs of melanoma reviewed with patient. Patient encouraged to perform monthly self skin exams and educated on how to perform them. UV precautions reviewed with patient. Patient was asked about new or changing moles/lesions on body.   - Sunscreen: Apply 20 minutes prior to going outdoors and reapply every two hours, when wet or sweating. We recommend using an SPF 30 or higher, and to use one that is water resistant.     - RTC for changes    Procedures Performed:   - Shave biopsy procedure note, location(s): see above. After discussion of benefits and risks including but not limited to bleeding, infection, scar, incomplete removal, recurrence, and non-diagnostic biopsy, written consent and photographs were obtained. The area was cleaned with isopropyl alcohol. 0.5mL of 1% lidocaine with epinephrine was injected  to obtain adequate anesthesia of lesion(s). Shave biopsy at site(s) performed. Hemostasis was achieved with aluminium chloride. Petrolatum ointment and a sterile dressing were applied. The patient tolerated the procedure and no complications were noted. The patient was provided with verbal and written post care instructions.      Follow-up: pending path results    Staff and scribe:    Scribe Disclosure:   I, MICHI HARRIS, am serving as a scribe; to document services personally performed by Shea Marsh PA-C -based on data collection and the provider's statements to me.     Provider Disclosure:  I agree with above History, Review of Systems, Physical exam and Plan.  I have reviewed the content of the documentation and have edited it as needed. I have personally performed the services documented here and the documentation accurately represents those services and the decisions I have made.      Electronically signed by:    All risks, benefits and alternatives were discussed with patient.  Patient is in agreement and understands the assessment and plan.  All questions were answered.    Shea Marsh PA-C, [facility]  [facility] [place]: Phone: [phone], Fax: [phone]  [facility]: Phone: [phone],  Fax: [phone]  [facility]: Phone: [phone], Fax: [phone]  ____________________________________________    CC: Skin Check (FBSC)      Reviewed patients past medical history and pertinent chart review prior to patient's visit today.     HPI:  Ms. Nancy Bronson is a 54 year old female who presents today as a return patient for FBSC. Today patient reported a spot of concern . Spot was found by her PCP where her PCP photgraphed spot on patients phone to show me today. Patient did not know it was there. There ae no associated sx.    Has a hx of Melanoma     Patient is otherwise feeling well, without  additional concerns.    Labs:  N/A    Physical Exam:  Vitals: LMP 12/02/2012   SKIN: Total skin excluding the undergarment areas was performed. The exam included the head/face, neck, both arms, chest, back, abdomen, both legs, digits and/or nails.    - -Archuleta's skin type II, has <100 nevi  - There are dome shaped bright red papules on the trunk.   - Multiple regular brown pigmented macules and papules are identified on the trunk and extremities.   - Scattered brown macules on sun exposed areas.  - There are waxy stuck on tan to brown papules on the trunk.    LYMPH: Examination of the pre/post auricular, occipital, cervical, clavicular, axillary and inguinal lymph nodes was negative.  -There are well healed surgical scars without erythema, nodularity or telangiectasias on the prior    - R perineum there is a 1 cm tan grey macule  - No other lesions of concern on areas examined.         Medications:  Current Outpatient Medications   Medication Sig Dispense Refill    chlorhexidine (HIBICLENS) 4 % liquid Use as body wash to underarms and groin 1-2x weekly, more frequently with flares 236 mL 3     No current facility-administered medications for this visit.      Past Medical/Surgical History:   Patient Active Problem List   Diagnosis    Snoring    S/P hysterectomy    Anemia    Malignant melanoma of torso excluding breast (H)    Neoplasm of skin     Past Medical History:   Diagnosis Date    Anemia     Malignant melanoma (H)     Neoplasm of skin 03/14/2024    NO ACTIVE PROBLEMS

## 2024-09-18 ENCOUNTER — TELEPHONE (OUTPATIENT)
Dept: FAMILY MEDICINE | Facility: CLINIC | Age: 54
End: 2024-09-18
Payer: COMMERCIAL

## 2024-09-18 NOTE — TELEPHONE ENCOUNTER
Reason for Call:  Appointment Request    Patient requesting this type of appt:  sleep eval    Requested provider:  lexy    Reason patient unable to be scheduled: Not within requested timeframe    When does patient want to be seen/preferred time:  sooner than scheduled    Comments:     [facility] or would you prefer to receive a phone call?:   Patient would prefer a phone call   Okay to leave a detailed message?: Yes at Cell number on file:    Telephone Information:   Mobile [phone]       Call taken on 9/18/2024 at 1:21 PM by Audra Jimenez

## 2024-09-18 NOTE — TELEPHONE ENCOUNTER
Chart reviewed. Referral is for routine consult. Patient is scheduled first available appointment and on wait list. Clinic will reach out if sooner appointment becomes available.     Hannah SAMANIEGO RN  [facility]

## 2024-10-14 ENCOUNTER — ANCILLARY PROCEDURE (OUTPATIENT)
Dept: MAMMOGRAPHY | Facility: CLINIC | Age: 54
End: 2024-10-14
Attending: PHYSICIAN ASSISTANT
Payer: COMMERCIAL

## 2024-10-14 PROCEDURE — 77063 BREAST TOMOSYNTHESIS BI: CPT | Mod: TC | Performed by: RADIOLOGY

## 2024-10-14 PROCEDURE — 77067 SCR MAMMO BI INCL CAD: CPT | Mod: TC | Performed by: RADIOLOGY

## 2024-10-24 ENCOUNTER — VIRTUAL VISIT (OUTPATIENT)
Dept: SLEEP MEDICINE | Facility: CLINIC | Age: 54
End: 2024-10-24
Payer: COMMERCIAL

## 2024-10-24 VITALS — WEIGHT: 210 LBS | BODY MASS INDEX: 34.99 KG/M2 | HEIGHT: 65 IN

## 2024-10-24 DIAGNOSIS — D64.9 ANEMIA, UNSPECIFIED TYPE: ICD-10-CM

## 2024-10-24 DIAGNOSIS — R29.818 SUSPECTED SLEEP APNEA: Primary | ICD-10-CM

## 2024-10-24 DIAGNOSIS — Z90.710 S/P HYSTERECTOMY: ICD-10-CM

## 2024-10-24 DIAGNOSIS — E66.01 MORBID OBESITY DUE TO EXCESS CALORIES (H): ICD-10-CM

## 2024-10-24 DIAGNOSIS — R06.83 SNORING: ICD-10-CM

## 2024-10-24 PROCEDURE — 99205 OFFICE O/P NEW HI 60 MIN: CPT | Mod: 95 | Performed by: NURSE PRACTITIONER

## 2024-10-24 ASSESSMENT — SLEEP AND FATIGUE QUESTIONNAIRES
HOW LIKELY ARE YOU TO NOD OFF OR FALL ASLEEP WHILE SITTING INACTIVE IN A PUBLIC PLACE: WOULD NEVER DOZE
HOW LIKELY ARE YOU TO NOD OFF OR FALL ASLEEP WHILE WATCHING TV: MODERATE CHANCE OF DOZING
HOW LIKELY ARE YOU TO NOD OFF OR FALL ASLEEP IN A CAR, WHILE STOPPED FOR A FEW MINUTES IN TRAFFIC: WOULD NEVER DOZE
HOW LIKELY ARE YOU TO NOD OFF OR FALL ASLEEP WHILE SITTING QUIETLY AFTER LUNCH WITHOUT ALCOHOL: SLIGHT CHANCE OF DOZING
HOW LIKELY ARE YOU TO NOD OFF OR FALL ASLEEP WHILE SITTING AND TALKING TO SOMEONE: SLIGHT CHANCE OF DOZING
HOW LIKELY ARE YOU TO NOD OFF OR FALL ASLEEP WHILE LYING DOWN TO REST IN THE AFTERNOON WHEN CIRCUMSTANCES PERMIT: HIGH CHANCE OF DOZING
HOW LIKELY ARE YOU TO NOD OFF OR FALL ASLEEP WHEN YOU ARE A PASSENGER IN A CAR FOR AN HOUR WITHOUT A BREAK: SLIGHT CHANCE OF DOZING
HOW LIKELY ARE YOU TO NOD OFF OR FALL ASLEEP WHILE SITTING AND READING: SLIGHT CHANCE OF DOZING

## 2024-10-24 ASSESSMENT — PAIN SCALES - GENERAL: PAINLEVEL_OUTOF10: NO PAIN (0)

## 2024-10-24 NOTE — NURSING NOTE
Current patient location: [address] [place] [id number]    Is the patient currently in the state of [place]? YES    Visit mode:VIDEO    If the visit is dropped, the patient can be reconnected by: VIDEO VISIT: Text to cell phone:   Telephone Information:   Mobile [phone]       Will anyone else be joining the visit? NO  (If patient encounters technical issues they should call [phone]    Are changes needed to the allergy or medication list? No    Are refills needed on medications prescribed by this physician? NO    Rooming Documentation:  Questionnaire(s) completed    Reason for visit: Consult    Cheng LARA

## 2024-10-24 NOTE — PATIENT INSTRUCTIONS
"          MY TREATMENT INFORMATION FOR SLEEP APNEA-  Nancy Bronson    DOCTOR : BEVERLEY Paz CNP    Am I having a sleep study at a sleep center?  --->Due to normal delays, you will be contacted within 2-4 weeks to schedule    Am I having a home sleep study?  --->Watch the video for the device you are using:    -/drop off device-   https://www.JFD[employer].Asia.com/watch?v=yGGFBdELGhk    -Disposable device sent out require phone/computer application-   https://www.JFD[employer].Asia.com/watch?v=BCce_vbiwxE      Frequently asked questions:  1. What is Obstructive Sleep Apnea (BJ)? BJ is the most common type of sleep apnea. Apnea means, \"without breath.\"  Apnea is most often caused by narrowing or collapse of the upper airway as muscles relax during sleep.   Almost everyone has occasional apneas. Most people with sleep apnea have had brief interruptions at night frequently for many years.  The severity of sleep apnea is related to how frequent and severe the events are.   2. What are the consequences of BJ? Symptoms include: feeling sleepy during the day, snoring loudly, gasping or stopping of breathing, trouble sleeping, and occasionally morning headaches or heartburn at night.  Sleepiness can be serious and even increase the risk of falling asleep while driving. Other health consequences may include development of high blood pressure and other cardiovascular disease in persons who are susceptible. Untreated BJ  can contribute to heart disease, stroke and diabetes.   3. What are the treatment options? In most situations, sleep apnea is a lifelong disease that must be managed with daily therapy. Medications are not effective for sleep apnea and surgery is generally not considered until other therapies have been tried. Your treatment is your choice . Continuous Positive Airway (CPAP) works right away and is the therapy that is effective in nearly everyone. An oral device to hold your jaw forward is usually the next " most reliable option. Other options include postioning devices (to keep you off your back), weight loss, and surgery including a tongue pacing device. There is more detail about some of these options below.  4. Are my sleep studies covered by insurance? Although we will request verification of coverage, we advise you also check in advance of the study to ensure there is coverage.    Important tips for those choosing CPAP and similar devices  REMEMBER-IF YOU RECEIVE A CALL FROM  [phone]-->IT IS TO SETUP A DEVICE  For new devices, sign up for device ALESSIA to monitor your device for your followup visits  We encourage you to utilize the [employer] alessia or website ( https://[employer]/ ) to monitor your therapy progress and share the data with your healthcare team when you discuss your sleep apnea.                                                    Know your equipment:  CPAP is continuous positive airway pressure that prevents obstructive sleep apnea by keeping the throat from collapsing while you are sleeping. In most cases, the device is  smart  and can slowly self-adjusts if your throat collapses and keeps a record every day of how well you are treated-this information is available to you and your care team.  BPAP is bilevel positive airway pressure that keeps your throat open and also assists each breath with a pressure boost to maintain adequate breathing.  Special kinds of BPAP are used in patients who have inadequate breathing from lung or heart disease. In most cases, the device is  smart  and can slowly self-adjusts to assist breathing. Like CPAP, the device keeps a record of how well you are treated.  Your mask is your connection to the device. You get to choose what feels most comfortable and the staff will help to make sure if fits. Here: are some examples of the different masks that are available: Magnetic mask aids may assist with use but there are safety issues that should be addressed when  considering with magnets* ( see end of discussion).       Key points to remember on your journey with sleep apnea:  Sleep study.  PAP devices often need to be adjusted during a sleep study to show that they are effective and adjusted right.  Good tips to remember: Try wearing just the mask during a quiet time during the day so your body adapts to wearing it. A humidifier is recommended for comfort in most cases to prevent drying of your nose and throat. Allergy medication from your provider may help you if you are having nasal congestion.  Getting settled-in. It takes more than one night for most of us to get used to wearing a mask. Try wearing just the mask during a quiet time during the day so your body adapts to wearing it. A humidifier is recommended for comfort in most cases. Our team will work with you carefully on the first day and will be in contact within 4 days and again at 2 and 4 weeks for advice and remote device adjustments. Your therapy is evaluated by the device each day.   Use it every night. The more you are able to sleep naturally for 7-8 hours, the more likely you will have good sleep and to prevent health risks or symptoms from sleep apnea. Even if you use it 4 hours it helps. Occasionally all of us are unable to use a medical therapy, in sleep apnea, it is not dangerous to miss one night.   Communicate. Call our skilled team on the number provided on the first day if your visit for problems that make it difficult to wear the device. Over 2 out of 3 patients can learn to wear the device long-term with help from our team. Remember to call our team or your sleep providers if you are unable to wear the device as we may have other solutions for those who cannot adapt to mask CPAP therapy. It is recommended that you sleep your sleep provider within the first 3 months and yearly after that if you are not having problems.   Use it for your health. We encourage use of CPAP masks during daytime quiet  periods to allow your face and brain to adapt to the sensation of CPAP so that it will be a more natural sensation to awaken to at night or during naps. This can be very useful during the first few weeks or months of adapting to CPAP though it does not help medically to wear CPAP during wakefulness and  should not be used as a strategy just to meet guidelines.  Take care of your equipment. Make sure you clean your mask and tubing using directions every day and that your filter and mask are replaced as recommended or if they are not working.     *Masks with magnets:  Updated Contraindications  Masks with magnetic components are contraindicated for use by patients where they, or anyone in close physical contact while using the mask, have the following:   Active medical implants that interact with magnets (i.e., pacemakers, implantable cardioverter defibrillators (ICD), neurostimulators, cerebrospinal fluid (CSF) shunts, insulin/infusion pumps)   Metallic implants/objects containing ferromagnetic material (i.e., aneurysm clips/flow disruption devices, embolic coils, stents, valves, electrodes, implants to restore hearing or balance with implanted magnets, ocular implants, metallic splinters in the eye)  Updated Warning  Keep the mask magnets at a safe distance of at least 6 inches (150 mm) away from implants or medical devices that may be adversely affected by magnetic interference. This warning applies to you or anyone in close physical contact with your mask. The magnets are in the frame and lower headgear clips, with a magnetic field strength of up to 400mT. When worn, they connect to secure the mask but may inadvertently detach while asleep.  Implants/medical devices, including those listed within contraindications, may be adversely affected if they change function under external magnetic fields or contain ferromagnetic materials that attract/repel to magnetic fields (some metallic implants, e.g., contact lenses  with metal, dental implants, metallic cranial plates, screws, nahid hole covers, and bone substitute devices). Consult your physician and  of your implant / other medical device for information on the potential adverse effects of magnetic fields.    BESIDES CPAP, WHAT OTHER THERAPIES ARE THERE?    Positioning Device  Positioning devices are generally used when sleep apnea is mild and only occurs on your back.This example shows a pillow that straps around the waist. It may be appropriate for those whose sleep study shows milder sleep apnea that occurs primarily when lying flat on one's back. Preliminary studies have shown benefit but effectiveness at home may need to be verified by a home sleep test. These devices are generally not covered by medical insurance.  Examples of devices that maintain sleeping on the back to prevent snoring and mild sleep apnea.    Belt type body positioner  http://[employer]/    Electronic reminder  http://nightshifttherapy.[employer]/            Oral Appliance  What is oral appliance therapy?  An oral appliance device fits on your teeth at night like a retainer used after having braces. The device is made by a specialized dentist and requires several visits over 1-2 months before a manufactured device is made to fit your teeth and is adjusted to prevent your sleep apnea. Once an oral device is working properly, snoring should be improved. A home sleep test may be recommended at that time if to determine whether the sleep apnea is adequately treated.       Some things to remember:  -Oral devices are often, but not always, covered by your medical insurance. Be sure to check with your insurance provider.   -If you are referred for oral therapy, you will be given a list of specialized dentists to consider or you may choose to visit the Web site of the American Academy of Dental Sleep Medicine  -Oral devices are less likely to work if you have severe sleep apnea or are extremely  overweight.     More detailed information  An oral appliance is a small acrylic device that fits over the upper and lower teeth  (similar to a retainer or a mouth guard). This device slightly moves jaw forward, which moves the base of the tongue forward, opens the airway, improves breathing for effective treat snoring and obstructive sleep apnea in perhaps 7 out of 10 people .  The best working devices are custom-made by a dental device  after a mold is made of the teeth 1, 2, 3.  When is an oral appliance indicated?  Oral appliance therapy is recommended as a first-line treatment for patients with primary snoring, mild sleep apnea, and for patients with moderate sleep apnea who prefer appliance therapy to use of CPAP4, 5. Severity of sleep apnea is determined by sleep testing and is based on the number of respiratory events per hour of sleep.   How successful is oral appliance therapy?  The success rate of oral appliance therapy in patients with mild sleep apnea is 75-80% while in patients with moderate sleep apnea it is 50-70%. The chance of success in patients with severe sleep apnea is 40-50%. The research also shows that oral appliances have a beneficial effect on the cardiovascular health of BJ patients at the same magnitude as CPAP therapy7.  Oral appliances should be a second-line treatment in cases of severe sleep apnea, but if not completely successful then a combination therapy utilizing CPAP plus oral appliance therapy may be effective. Oral appliances tend to be effective in a broad range of patients although studies show that the patients who have the highest success are females, younger patients, those with milder disease, and less severe obesity. 3, 6.   Finding a dentist that practices dental sleep medicine  Specific training is available through the American Academy of Dental Sleep Medicine for dentists interested in working in the field of sleep. To find a dentist who is educated in  the field of sleep and the use of oral appliances, near you, visit the Web site of the American Academy of Dental Sleep Medicine.    References  1. Coty, et al. Objectively measured vs self-reported compliance during oral appliance therapy for sleep-disordered breathing. Chest 2013; 144(5): 8043-3292.  2. Geno et al. Objective measurement of compliance during oral appliance therapy for sleep-disordered breathing. Thorax 2013; 68(1): 91-96.  3. Ras et al. Mandibular advancement devices in 620 men and women with BJ and snoring: tolerability and predictors of treatment success. Chest 2004; 125: 7411-7736.  4. Sesar, et al. Oral appliances for snoring and BJ: a review. Sleep 2006; 29: 244-262.  5. Riddhi et al. Oral appliance treatment for BJ: an update. J Clin Sleep Med 2014; 10(2): 215-227.  6. Izabella et al. Predictors of OSAH treatment outcome. J Dent Res 2007; 86: 6162-4068.      Weight Loss:   Your Body mass index is 34.95 kg/m .    Being overweight does not necessarily mean you will have health consequences.  Those who have BMI over 35 or over 27 with existing medical conditions carries greater risk.   Weight loss decreases severity of sleep apnea in most people with obesity. For those with mild obesity who have developed snoring with weight gain, even 15-30 pound weight loss can improve and occasionally milder eliminate sleep apnea.  Structured and life-long dietary and health habits are necessary to lose weight and keep healthier weight levels.     The Comprehensive Weight loss program offers all aspects of weight loss strategies including two Non-Surgical Weight Loss Programs: Medical Weight Management and our 24 Week Healthy Lifestyle Program:    Medical Weight Management: You will meet with a Medical Weight Management Provider, as well as a Registered Dietician. The program may include medication therapy, dietary education, recommended exercise and physical therapy programs,  monthly support group meetings, and possible psychological counseling. Follow up visits with the provider or dietician are scheduled based on your progress and needs.    24 Week Healthy Lifestyle Program: This unique program is designed to give you the support of weekly appointments and activities thru a 24-week period. It may include all of the components of the basic program (above), with the addition of 11 individual Health  Visits, 24-week access to the [employer] website for over 700 online classes, and monthly support group meetings. This program has an out-of-pocket expense of $499 to cover the items that can not be billed to insurance (health coaches and [employer] access), and is non-refundable/non-transferable (you may be able to use a Health Savings Account; ask your HSA provider). There may be an optional meal replacement plan prescribed as well.   Surgical management achieves meaningful long-term weight loss and improvement in health risks in most patients with more severe obesity.      Sleep Apnea Surgery:    Surgery for obstructive sleep apnea is considered generally only when other therapies fail to work. Surgery may be discussed with you if you are having a difficult time tolerating CPAP and or when there is an abnormal structure that requires surgical correction.  Nose and throat surgeries often enlarge the airway to prevent collapse.  Most of these surgeries create pain for 1-2 weeks and up to half of the most common surgeries are not effective throughout life.  You should carefully discuss the benefits and drawbacks to surgery with your sleep provider and surgeon to determine if it is the best solution for you.   More information  Surgery for BJ is directed at areas that are responsible for narrowing or complete obstruction of the airway during sleep.  There are a wide range of procedures available to enlarge and/or stabilize the airway to prevent blockage of breathing in the three major  areas where it can occur: the palate, tongue, and nasal regions.  Successful surgical treatment depends on the accurate identification of the factors responsible for obstructive sleep apnea in each person.  A personalized approach is required because there is no single treatment that works well for everyone.  Because of anatomic variation, consultation with an examination by a sleep surgeon is a critical first step in determining what surgical options are best for each patient.  In some cases, examination during sedation may be recommended in order to guide the selection of procedures.  Patients will be counseled about risks and benefits as well as the typical recovery course after surgery. Surgery is typically not a cure for a person s BJ.  However, surgery will often significantly improve one s BJ severity (termed  success rate ).  Even in the absence of a cure, surgery will decrease the cardiovascular risk associated with OSA7; improve overall quality of life8 (sleepiness, functionality, sleep quality, etc).      Palate Procedures:  Patients with BJ often have narrowing of their airway in the region of their tonsils and uvula.  The goals of palate procedures are to widen the airway in this region as well as to help the tissues resist collapse.  Modern palate procedure techniques focus on tissue conservation and soft tissue rearrangement, rather than tissue removal.  Often the uvula is preserved in this procedure. Residual sleep apnea is common in patient after pharyngoplasty with an average reduction in sleep apnea events of 33%2.      Tongue Procedures:  ExamWhile patients are awake, the muscles that surround the throat are active and keep this region open for breathing. These muscles relax during sleep, allowing the tongue and other structures to collapse and block breathing.  There are several different tongue procedures available.  Selection of a tongue base procedure depends on characteristics seen on  physical exam.  Generally, procedures are aimed at removing bulky tissues in this area or preventing the back of the tongue from falling back during sleep.  Success rates for tongue surgery range from 50-62%3.    Hypoglossal Nerve Stimulation:  Hypoglossal nerve stimulation has recently received approval from the [place] Food and Drug Administration for the treatment of obstructive sleep apnea.  This is based on research showing that the system was safe and effective in treating sleep apnea6.  Results showed that the median AHI score decreased 68%, from 29.3 to 9.0. This therapy uses an implant system that senses breathing patterns and delivers mild stimulation to airway muscles, which keeps the airway open during sleep.  The system consists of three fully implanted components: a small generator (similar in size to a pacemaker), a breathing sensor, and a stimulation lead.  Using a small handheld remote, a patient turns the therapy on before bed and off upon awakening.    Candidates for this device must be greater than 18 years of age, have moderate to severe obstructive sleep apnea with less than 25% central events  (AHI between 15-65), BMI less than 35, have tried CPAP/oral appliance for at least 8 weeks without success, and have appropriate upper airway anatomy (determined by a sleep endoscopy performed by Dr. Wesley Rodriguez or Dr. Salvador Segal).    Nasal Procedures:  Nasal obstruction can interfere with nasal breathing during the day and night.  Studies have shown that relief of nasal obstruction can improve the ability of some patients to tolerate positive airway pressure therapy for obstructive sleep apnea1.  Treatment options include medications such as nasal saline, topical corticosteroid and antihistamine sprays, and oral medications such as antihistamines or decongestants. Non-surgical treatments can include external nasal dilators for selected patients. If these are not successful by themselves,  surgery can improve the nasal airway either alone or in combination with these other options.        Combination Procedures:  Combination of surgical procedures and other treatments may be recommended, particularly if patients have more than one area of narrowing or persistent positional disease.  The success rate of combination surgery ranges from 66-80%2,3.    References  [place]inak OLIVEROS. The Role of the Nose in Snoring and Obstructive Sleep Apnoea: An Update.  Eur Arch Otorhinolaryngol. 2011; 268: 1365-73.   Nino SM; Eren JA; Anaid JR; Pallanch JF; Morro MB; Chidi SG; Ezio DAVIS. Surgical modifications of the upper airway for obstructive sleep apnea in adults: a systematic review and meta-analysis. SLEEP 2010;33(10):8536-5128. Marcelle BUCHANAN. Hypopharyngeal surgery in obstructive sleep apnea: an evidence-based medicine review.  Arch Otolaryngol Head Neck Surg. 2006 Feb;132(2):206-13.  Roddy YH1, Ansley Y, [place] ALICIA. The efficacy of anatomically based multilevel surgery for obstructive sleep apnea. Otolaryngol Head Neck Surg. 2003 Oct;129(4):327-35.  Marcelle BUCHANAN, Goldberg A. Hypopharyngeal Surgery in Obstructive Sleep Apnea: An Evidence-Based Medicine Review. Arch Otolaryngol Head Neck Surg. 2006 Feb;132(2):206-13.  Chris SMITH et al. Upper-Airway Stimulation for Obstructive Sleep Apnea.  N Engl J Med. 2014 Jan 9;370(2):139-49.  Lavinia Y et al. Increased Incidence of Cardiovascular Disease in Middle-aged Men with Obstructive Sleep Apnea. Am J Respir Crit Care Med; 2002 166: 159-165  Millan EM et al. Studying Life Effects and Effectiveness of Palatopharyngoplasty (SLEEP) study: Subjective Outcomes of Isolated Uvulopalatopharyngoplasty. Otolaryngol Head Neck Surg. 2011; 144: 623-631.        WHAT IF I ONLY HAVE SNORING?    Mandibular advancement devices, lateral sleep positioning, long-term weight loss and treatment of nasal allergies have been shown to improve snoring.  Exercising tongue muscles with a game  (https://[employer]/us/alessia/soundly-reduce-snoring/el8498736018) or stimulating the tongue during the day with a device (https://doi.org/10.3390/mji29275483) have improved snoring in some individuals.  https://www.[employer]/  https://www.sleepfoundation.org/best-anti-snoring-mouthpieces-and-mouthguards    Remember to Drive Safe... Drive Alive     Sleep health profoundly affects your health, mood, and your safety.  Thirty three percent of the population (one in three of us) is not getting enough sleep and many have a sleep disorder. Not getting enough sleep or having an untreated / undertreated sleep condition may make us sleepy without even knowing it. In fact, our driving could be dramatically impaired due to our sleep health. As your provider, here are some things I would like you to know about driving:     Here are some warning signs for impairment and dangerous drowsy driving:              -Having been awake more than 16 hours               -Looking tired               -Eyelid drooping              -Head nodding (it could be too late at this point)              -Driving for more than 30 minutes     Some things you could do to make the driving safer if you are experiencing some drowsiness:              -Stop driving and rest              -Call for transportation              -Make sure your sleep disorder is adequately treated     Some things that have been shown NOT to work when experiencing drowsiness while driving:              -Turning on the radio              -Opening windows              -Eating any  distracting  /  entertaining  foods (e.g., sunflower seeds, candy, or any other)              -Talking on the phone      Your decision may not only impact your life, but also the life of others. Please, remember to drive safe for yourself and all of us.

## 2024-10-24 NOTE — PROGRESS NOTES
Virtual Visit Details    Type of service:  Video Visit 2:50 PM-3:25 PM  Originating Location (pt. Location): Home    Distant Location (provider location):  Off-site  Platform used for Video Visit: [facility]      Outpatient Sleep Medicine Consultation:      Name: Nancy Bronson MRN# [id number]   Age: 54 year old YOB: 1970     Date of Consultation: October 24, 2024  Consultation is requested by: Ramona Ann Aaseby-Aguilera, PA-C  [id number] [place] [address] Ramona Ann Aaseby-Aguil*  Primary care provider: Aaseby-Aguilera, Ramona Ann       Reason for Sleep Consult:     Nancy Bronson is sent by Ramona Ann Aaseby-Aguil* for a sleep consultation regarding snoring.    Patient s Reason for visit  Nancy QUESADA Arletholegario main reason for visit: (Patient-Rptd) Check for sleep apnea  Patient states problem(s) started: (Patient-Rptd) Yeats ago  Nancy Bronson's goals for this visit: (Patient-Rptd) Sleep test           Assessment and Plan:     Impression/Plan:    ICD-10-CM    1. Suspected sleep apnea  R29.818 HST - Home Sleep Apnea Test - Noxturnal, T-3 Returnable      2. Snoring  R06.83 Adult Sleep Eval & Management  Referral     HST - Home Sleep Apnea Test - Noxturnal, T-3 Returnable      3. S/P hysterectomy  Z90.710 HST - Home Sleep Apnea Test - Noxturnal, T-3 Returnable      4. Anemia, unspecified type  D64.9 HST - Home Sleep Apnea Test - Noxturnal, T-3 Returnable           Nancy Bronson     62 minutes spent with patient, all of which were spent face-to-face counseling, consulting, coordinating plan of care.      BEVERLEY Paz CNP         History of Present Illness:     Nancy Bronson presents to the sleep medicine clinic with concerns of possible sleep apnea.    Nancy Bronson has a medical history pertinent for malignant melanoma of the torso, anemia, surgical menopause, morbid obesity,    Surgical menopause    Prediabetic, hemoglobin A1c, 5.8, on 9/4/2024    CO2, 27 on  "9/4/2024    Severe difficulty staying asleep, severe problems waking up early.  Very dissatisfied with her sleep quality.    Awakenings for elimination needs during the night, if difficulty returning to sleep will \"play on the phone\"    Inadequate sleep hygiene practices    Passively naps 5 times per week    Suffers from loud snoring, snort arousals, uncertain of episodes of apnea.  Does have immediate family members who have been diagnosed with sleep apnea.    /79 (BP Location: Right arm, Patient Position: Sitting, Cuff Size: Adult Large)   Pulse 86   Temp 98.3  F (36.8  C) (Oral)   Resp 20   Ht 1.651 m (5' 5\")   Wt 99.5 kg (219 lb 6.4 oz)   LMP 12/02/2012   SpO2 96%   BMI 36.51 kg/m     Estimated body mass index is 36.51 kg/m  as calculated from the following:    Height as of this encounter: 1.651 m (5' 5\").    Weight as of this encounter: 99.5 kg (219 lb 6.4 oz).    Tonsils: Out    Neck Circumference: .>16    [place] Sleepiness Scale  Total score - [place]:9   (Less than 10 normal)     Insomnia Severity Scale  IMAN Total Score: 16  (normal 0-7, mild 8-14, moderate 15-21, severe 22-28)    STOP-BANG score 5/8 indicating a high pretest probability for sleep apnea.  We discussed basic pathophysiology of central sleep apnea, obstructive sleep apnea.  Also discussed implications of untreated sleep apnea as well as reviewed methods of treatment of sleep apnea both surgical and nonsurgical.  Patient will consider various treatment options.    Discussed effects of weight gain, weight loss, as well as alcohol intake with sleep apnea.    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 She has two children (Kahsmir in high school, Jany in college).    Past Sleep Evaluations:  None    SLEEP-WAKE SCHEDULE:     Work/School Days: Patient goes to school/work: (Patient-Rptd) Yes   Usually " gets into bed at (Patient-Rptd) 9:30  Takes patient about (Patient-Rptd) Right away to fall asleep  Has trouble falling asleep (Patient-Rptd) Never nights per week  Wakes up in the middle of the night (Patient-Rptd) 1 times.  Wakes up due to (Patient-Rptd) Use the bathroom;Other  She has trouble falling back asleep (Patient-Rptd) 0 times a week.   It usually takes (Patient-Rptd) Depends to get back to sleep  Patient is usually up at (Patient-Rptd) 6-7  Uses alarm: (Patient-Rptd) Yes    Weekends/Non-work Days/All Other Days:  Usually gets into bed at (Patient-Rptd) 9:30   Takes patient about (Patient-Rptd) Right away to fall asleep  Patient is usually up at (Patient-Rptd) 6-7  Uses alarm: (Patient-Rptd) Yes    Sleep Need  Patient gets  (Patient-Rptd) 6 sleep on average   Patient thinks she needs about (Patient-Rptd) 8 sleep    Nancy DIPAK Bronson prefers to sleep in this position(s): (Patient-Rptd) Side   Patient states they do the following activities in bed: (Patient-Rptd) Read;Eat;Watch TV;Use phone, computer, or tablet    Naps  Patient takes a purposeful nap (Patient-Rptd) 0 times a week and naps are usually (Patient-Rptd) Depends in duration  She feels better after a nap: (Patient-Rptd) Yes  She dozes off unintentionally (Patient-Rptd) 5 days per week  Patient has had a driving accident or near-miss due to sleepiness/drowsiness: (Patient-Rptd) No      SLEEP DISRUPTIONS:    Breathing/Snoring  Patient snores:(Patient-Rptd) Yes  Other people complain about her snoring: (Patient-Rptd) Yes  Patient has been told she stops breathing in her sleep:(Patient-Rptd) No  She has issues with the following:  .    Movement:  Patient gets pain, discomfort, with an urge to move:  (Patient-Rptd) No restless legs symptoms  It happens when she is resting:  (Patient-Rptd) No  It happens more at night:  (Patient-Rptd) No  Patient has been told she kicks her legs at night:  (Patient-Rptd) No     Behaviors in Sleep:  Nancy Bronson has  experienced the following behaviors while sleeping:    She has experienced sudden muscle weakness during the day: (Patient-Rptd) No  Pt denies bruxism, sleep talking, sleep walking, and dream enactment behavior. Pt denies sleep paralysis, hypnagogue and cataplexy.       Is there anything else you would like your sleep provider to know:        CAFFEINE AND OTHER SUBSTANCES:    Patient consumes caffeinated beverages per day:  (Patient-Rptd) 2  Last caffeine use is usually: (Patient-Rptd) Noon  List of any prescribed or over the counter stimulants that patient takes:    List of any prescribed or over the counter sleep medication patient takes:    List of previous sleep medications that patient has tried:    Patient drinks alcohol to help them sleep: (Patient-Rptd) No  Patient drinks alcohol near bedtime: (Patient-Rptd) No    Family History:  Patient has a family member been diagnosed with a sleep disorder: (Patient-Rptd) Yes  (Patient-Rptd) Sleep apnea         SCALES:  EPWORTH SLEEPINESS SCALE    Sitting and reading (Patient-Rptd) 1   Watching TV (Patient-Rptd) 2   Sitting, inactive in a public place (theatre or mtg.) (Patient-Rptd) 0    As a passenger in a car (Patient-Rptd) 1   Lying down to rest in the afternoon when circumstance permit (Patient-Rptd) 3   Sitting and talking to someone (Patient-Rptd) 1   Sitting quietly after lunch without alcohol (Patient-Rptd) 1   In a car, while stopped for a few minutes in traffic (Patient-Rptd) 0   TOTAL SCORE (Patient-Rptd) 9     INSOMNIA SEVERITY INDEX     Difficulty falling asleep (Patient-Rptd) 0   Difficult staying asleep (Patient-Rptd) 3   Problems waking up to early (Patient-Rptd) 3   How SATISFIED/DISSATISFIED are you with your CURRENT sleep pattern? (Patient-Rptd) 3   How NOTICEABLE to others do you think your sleep pattern is in terms of your quality of life? (Patient-Rptd) 3   How WORRIED/DISTRESSED are you about your current sleep pattern? (Patient-Rptd) 2   To what  "extent do you consider your sleep problem to INTERFERE with your daily fuctioning(e.g. daytime fatigue, mood, ability to function at work/daily chores, concentration, mood,etc.) CURRENTLY? (Patient-Rptd) 2   INSOMNIA SEVERITY INDEX TOTAL SCORE (Patient-Rptd) 16         Guidelines for Scoring/Interpretation:  Total score categories:  0-7 = No clinically significant insomnia   8-14 = Subthreshold insomnia   15-21 = Clinical insomnia (moderate severity)  22-28 = Clinical insomnia (severe)  Used via courtesy of www.[place].va.gov with permission from Leander Ruby PhD., [facility]      STOP BANG           10/24/2024     2:31 PM   STOP BANG Questionnaire (  2008, the American Society of Anesthesiologists, Inc. [employer]   B/P Clinic: --   BMI Clinic: 34.95         GAD7         No data to display                  CAGE-AID         No data to display                CAGE-AID reprinted with permission from the [place] Medical Journal, VICKIE Barth. and JENNY Mae, \"Conjoint screening questionnaires for alcohol and drug abuse\" [place] Medical Journal 94: 135-140, 1995.      PATIENT HEALTH QUESTIONNAIRE-9 (PHQ - 9)         No data to display                Developed by Gwyn Jensen, Nicki Galeano, Andres Betancourt and colleagues, with an educational varsha from [employer] No permission required to reproduce, translate, display or distribute.        Allergies:    No Known Allergies    Medications:    Current Outpatient Medications   Medication Sig Dispense Refill    chlorhexidine (HIBICLENS) 4 % liquid Use as body wash to underarms and groin 1-2x weekly, more frequently with flares 236 mL 3       Problem List:  Patient Active Problem List    Diagnosis Date Noted    Neoplasm of skin 03/14/2024     Priority: Medium    Malignant melanoma of torso excluding breast (H) 07/01/2019     Priority: Medium    Anemia 10/01/2013     Priority: Medium    S/P hysterectomy 01/22/2013     " Priority: Medium    Snoring 02/24/2011     Priority: Medium        Past Medical/Surgical History:  Past Medical History:   Diagnosis Date    Anemia     Malignant melanoma (H)     Neoplasm of skin 03/14/2024    NO ACTIVE PROBLEMS      Past Surgical History:   Procedure Laterality Date    ABDOMEN SURGERY  01/01/2012    hysterectomy    BIOPSY  7/18/2019    Removal of melanoma    BIOPSY NODE SENTINEL Left 07/18/2019    Procedure: [place] Lymph Node Mapping and Biopsy;  Surgeon: Susan Raman MD;  [facility]: [facility]    COLONOSCOPY N/A 06/28/2021    Procedure: COLONOSCOPY (fv) - would like prep emailed;  Surgeon: Cris Avelar MD;  Location:  GI    DAVINCI HYSTERECTOMY SUPRACERVICAL  12/04/2012    benign pathProcedure: DAVINCI HYSTERECTOMY SUPRACERVICAL;  Davinci Supracervical Hysterectomy with Bilateral Salpingectomy, Cystoscopy ;  Surgeon: Selene Cardenas DO;  Location:  OR    ENT SURGERY  01/01/1975    EXCISE LESION BACK N/A 07/18/2019    Procedure: Wide Local Excision of Back Melanoma;  Surgeon: Susan Raman MD;  Location:  OR    HEAD & NECK SURGERY  01/01/1986    jaw surgery    LAPAROSCOPIC TUBAL LIGATION  01/01/2007    PROCEDURE PLACEHOLDER PLASTIC N/A 07/18/2019    Procedure: Back Reconstruction With Local Tissue Rearrangement;  Surgeon: DIPAK Ybarra MD;  Location:  OR    ZZC C-SEC ONLY,PREV C-SEC  2001,2004    2    ZZC NONSPECIFIC PROCEDURE  01/01/1975    tonsils    ZZC NONSPECIFIC PROCEDURE  01/01/1985    jaw surgery       Social History:  Social History     Socioeconomic History    Marital status:      Spouse name: Juan José    Number of children: 1    Years of education: 13    Highest education level: Not on file   Occupational History    Occupation:      Comment: [employer] [place].   Tobacco Use    Smoking status: Former    Smokeless tobacco: Never    Tobacco comments:     quit in 1995   Vaping Use    Vaping status: Never Used   Substance and Sexual Activity     Alcohol use: Not Currently     Comment: I have a drink every once in awhile    Drug use: Never    Sexual activity: Not Currently     Partners: Male     Birth control/protection: Surgical   Other Topics Concern    Parent/sibling w/ CABG, MI or angioplasty before 65F 55M? No   Social History Narrative    Not on file     Social Drivers of Health     Financial Resource Strain: Low Risk  (9/1/2024)    Financial Resource Strain     Within the past 12 months, have you or your family members you live with been unable to get utilities (heat, electricity) when it was really needed?: No   Food Insecurity: Low Risk  (9/1/2024)    Food Insecurity     Within the past 12 months, did you worry that your food would run out before you got money to buy more?: No     Within the past 12 months, did the food you bought just not last and you didn t have money to get more?: No   Transportation Needs: Low Risk  (9/1/2024)    Transportation Needs     Within the past 12 months, has lack of transportation kept you from medical appointments, getting your medicines, non-medical meetings or appointments, work, or from getting things that you need?: No   Physical Activity: Inactive (9/1/2024)    Exercise Vital Sign     Days of Exercise per Week: 0 days     Minutes of Exercise per Session: 0 min   Stress: No Stress Concern Present (9/1/2024)    [place] [employer] - Occupational Stress Questionnaire     Feeling of Stress : Only a little   Social Connections: Unknown (9/1/2024)    Social Connection and Isolation Panel [NHANES]     Frequency of Communication with Friends and Family: Not on file     Frequency of Social Gatherings with Friends and Family: Once a week     Attends [employer]ist Services: Not on file     Active Member of Clubs or Organizations: Not on file     Attends Club or Organization Meetings: Not on file     Marital Status: Not on file   Interpersonal Safety: Low Risk  (9/4/2024)    Interpersonal Safety     Do you  feel physically and emotionally safe where you currently live?: Yes     Within the past 12 months, have you been hit, slapped, kicked or otherwise physically hurt by someone?: No     Within the past 12 months, have you been humiliated or emotionally abused in other ways by your partner or ex-partner?: No   Housing Stability: Low Risk  (2024)    Housing Stability     Do you have housing? : Yes     Are you worried about losing your housing?: No       Family History:  Family History   Problem Relation Age of Onset    Respiratory Father         sleep apnea, hypertension    Family History Negative Mother     Osteoporosis Mother     Family History Negative Brother         1    Cancer Maternal Grandmother         Hodgkins     Cancer Maternal Grandfather          lung cancer-smoker    Other Cancer Maternal Grandfather     Cancer Paternal Grandfather         leukemia    Family History Negative Daughter     Cancer - colorectal Maternal Aunt         -at age of diagnosis -early 40's    Cancer Other         Hodgkins and  faternal twin brother as well     Other Cancer Other     Colon Cancer Maternal Half-Sister     Colon Cancer Other     Breast Cancer No family hx of        Review of Systems:  A complete review of systems reviewed by me is negative with the exeption of what has been mentioned in the history of present illness.  In the last TWO WEEKS have you experienced any of the following symptoms?  Fevers: (Patient-Rptd) No  Night Sweats: (Patient-Rptd) No  Weight Gain: (Patient-Rptd) No  Pain at Night: (Patient-Rptd) No  Double Vision: (Patient-Rptd) No  Changes in Vision: (Patient-Rptd) No  Difficulty Breathing through Nose: (Patient-Rptd) No  Sore Throat in Morning: (Patient-Rptd) No  Dry Mouth in the Morning: (Patient-Rptd) No  Shortness of Breath Lying Flat: (Patient-Rptd) No  Shortness of Breath With Activity: (Patient-Rptd) No  Awakening with Shortness of Breath: (Patient-Rptd) No  Increased  "Cough: (Patient-Rptd) No  Heart Racing at Night: (Patient-Rptd) No  Swelling in Feet or Legs: (Patient-Rptd) No  Diarrhea at Night: (Patient-Rptd) No  Heartburn at Night: (Patient-Rptd) No  Urinating More than Once at Night: (Patient-Rptd) No  Losing Control of Urine at Night: (Patient-Rptd) No  Joint Pains at Night: (Patient-Rptd) No  Headaches in Morning: (Patient-Rptd) No  Weakness in Arms or Legs: (Patient-Rptd) No  Depressed Mood: (Patient-Rptd) No  Anxiety: (Patient-Rptd) No     Physical Examination:  Vitals: Ht 1.651 m (5' 5\")   Wt 95.3 kg (210 lb)   LMP 12/02/2012   BMI 34.95 kg/m    BMI= Body mass index is 34.95 kg/m .         Physical Exam  Vitals and nursing note reviewed.   Constitutional:       General: She is awake. She is not in acute distress.     Appearance: Normal appearance. She is well-developed and well-groomed. She is morbidly obese. She is not ill-appearing, toxic-appearing or diaphoretic.   HENT:      Head: Normocephalic and atraumatic.      Right Ear: External ear normal.      Left Ear: External ear normal.      Nose: Nose normal.      Mouth/Throat:      Mouth: Mucous membranes are moist.      Pharynx: Oropharynx is clear.   Eyes:      Conjunctiva/sclera: Conjunctivae normal.   Pulmonary:      Effort: Pulmonary effort is normal.   Neurological:      General: No focal deficit present.      Mental Status: She is alert and oriented to person, place, and time.   Psychiatric:         Mood and Affect: Mood normal.         Behavior: Behavior normal. Behavior is cooperative.         Thought Content: Thought content normal.         Judgment: Judgment normal.            Physical examination is limited by the nature of a virtual visit      All Labs Personally Reviewed                     Data: All pertinent previous laboratory data reviewed     Recent Labs   Lab Test 09/04/24  0938 06/17/21  0749    140   POTASSIUM 4.0 4.1   CHLORIDE 104 105   CO2 27 29   ANIONGAP 10 6   GLC 89 91   BUN 9.0 9 " "  CR 0.95 1.01   ALEXANDER 9.3 9.2       Recent Labs   Lab Test 09/04/24  0938   WBC 9.7   RBC 4.92   HGB 13.6   HCT 43.6   MCV 89   MCH 27.6   MCHC 31.2*   RDW 16.1*          Recent Labs   Lab Test 09/04/24  0938   PROTTOTAL 7.4   ALBUMIN 4.0   BILITOTAL 0.4   ALKPHOS 107   AST 22   ALT 16       TSH   Date Value   09/04/2024 2.57 uIU/mL   04/01/2019 2.71 mU/L   04/22/2014 2.17 mU/L       No results found for: \"UAMP\", \"UBARB\", \"BENZODIAZEUR\", \"UCANN\", \"UCOC\", \"OPIT\", \"UPCP\"    Iron Saturation Index   Date/Time Value Ref Range Status   04/01/2019 09:30 AM 19 15 - 46 % Final     Ferritin   Date/Time Value Ref Range Status   04/01/2019 09:30 AM 57 8 - 252 ng/mL Final       No results found for: \"PH\", \"PHARTERIAL\", \"PO2\", \"[id number]\", \"SAT\", \"PCO2\", \"HCO3\", \"BASEEXCESS\", \"MARYLOU\", \"BEB\"    @LABRCNTIPR(phv:4,pco2v:4,po2v:4,hco3v:4,moe:4,o2per:4)@    Echocardiology: No results found for this or any previous visit (from the past 4320 hours).        Chest x-ray: No results found for this or any previous visit from the past 365 days.      Chest CT: No results found for this or any previous visit from the past 365 days.      PFT: Most Recent [place] Pulmonary Function Testing        Ginny Candelario, BEVERLEY CNP 10/24/2024   Sleep Medicine            "

## 2025-03-13 ENCOUNTER — OFFICE VISIT (OUTPATIENT)
Dept: DERMATOLOGY | Facility: CLINIC | Age: 55
End: 2025-03-13
Payer: COMMERCIAL

## 2025-03-13 DIAGNOSIS — D49.2 NEOPLASM OF SKIN: ICD-10-CM

## 2025-03-13 DIAGNOSIS — Z87.898 HISTORY OF ATYPICAL NEVUS: ICD-10-CM

## 2025-03-13 DIAGNOSIS — L81.4 LENTIGINES: ICD-10-CM

## 2025-03-13 DIAGNOSIS — D18.01 CHERRY ANGIOMA: ICD-10-CM

## 2025-03-13 DIAGNOSIS — Z85.820 HISTORY OF MELANOMA: ICD-10-CM

## 2025-03-13 DIAGNOSIS — D22.9 MULTIPLE BENIGN NEVI: Primary | ICD-10-CM

## 2025-03-13 DIAGNOSIS — L82.1 SEBORRHEIC KERATOSES: ICD-10-CM

## 2025-03-13 NOTE — LETTER
3/13/2025      Nancy Bronson  [address] [address]      Dear Colleague,    Thank you for referring your patient, Nancy Bronson, to the [facility]. Please see a copy of my visit note below.    [facility] Dermatology Note  Encounter Date: Mar 13, 2025  Office Visit      Dermatology Problem List:  FBSE: 3/13/25    #NUB R inner perineum, S/p Biopsy performed on 3/13/25, pending results. Was previously biopsied at that side 6mo and was found to be a macular SK. Since then, papular lesion developed which has bereket bleeding  -Total Body Photography completed 3/26/21  1. History of melanoma, left back, Stage IIB (cT3b, cN0, cM0)   -Initial biopsy on 6/2019: BD at least 3.5 with ulceration and 10 mitoses/mm2  - s/p WLE with keystone flap and negative left axillary sentinel lymph node biopsy (7/18/2019) with Gwyn Raman and Tate   2. History of DNs  - Severely dysplastic nevus - R shin - s/p excision 5/4/23  - Moderately atypical nevi x2 of the medial left upper back and mid upper back, s/p biopsy 9/10/19 with pigment recurrence 12/2019, s/p excision 3/4/20  - Nevus with congenital features and junctional atypia, right upper back, s/p biopsy 3/4/20, pigment recurrence noted 6/12/20, re-excised 7/23/20  3. DF + EIC, left lateral chest, s/p excision 4/6/21  4. EIC, right inframammary crease  - Ultrasound consistent 6/22/21, inflamed 12/201 then resolved, consider excision if recurrent inflammation  5. Folliculitis vs early HS - L axilla - cystic lesions, occasionally cystic lesions of the inguinal crease - hibiclens  6. Benign Biopsy   - R perineum, Macular SK, S/p Biopsy 3/13/25.      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 She has two children    Family history: Father had unknown types of skin cancer. Patient and her father are no longer in contact for her to ask.  No family history of pancreatic cancer.  ____________________________________________    Assessment & Plan:    # NUB - R inner perineum - this was previously biopsied last visit and found to be a macular SK, since then however a papular lesion has developed which has bereket bleeding  - punch bx today - see below  - ddx: Pyogenic granuloma vs NMSC vs hypertrophic scar with super imposed angioma vs amelanotic melanoma vs other     # Hx of Melanoma  - ABCDEs: Counseled ABCDEs of melanoma: Asymmetry, Border (irregularity), Color (not uniform, changes in color), Diameter (greater than 6 mm which is about the size of a pencil eraser), and Evolving (any changes in preexisting moles).  - Sun protection: Counseled SPF30+ sunscreen, UPF clothing, sun avoidance, tanning bed avoidance.   - Recommended yearly dental, ophthalmology, and gynecology exams.  - Recommended skin exams for all first-degree relatives.  - Recommended follow up is 6 months    # Intertrigo/Dermatitis, Inframammary and inguinal creases - responds to use of cortisone most of the time, but continue to flare up  - ok to continue OTC hydrocortisone  - recommended use of OTC baby powder/corn starch  - Consider nystatin in the future if neither of he above are effective    # Hx of DN   - Signs and Symptoms of non-melanoma skin cancer and ABCDEs of melanoma reviewed with patient. Patient encouraged to perform monthly self skin exams and educated on how to perform them. UV precautions reviewed with patient. Patient was asked about new or changing moles/lesions on body.   - Sunscreen: Apply 20 minutes prior to going outdoors and reapply every two hours, when wet or sweating. We recommend using an SPF 30 or higher, and to use one that is water resistant.       # Benign findings: multiple benign nevi, lentigines, cherry angiomas, SKs  - edu on benign etiology  - Signs and Symptoms of non-melanoma skin cancer and ABCDEs of melanoma reviewed with patient. Patient  encouraged to perform monthly self skin exams and educated on how to perform them. UV precautions reviewed with patient. Patient was asked about new or changing moles/lesions on body.   - Sunscreen: Apply 20 minutes prior to going outdoors and reapply every two hours, when wet or sweating. We recommend using an SPF 30 or higher, and to use one that is water resistant.     - RTC for changes    Procedures Performed:   - Punch biopsy procedure note, location(s): see above. After discussion of benefits and risks including but not limited to bleeding, infection, scar, incomplete removal, recurrence, and non-diagnostic biopsy, written consent and photographs were obtained. The area was cleaned with isopropyl alcohol. 0.5mL of 1% lidocaine with epinephrine was injected to obtain adequate anesthesia and a 6 mm punch biopsy was performed at site(s). 4-0 Vicryl Rapide sutures were utilized to approximate the epidermal edges. Dermabond glue  and a bandage was applied to the wound. Explicit verbal and written wound care instructions were provided. The patient left the dermatology clinic in good condition.     Follow-up: pending path results    Staff and scribe:    Scribe Disclosure:   I, MICHI HARRIS, am serving as a scribe; to document services personally performed by Shea Marsh PA-C -based on data collection and the provider's statements to me.     Provider Disclosure:  I agree with above History, Review of Systems, Physical exam and Plan.  I have reviewed the content of the documentation and have edited it as needed. I have personally performed the services documented here and the documentation accurately represents those services and the decisions I have made.      Electronically signed by:    All risks, benefits and alternatives were discussed with patient.  Patient is in agreement and understands the assessment and plan.  All questions were answered.    Shea Marsh PA-C, MPAS  [facility]  [facility]: Phone: [phone], Fax: [phone]  [facility]: Phone: [phone],  Fax: [phone]  [facility]: Phone: [phone], Fax: [phone]  ____________________________________________    CC: Skin Check (6 month FBSC)      Reviewed patients past medical history and pertinent chart review prior to patient's visit today.     HPI:  Ms. Nancy Bronson is a 54 year old female who presents today as a return patient for FBSC. Today patient reported a spot of concern on her R buttock that developed since she was last here. She had a bx of the R perineum last time which showed to be a macular SK, and she thinks this new spot is in the same area. It bleeds when she wipes/uses the bathroom. Patient also has hemorrhoids, so unsure if that is what is going on.     Has a hx of Melanoma as well as DN in the past.    Patient is otherwise feeling well, without additional concerns.    Labs:  N/A    Physical Exam:  Vitals: [facility] 12/02/2012   SKIN: Total skin excluding the undergarment areas was performed. The exam included the head/face, neck, both arms, chest, back, abdomen, both legs, digits and/or nails.    - -Archuleta's skin type II, has <100 nevi  - There are dome shaped bright red papules on the trunk.   - Multiple regular brown pigmented macules and papules are identified on the trunk and extremities.   - Scattered brown macules on sun exposed areas.  - There are waxy stuck on tan to brown papules on the trunk.    LYMPH: Examination of the pre/post auricular, occipital, cervical, clavicular, axillary and inguinal lymph nodes was negative.  -There are well healed surgical scars without erythema, nodularity or telangiectasias on the prior  MM site, NERD  - L axilla there is an indurated nodule, no scar formation noted. NTTP  - R inner perineum there is a dome shaped pink papule with a central vascular lesion that bleeds on occasion.    - No other lesions of concern on areas examined.             Medications:  Current Outpatient Medications   Medication Sig Dispense Refill     chlorhexidine (HIBICLENS) 4 % liquid Use as body wash to underarms and groin 1-2x weekly, more frequently with flares 236 mL 3     No current facility-administered medications for this visit.      Past Medical/Surgical History:   Patient Active Problem List   Diagnosis     Snoring     Morbid obesity due to excess calories (H)     S/P hysterectomy     Anemia     Malignant melanoma of torso excluding breast (H)     Neoplasm of skin     Past Medical History:   Diagnosis Date     Anemia      Malignant melanoma (H)      Neoplasm of skin 03/14/2024     NO ACTIVE PROBLEMS                         Again, thank you for allowing me to participate in the care of your patient.        Sincerely,        Shea Marsh PA-C    Electronically signed

## 2025-03-13 NOTE — PATIENT INSTRUCTIONS
Try not to get wound wet for the first 48 hrs, then if dressing was place, it can be removed.  Let glue fall off on it's own  approx 1-2 weeks, the existing sutures will dissolve   Call Us If:  You have pain that is not controlled with Tylenol.  You have signs or symptoms of an infection, such as: fever over 100 degrees F, redness, warmth, or foul-smelling or yellow/creamy drainage from the wound.    Who should I call with questions?  [facility]: [phone]   [facility]: [phone]  For urgent needs outside of business hours call the [facility] at [phone] and ask for the dermatology resident on call     Proper skin care from [facility]:    -Eliminate harsh soaps as they strip the natural oils from the skin, often resulting in dry itchy skin ( i.e. Dial, Zest, [place])  -Use mild soaps such as Cetaphil or Dove Sensitive Skin in the shower. You do not need to use soap on arms, legs, and trunk every time you shower unless visibly soiled.   -Avoid hot or cold showers.  -After showering, lightly dry off and apply moisturizing within 2-3 minutes. This will help trap moisture in the skin.   -Aggressive use of a moisturizer at least 1-2 times a day to the entire body (including -Vanicream, Cetaphil, Aquaphor or Cerave) and moisturize hands after every washing.  -We recommend using moisturizers that come in a tub that needs to be scooped out, not a pump. This has more of an oil base. It will hold moisture in your skin much better than a water base moisturizer. The above recommended are non-pore clogging.      Wear a sunscreen with at least SPF 30 on your face, ears, neck and V of the chest daily. Wear sunscreen on other areas of the body if those areas are exposed to the sun throughout the day. Sunscreens can contain physical and/or chemical blockers. Physical blockers are less likely to clog pores, these include zinc oxide and  titanium dioxide. Reapply every two hour and after swimming.     Sunscreen examples: https://www.ewg.org/sunscreen/    UV radiation  UVA radiation remains constant throughout the day and throughout the year. It is a longer wavelength than UVB and therefore penetrates deeper into the skin leading to immediate and delayed tanning, photoaging, and skin cancer. 70-80% of UVA and UVB radiation occurs between the hours of 10am-2pm.  UVB radiation  UVB radiation causes the most harmful effects and is more significant during the summer months. However, snow and ice can reflect UVB radiation leading to skin damage during the winter months as well. UVB radiation is responsible for tanning, burning, inflammation, delayed erythema (pinkness), pigmentation (brown spots), and skin cancer.     I recommend self monthly full body exams and yearly full body exams with a dermatology provider. If you develop a new or changing lesion please follow up for examination. Most skin cancers are pink and scaly or pink and pearly. However, we do see blue/brown/black skin cancers.  Consider the ABCDEs of melanoma when giving yourself your monthly full body exam ( don't forget the groin, buttocks, feet, toes, etc). A-asymmetry, B-borders, C-color, D-diameter, E-elevation or evolving. If you see any of these changes please follow up in clinic. If you cannot see your back I recommend purchasing a hand held mirror to use with a larger wall mirror.       Checking for Skin Cancer  You can find cancer early by checking your skin each month. There are 3 kinds of skin cancer. They are melanoma, basal cell carcinoma, and squamous cell carcinoma. Doing monthly skin checks is the best way to find new marks or skin changes. Follow the instructions below for checking your skin.   The ABCDEs of checking moles for melanoma   Check your moles or growths for signs of melanoma using ABCDE:   Asymmetry: the sides of the mole or growth don t match  Border: the edges  are ragged, notched, or blurred  Color: the color within the mole or growth varies  Diameter: the mole or growth is larger than 6 mm (size of a pencil eraser)  Evolving: the size, shape, or color of the mole or growth is changing (evolving is not shown in the images below)    Checking for other types of skin cancer  Basal cell carcinoma or squamous cell carcinoma have symptoms such as:     A spot or mole that looks different from all other marks on your skin  Changes in how an area feels, such as itching, tenderness, or pain  Changes in the skin's surface, such as oozing, bleeding, or scaliness  A sore that does not heal  New swelling or redness beyond the border of a mole    Who s at risk?  Anyone can get skin cancer. But you are at greater risk if you have:   Fair skin, light-colored hair, or light-colored eyes  Many moles or abnormal moles on your skin  A history of sunburns from sunlight or tanning beds  A family history of skin cancer  A history of exposure to radiation or chemicals  A weakened immune system  If you have had skin cancer in the past, you are at risk for recurring skin cancer.   How to check your skin  Do your monthly skin checkups in front of a full-length mirror. Check all parts of your body, including your:   Head (ears, face, neck, and scalp)  Torso (front, back, and sides)  Arms (tops, undersides, upper, and lower armpits)  Hands (palms, backs, and fingers, including under the nails)  Buttocks and genitals  Legs (front, back, and sides)  Feet (tops, soles, toes, including under the nails, and between toes)  If you have a lot of moles, take digital photos of them each month. Make sure to take photos both up close and from a distance. These can help you see if any moles change over time.   Most skin changes are not cancer. But if you see any changes in your skin, call your doctor right away. Only he or she can diagnose a problem. If you have skin cancer, seeing your doctor can be the first  step toward getting the treatment that could save your life.   [employer] last reviewed this educational content on 4/1/2019 2000-2020 [employer]. [address] [place] [address], [place] [address]. All rights reserved. This information is not intended as a substitute for professional medical care. Always follow your healthcare professional's instructions.       When should I call my doctor?  If you are worsening or not improving, please, contact us or seek urgent care as noted below.     Who should I call with questions (adults)?    [facility] [phone]  For urgent needs outside of business hours call the [facility] at [phone] and ask for the dermatology resident on call to be paged  If this is a medical emergency and you are unable to reach an ER, Call [phone]      If you need a prescription refill, please contact your pharmacy. Refills are approved or denied by our Physicians during normal business hours, Monday through Friday.  Per office policy, refills will not be granted if you have not been seen within the past year (or sooner depending on the condition).

## 2025-03-13 NOTE — PROGRESS NOTES
[facility] Dermatology Note  Encounter Date: Mar 13, 2025  Office Visit      Dermatology Problem List:  FBSE: 3/13/25    #NUB R inner perineum, S/p Biopsy performed on 3/13/25, pending results. Was previously biopsied at that side 6mo and was found to be a macular SK. Since then, papular lesion developed which has bereket bleeding  -Total Body Photography completed 3/26/21  1. History of melanoma, left back, Stage IIB (cT3b, cN0, cM0)   -Initial biopsy on 6/2019: BD at least 3.5 with ulceration and 10 mitoses/mm2  - s/p WLE with keystone flap and negative left axillary sentinel lymph node biopsy (7/18/2019) with Gwyn Raman and Tate   2. History of DNs  - Severely dysplastic nevus - R shin - s/p excision 5/4/23  - Moderately atypical nevi x2 of the medial left upper back and mid upper back, s/p biopsy 9/10/19 with pigment recurrence 12/2019, s/p excision 3/4/20  - Nevus with congenital features and junctional atypia, right upper back, s/p biopsy 3/4/20, pigment recurrence noted 6/12/20, re-excised 7/23/20  3. DF + EIC, left lateral chest, s/p excision 4/6/21  4. EIC, right inframammary crease  - Ultrasound consistent 6/22/21, inflamed 12/201 then resolved, consider excision if recurrent inflammation  5. Folliculitis vs early HS - L axilla - cystic lesions, occasionally cystic lesions of the inguinal crease - hibiclens  6. Benign Biopsy   - R perineum, Macular SK, S/p Biopsy 3/13/25.      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 She has two children    Family history: Father had unknown types of skin cancer. Patient and her father are no longer in contact for her to ask. No family history of pancreatic cancer.  ____________________________________________    Assessment & Plan:    # NUB - R inner perineum - this was previously biopsied last visit and found to be a macular SK, since then however a papular lesion  has developed which has bereket bleeding  - punch bx today - see below  - ddx: Pyogenic granuloma vs NMSC vs hypertrophic scar with super imposed angioma vs amelanotic melanoma vs other     # Hx of Melanoma  - ABCDEs: Counseled ABCDEs of melanoma: Asymmetry, Border (irregularity), Color (not uniform, changes in color), Diameter (greater than 6 mm which is about the size of a pencil eraser), and Evolving (any changes in preexisting moles).  - Sun protection: Counseled SPF30+ sunscreen, UPF clothing, sun avoidance, tanning bed avoidance.   - Recommended yearly dental, ophthalmology, and gynecology exams.  - Recommended skin exams for all first-degree relatives.  - Recommended follow up is 6 months    # Intertrigo/Dermatitis, Inframammary and inguinal creases - responds to use of cortisone most of the time, but continue to flare up  - ok to continue OTC hydrocortisone  - recommended use of OTC baby powder/corn starch  - Consider nystatin in the future if neither of he above are effective    # Hx of DN   - Signs and Symptoms of non-melanoma skin cancer and ABCDEs of melanoma reviewed with patient. Patient encouraged to perform monthly self skin exams and educated on how to perform them. UV precautions reviewed with patient. Patient was asked about new or changing moles/lesions on body.   - Sunscreen: Apply 20 minutes prior to going outdoors and reapply every two hours, when wet or sweating. We recommend using an SPF 30 or higher, and to use one that is water resistant.       # Benign findings: multiple benign nevi, lentigines, cherry angiomas, SKs  - edu on benign etiology  - Signs and Symptoms of non-melanoma skin cancer and ABCDEs of melanoma reviewed with patient. Patient encouraged to perform monthly self skin exams and educated on how to perform them. UV precautions reviewed with patient. Patient was asked about new or changing moles/lesions on body.   - Sunscreen: Apply 20 minutes prior to going outdoors and reapply  every two hours, when wet or sweating. We recommend using an SPF 30 or higher, and to use one that is water resistant.     - RTC for changes    Procedures Performed:   - Punch biopsy procedure note, location(s): see above. After discussion of benefits and risks including but not limited to bleeding, infection, scar, incomplete removal, recurrence, and non-diagnostic biopsy, written consent and photographs were obtained. The area was cleaned with isopropyl alcohol. 0.5mL of 1% lidocaine with epinephrine was injected to obtain adequate anesthesia and a 6 mm punch biopsy was performed at site(s). 4-0 Vicryl Rapide sutures were utilized to approximate the epidermal edges. Dermabond glue  and a bandage was applied to the wound. Explicit verbal and written wound care instructions were provided. The patient left the dermatology clinic in good condition.     Follow-up: pending path results    Staff and scribe:    Scribe Disclosure:   I, MICHI HARRIS, am serving as a scribe; to document services personally performed by Shea Marsh PA-C -based on data collection and the provider's statements to me.     Provider Disclosure:  I agree with above History, Review of Systems, Physical exam and Plan.  I have reviewed the content of the documentation and have edited it as needed. I have personally performed the services documented here and the documentation accurately represents those services and the decisions I have made.      Electronically signed by:    All risks, benefits and alternatives were discussed with patient.  Patient is in agreement and understands the assessment and plan.  All questions were answered.    Shea Marsh PA-C, MPAS  [facility]: Phone: [phone], Fax: [phone]  [facility]: Phone: [phone],  Fax: [phone]  [facility]: Phone: [phone], Fax:  [phone]  ____________________________________________    CC: Skin Check (6 month FBSC)      Reviewed patients past medical history and pertinent chart review prior to patient's visit today.     HPI:  Ms. Nancy Bronson is a 54 year old female who presents today as a return patient for FBSC. Today patient reported a spot of concern on her R buttock that developed since she was last here. She had a bx of the R perineum last time which showed to be a macular SK, and she thinks this new spot is in the same area. It bleeds when she wipes/uses the bathroom. Patient also has hemorrhoids, so unsure if that is what is going on.     Has a hx of Melanoma as well as DN in the past.    Patient is otherwise feeling well, without additional concerns.    Labs:  N/A    Physical Exam:  Vitals: [facility] 12/02/2012   SKIN: Total skin excluding the undergarment areas was performed. The exam included the head/face, neck, both arms, chest, back, abdomen, both legs, digits and/or nails.    - -Archuleta's skin type II, has <100 nevi  - There are dome shaped bright red papules on the trunk.   - Multiple regular brown pigmented macules and papules are identified on the trunk and extremities.   - Scattered brown macules on sun exposed areas.  - There are waxy stuck on tan to brown papules on the trunk.    LYMPH: Examination of the pre/post auricular, occipital, cervical, clavicular, axillary and inguinal lymph nodes was negative.  -There are well healed surgical scars without erythema, nodularity or telangiectasias on the prior  MM site, NERD  - L axilla there is an indurated nodule, no scar formation noted. NTTP  - R inner perineum there is a dome shaped pink papule with a central vascular lesion that bleeds on occasion.   - No other lesions of concern on areas examined.             Medications:  Current Outpatient Medications   Medication Sig Dispense Refill    chlorhexidine (HIBICLENS) 4 % liquid Use as body wash to underarms and groin  1-2x weekly, more frequently with flares 236 mL 3     No current facility-administered medications for this visit.      Past Medical/Surgical History:   Patient Active Problem List   Diagnosis    Snoring    Morbid obesity due to excess calories (H)    S/P hysterectomy    Anemia    Malignant melanoma of torso excluding breast (H)    Neoplasm of skin     Past Medical History:   Diagnosis Date    Anemia     Malignant melanoma (H)     Neoplasm of skin 03/14/2024    NO ACTIVE PROBLEMS

## 2025-03-19 ASSESSMENT — SLEEP AND FATIGUE QUESTIONNAIRES
HOW LIKELY ARE YOU TO NOD OFF OR FALL ASLEEP WHILE SITTING QUIETLY AFTER LUNCH WITHOUT ALCOHOL: SLIGHT CHANCE OF DOZING
HOW LIKELY ARE YOU TO NOD OFF OR FALL ASLEEP WHILE SITTING AND TALKING TO SOMEONE: SLIGHT CHANCE OF DOZING
HOW LIKELY ARE YOU TO NOD OFF OR FALL ASLEEP WHILE SITTING AND READING: MODERATE CHANCE OF DOZING
HOW LIKELY ARE YOU TO NOD OFF OR FALL ASLEEP WHILE WATCHING TV: MODERATE CHANCE OF DOZING
HOW LIKELY ARE YOU TO NOD OFF OR FALL ASLEEP IN A CAR, WHILE STOPPED FOR A FEW MINUTES IN TRAFFIC: WOULD NEVER DOZE
HOW LIKELY ARE YOU TO NOD OFF OR FALL ASLEEP WHILE SITTING INACTIVE IN A PUBLIC PLACE: WOULD NEVER DOZE
HOW LIKELY ARE YOU TO NOD OFF OR FALL ASLEEP WHILE LYING DOWN TO REST IN THE AFTERNOON WHEN CIRCUMSTANCES PERMIT: HIGH CHANCE OF DOZING
HOW LIKELY ARE YOU TO NOD OFF OR FALL ASLEEP WHEN YOU ARE A PASSENGER IN A CAR FOR AN HOUR WITHOUT A BREAK: SLIGHT CHANCE OF DOZING

## 2025-03-25 ENCOUNTER — OFFICE VISIT (OUTPATIENT)
Dept: SLEEP MEDICINE | Facility: CLINIC | Age: 55
End: 2025-03-25
Attending: NURSE PRACTITIONER
Payer: COMMERCIAL

## 2025-03-25 DIAGNOSIS — Z90.710 S/P HYSTERECTOMY: ICD-10-CM

## 2025-03-25 DIAGNOSIS — R06.83 SNORING: ICD-10-CM

## 2025-03-25 DIAGNOSIS — D64.9 ANEMIA, UNSPECIFIED TYPE: ICD-10-CM

## 2025-03-25 DIAGNOSIS — R29.818 SUSPECTED SLEEP APNEA: ICD-10-CM

## 2025-03-25 NOTE — PROGRESS NOTES
Pt is completing a home sleep test. Pt was instructed on how to put on the Noxturnal T3 device and associated equipment before going to bed and given the opportunity to practice putting it on before leaving the sleep center. Pt was reminded to bring the home sleep test kit back to the center tomorrow, at the scheduled time for download and reporting. Patient was instructed to complete study using the following treatment?  None  Neck circumference: 45 CM / 17.75 inches.  Device number: 46  Eve Carrillo MA

## 2025-03-26 ENCOUNTER — DOCUMENTATION ONLY (OUTPATIENT)
Dept: SLEEP MEDICINE | Facility: CLINIC | Age: 55
End: 2025-03-26
Payer: COMMERCIAL

## 2025-03-26 NOTE — PROGRESS NOTES
Pt returned HST device. It was downloaded and forwarded data to the clinical specialist for scoring.   Eve Carrillo MA

## 2025-03-26 NOTE — PROGRESS NOTES
HST POST-STUDY QUESTIONNAIRE    What time did you go to bed?  10PM   How long do you think it took to fall asleep?  30MIN  What time did you wake up to start the day?  515AM  Did you get up during the night at all?  YES  If you woke up, do you remember approximately what time(s)? 1245AM, 230AM, 430AM  Did you have any difficulty with the equipment?  No  Did you us any type of treatment with this study?  None  Was the head of the bed elevated? No  Did you sleep in a recliner?  No  Did you stop using CPAP at least 3 days before this test?  NA  Any other information you'd like us to know?

## 2025-05-15 ENCOUNTER — RESULTS FOLLOW-UP (OUTPATIENT)
Dept: DERMATOLOGY | Facility: CLINIC | Age: 55
End: 2025-05-15

## 2025-05-15 LAB
PATH REPORT.COMMENTS IMP SPEC: NORMAL
PATH REPORT.FINAL DX SPEC: NORMAL
PATH REPORT.GROSS SPEC: NORMAL
PATH REPORT.MICROSCOPIC SPEC OTHER STN: NORMAL
PATH REPORT.RELEVANT HX SPEC: NORMAL

## (undated) DEVICE — SU SILK 3-0 SH 30" K832H

## (undated) DEVICE — KIT ENDO TURNOVER/PROCEDURE W/CLEAN A SCOPE LINERS 103888

## (undated) DEVICE — Device

## (undated) DEVICE — DRSG PRIMAPORE 02X3" 7133

## (undated) DEVICE — ESU ELEC BLADE 2.75" COATED/INSULATED E1455

## (undated) DEVICE — GLOVE PROTEXIS BLUE W/NEU-THERA 6.0  2D73EB60

## (undated) DEVICE — PACK MINOR CUSTOM ASC

## (undated) DEVICE — SPONGE KITTNER 30-101

## (undated) DEVICE — DRAPE U SPLIT 74X120" 29440

## (undated) DEVICE — STPL SKIN 35W ROTATING HEAD PRW35

## (undated) DEVICE — SOL WATER IRRIG 500ML BOTTLE 2F7113

## (undated) DEVICE — BASIN EMESIS STERILE  SSK9005A

## (undated) DEVICE — DRSG STERI STRIP 1/2X4" R1547

## (undated) DEVICE — LINEN TOWEL PACK X6 WHITE 5487

## (undated) DEVICE — ESU PENCIL SMOKE EVAC W/ROCKER SWITCH 0703-047-000

## (undated) DEVICE — DRAPE LAP PEDS DISP 29492

## (undated) DEVICE — SU MONOCRYL 4-0 P-3 18" UND Y494G

## (undated) DEVICE — BLADE KNIFE SURG 10 371110

## (undated) DEVICE — SYR 05ML LL W/O NDL

## (undated) DEVICE — SU PDS II 0 CTX 36" Z370T

## (undated) DEVICE — SU VICRYL 3-0 SH 27" UND J416H

## (undated) DEVICE — SU MONOCRYL 4-0 PS-2 27" UND Y426H

## (undated) DEVICE — SPECIMEN CONTAINER URINE 90ML STERILE 75.1435.002

## (undated) DEVICE — NDL 30GA 0.5" 305106

## (undated) DEVICE — SUCTION MANIFOLD NEPTUNE 2 SYS 1 PORT 702-025-000

## (undated) DEVICE — ESU GROUND PAD ADULT W/CORD E7507

## (undated) DEVICE — NDL BLUNT 18GA 1" 8881-202348

## (undated) DEVICE — SU PDS II 1 CTX 36" Z371T

## (undated) DEVICE — SPONGE RAY-TEC 4X8" 7318

## (undated) DEVICE — DRSG PRIMAPORE 03 1/8X6" 66000318

## (undated) DEVICE — PREP CHLORAPREP 26ML TINTED ORANGE  260815

## (undated) DEVICE — DRAPE IOBAN INCISE 23X17" 6650EZ

## (undated) DEVICE — LINEN TOWEL PACK X5 5464

## (undated) DEVICE — SU ETHILON 3-0 PS-1 18" 1663G

## (undated) DEVICE — SOL NACL 0.9% IRRIG 500ML BOTTLE 2F7123

## (undated) DEVICE — GLOVE PROTEXIS MICRO 5.5  2D73PM55

## (undated) DEVICE — CLIP HORIZON MED BLUE 002200

## (undated) DEVICE — CLIP HORIZON SM RED WIDE SLOT 001201

## (undated) DEVICE — SU PDS II 0 CT-1 27" Z340H

## (undated) RX ORDER — ISOSULFAN BLUE 50 MG/5ML
INJECTION, SOLUTION SUBCUTANEOUS
Status: DISPENSED
Start: 2019-07-18

## (undated) RX ORDER — FENTANYL CITRATE 50 UG/ML
INJECTION, SOLUTION INTRAMUSCULAR; INTRAVENOUS
Status: DISPENSED
Start: 2019-07-18

## (undated) RX ORDER — LIDOCAINE HYDROCHLORIDE AND EPINEPHRINE 10; 10 MG/ML; UG/ML
INJECTION, SOLUTION INFILTRATION; PERINEURAL
Status: DISPENSED
Start: 2019-07-18

## (undated) RX ORDER — PROPOFOL 10 MG/ML
INJECTION, EMULSION INTRAVENOUS
Status: DISPENSED
Start: 2019-07-18

## (undated) RX ORDER — BUPIVACAINE HYDROCHLORIDE 2.5 MG/ML
INJECTION, SOLUTION EPIDURAL; INFILTRATION; INTRACAUDAL
Status: DISPENSED
Start: 2019-07-18

## (undated) RX ORDER — DEXAMETHASONE SODIUM PHOSPHATE 4 MG/ML
INJECTION, SOLUTION INTRA-ARTICULAR; INTRALESIONAL; INTRAMUSCULAR; INTRAVENOUS; SOFT TISSUE
Status: DISPENSED
Start: 2019-07-18

## (undated) RX ORDER — CEFAZOLIN SODIUM 2 G/50ML
SOLUTION INTRAVENOUS
Status: DISPENSED
Start: 2019-07-18

## (undated) RX ORDER — ONDANSETRON 2 MG/ML
INJECTION INTRAMUSCULAR; INTRAVENOUS
Status: DISPENSED
Start: 2019-07-18

## (undated) RX ORDER — CEFAZOLIN SODIUM 1 G/3ML
INJECTION, POWDER, FOR SOLUTION INTRAMUSCULAR; INTRAVENOUS
Status: DISPENSED
Start: 2019-07-18

## (undated) RX ORDER — HYDROMORPHONE HYDROCHLORIDE 1 MG/ML
INJECTION, SOLUTION INTRAMUSCULAR; INTRAVENOUS; SUBCUTANEOUS
Status: DISPENSED
Start: 2019-07-18

## (undated) RX ORDER — GABAPENTIN 300 MG/1
CAPSULE ORAL
Status: DISPENSED
Start: 2019-07-18

## (undated) RX ORDER — ACETAMINOPHEN 325 MG/1
TABLET ORAL
Status: DISPENSED
Start: 2019-07-18

## (undated) RX ORDER — FENTANYL CITRATE 50 UG/ML
INJECTION, SOLUTION INTRAMUSCULAR; INTRAVENOUS
Status: DISPENSED
Start: 2021-06-28

## (undated) RX ORDER — SIMETHICONE 40MG/0.6ML
SUSPENSION, DROPS(FINAL DOSAGE FORM)(ML) ORAL
Status: DISPENSED
Start: 2021-06-28